# Patient Record
Sex: FEMALE | Race: WHITE | Employment: FULL TIME | URBAN - METROPOLITAN AREA
[De-identification: names, ages, dates, MRNs, and addresses within clinical notes are randomized per-mention and may not be internally consistent; named-entity substitution may affect disease eponyms.]

---

## 2017-03-21 ENCOUNTER — GENERIC CONVERSION - ENCOUNTER (OUTPATIENT)
Dept: OTHER | Facility: OTHER | Age: 47
End: 2017-03-21

## 2017-03-22 ENCOUNTER — GENERIC CONVERSION - ENCOUNTER (OUTPATIENT)
Dept: OTHER | Facility: OTHER | Age: 47
End: 2017-03-22

## 2017-03-23 ENCOUNTER — GENERIC CONVERSION - ENCOUNTER (OUTPATIENT)
Dept: OTHER | Facility: OTHER | Age: 47
End: 2017-03-23

## 2017-03-24 ENCOUNTER — GENERIC CONVERSION - ENCOUNTER (OUTPATIENT)
Dept: OTHER | Facility: OTHER | Age: 47
End: 2017-03-24

## 2017-05-04 ENCOUNTER — ALLSCRIPTS OFFICE VISIT (OUTPATIENT)
Dept: OTHER | Facility: OTHER | Age: 47
End: 2017-05-04

## 2017-05-04 DIAGNOSIS — N94.9 UNSPECIFIED CONDITION ASSOCIATED WITH FEMALE GENITAL ORGANS AND MENSTRUAL CYCLE: ICD-10-CM

## 2017-05-04 DIAGNOSIS — D25.9 LEIOMYOMA OF UTERUS: ICD-10-CM

## 2017-05-04 DIAGNOSIS — Z12.31 ENCOUNTER FOR SCREENING MAMMOGRAM FOR MALIGNANT NEOPLASM OF BREAST: ICD-10-CM

## 2017-05-04 DIAGNOSIS — Z01.419 ENCOUNTER FOR GYNECOLOGICAL EXAMINATION WITHOUT ABNORMAL FINDING: ICD-10-CM

## 2017-05-13 ENCOUNTER — HOSPITAL ENCOUNTER (OUTPATIENT)
Dept: ULTRASOUND IMAGING | Facility: HOSPITAL | Age: 47
Discharge: HOME | End: 2017-05-13
Attending: OBSTETRICS & GYNECOLOGY
Payer: COMMERCIAL

## 2017-05-13 DIAGNOSIS — N94.9 UNSPECIFIED CONDITION ASSOCIATED WITH FEMALE GENITAL ORGANS AND MENSTRUAL CYCLE: ICD-10-CM

## 2017-05-13 PROCEDURE — 76856 US EXAM PELVIC COMPLETE: CPT

## 2017-05-13 PROCEDURE — 76830 TRANSVAGINAL US NON-OB: CPT

## 2017-05-16 ENCOUNTER — GENERIC CONVERSION - ENCOUNTER (OUTPATIENT)
Dept: OTHER | Facility: OTHER | Age: 47
End: 2017-05-16

## 2017-05-22 ENCOUNTER — ALLSCRIPTS OFFICE VISIT (OUTPATIENT)
Dept: OTHER | Facility: OTHER | Age: 47
End: 2017-05-22

## 2017-06-03 ENCOUNTER — HOSPITAL ENCOUNTER (OUTPATIENT)
Dept: MAMMOGRAPHY | Facility: CLINIC | Age: 47
Discharge: HOME | End: 2017-06-03
Payer: COMMERCIAL

## 2017-06-03 DIAGNOSIS — Z12.31 ENCOUNTER FOR SCREENING MAMMOGRAM FOR MALIGNANT NEOPLASM OF BREAST: ICD-10-CM

## 2017-06-03 DIAGNOSIS — Z01.419 ENCOUNTER FOR GYNECOLOGICAL EXAMINATION WITHOUT ABNORMAL FINDING: ICD-10-CM

## 2017-06-03 PROCEDURE — G0202 SCR MAMMO BI INCL CAD: HCPCS

## 2017-10-07 ENCOUNTER — HOSPITAL ENCOUNTER (OUTPATIENT)
Dept: ULTRASOUND IMAGING | Facility: HOSPITAL | Age: 47
Discharge: HOME | End: 2017-10-07
Attending: OBSTETRICS & GYNECOLOGY
Payer: COMMERCIAL

## 2017-10-07 DIAGNOSIS — D25.9 LEIOMYOMA OF UTERUS: ICD-10-CM

## 2017-10-07 PROCEDURE — 76830 TRANSVAGINAL US NON-OB: CPT

## 2017-10-07 PROCEDURE — 76856 US EXAM PELVIC COMPLETE: CPT

## 2017-10-11 ENCOUNTER — GENERIC CONVERSION - ENCOUNTER (OUTPATIENT)
Dept: OTHER | Facility: OTHER | Age: 47
End: 2017-10-11

## 2017-11-11 ENCOUNTER — GENERIC CONVERSION - ENCOUNTER (OUTPATIENT)
Dept: OTHER | Facility: OTHER | Age: 47
End: 2017-11-11

## 2017-11-12 LAB
A/G RATIO (HISTORICAL): 1.6 (ref 1.2–2.2)
ALBUMIN SERPL BCP-MCNC: 4.5 G/DL (ref 3.5–5.5)
ALP SERPL-CCNC: 55 IU/L (ref 39–117)
ALT SERPL W P-5'-P-CCNC: 20 IU/L (ref 0–32)
AST SERPL W P-5'-P-CCNC: 16 IU/L (ref 0–40)
BILIRUB SERPL-MCNC: 0.3 MG/DL (ref 0–1.2)
BUN SERPL-MCNC: 11 MG/DL (ref 6–24)
BUN/CREA RATIO (HISTORICAL): 16 (ref 9–23)
CALCIUM SERPL-MCNC: 9.6 MG/DL (ref 8.7–10.2)
CHLORIDE SERPL-SCNC: 99 MMOL/L (ref 96–106)
CHOLEST SERPL-MCNC: 156 MG/DL (ref 100–199)
CHOLEST/HDLC SERPL: 3.2 RATIO UNITS (ref 0–4.4)
CO2 SERPL-SCNC: 23 MMOL/L (ref 18–29)
CREAT SERPL-MCNC: 0.67 MG/DL (ref 0.57–1)
DEPRECATED RDW RBC AUTO: 12.4 % (ref 12.3–15.4)
EGFR AFRICAN AMERICAN (HISTORICAL): 121 ML/MIN/1.73
EGFR-AMERICAN CALC (HISTORICAL): 105 ML/MIN/1.73
GLUCOSE SERPL-MCNC: 89 MG/DL (ref 65–99)
HCT VFR BLD AUTO: 40.8 % (ref 34–46.6)
HDLC SERPL-MCNC: 49 MG/DL
HGB BLD-MCNC: 14.1 G/DL (ref 11.1–15.9)
LDLC SERPL CALC-MCNC: 69 MG/DL (ref 0–99)
MCH RBC QN AUTO: 29.4 PG (ref 26.6–33)
MCHC RBC AUTO-ENTMCNC: 34.6 G/DL (ref 31.5–35.7)
MCV RBC AUTO: 85 FL (ref 79–97)
PLATELET # BLD AUTO: 403 X10E3/UL (ref 150–379)
POTASSIUM SERPL-SCNC: 4.1 MMOL/L (ref 3.5–5.2)
RBC (HISTORICAL): 4.8 X10E6/UL (ref 3.77–5.28)
SODIUM SERPL-SCNC: 138 MMOL/L (ref 134–144)
T4 FREE SERPL-MCNC: 1.06 NG/DL (ref 0.82–1.77)
TOT. GLOBULIN, SERUM (HISTORICAL): 2.8 G/DL (ref 1.5–4.5)
TOTAL PROTEIN (HISTORICAL): 7.3 G/DL (ref 6–8.5)
TRIGL SERPL-MCNC: 190 MG/DL (ref 0–149)
TSH SERPL DL<=0.05 MIU/L-ACNC: 4.11 UIU/ML (ref 0.45–4.5)
VLDLC SERPL CALC-MCNC: 38 MG/DL (ref 5–40)
WBC # BLD AUTO: 7.7 X10E3/UL (ref 3.4–10.8)

## 2017-11-20 ENCOUNTER — ALLSCRIPTS OFFICE VISIT (OUTPATIENT)
Dept: OTHER | Facility: OTHER | Age: 47
End: 2017-11-20

## 2017-11-21 NOTE — PROGRESS NOTES
Assessment    1  Elevated blood pressure reading in office without diagnosis of hypertension (796 2) (R03 0)   2  Polycystic ovarian syndrome (256 4) (E28 2)   3  Nontoxic single thyroid nodule (241 0) (E04 1)   4  Anxiety (300 00) (F41 9)   5  Obesity (278 00) (E66 9)    Discussion/Summary    BP excellent controlup and down- she will be more consistent with weight loss effortsreviewed in detail and copy given to patientexcellent profile with lower total cholesterolstaying stable, US to be done in 2018fibroid and L ovarian cyst following with Dr Nichole Constantino  The patient was counseled regarding diagnostic results,-- instructions for management,-- risk factor reductions,-- impressions  Possible side effects of new medications were reviewed with the patient/guardian today  The treatment plan was reviewed with the patient/guardian  The patient/guardian understands and agrees with the treatment plan     Self Referrals: No      Chief Complaint  Patient is here today for follow up of chronic conditions described in HPI  History of Present Illness  6 month follow up a recent sinus infection, resolving after 10 days  recent transvaginal US for fibroid and L ovarian cyst, rechecking in several months  Review of Systems   Constitutional: no fever,-- not feeling poorly,-- no recent weight gain,-- not feeling tired-- and-- no recent weight loss  Cardiovascular: no chest pain-- and-- no palpitations  Respiratory: no cough-- and-- no shortness of breath during exertion  Psychiatric: no anxiety-- and-- no depression  Hematologic/Lymphatic: no tendency for easy bruising  Active Problems  1  Adnexal mass (625 8) (N94 9)   2  Anxiety (300 00) (F41 9)   3  Constipation, unspecified constipation type (564 00) (K59 00)   4  Cyst of ovary, unspecified laterality (620 2) (N83 209)   5  Fibroid uterus (218 9) (D25 9)   6  Nontoxic single thyroid nodule (241 0) (E04 1)   7  Obesity (278 00) (E66 9)   8   Denied: History of Patient Education - Self-Examination Of Breasts   9  Polycystic ovarian syndrome (256 4) (E28 2)    Past Medical History  1  History of Anxiety disorder (300 00) (F41 9)   2  History of Back Strain (847 9)   3  History of  0 (V49 89)   4  History of Hirsutism (704 1) (L68 0)   5  History of polycystic ovarian syndrome (V13 29) (Z87 42)   6  Denied: History of substance abuse    The active problems and past medical history were reviewed and updated today  Surgical History  1  History of Denial Of Any Significant Medical History   2  History of Oral Surgery Tooth Extraction   3  Denied: History of Patient Education - Self-Examination Of Breasts    The surgical history was reviewed and updated today  Family History  Mother    1  Family history of Arthritis (V17 7)   2  Family history of depression (V17 0) (Z81 8)   3  Denied: Family history of substance abuse   4  Family history of Hypertension (V17 49)   5  Family history of Thyroid Disorder (V18 19)  Father    6  Family history of Crohn's Disease   7  Family history of Diabetes Mellitus (V18 0)   8  Denied: Family history of substance abuse   9  Family history of Heart Disease (V17 49)   10  Family history of Hypertension (V17 49)  Family History    11  Family history of Cancer    The family history was reviewed and updated today  Social History     · Denied: History of Alcohol Use (History)   · Birth Control Method - Oral Contraceptives   · Caffeine Use   · Denied: History of    · Marital History - Single   · Never A Smoker   · Non-smoker (V49 89) (Z78 9)   · Church Affiliation [employer]   · Uses Safety Equipment - Seatbelts  The social history was reviewed and updated today  The social history was reviewed and is unchanged  Current Meds   1  ALPRAZolam 0 5 MG Oral Tablet; 1/2 - 1 at bedtime; Therapy: 89IAA9458 to (Last WH:02BTI1087)  Requested for: 26CWJ6183 Ordered   2  Calcium + D3 600-200 MG-UNIT Oral Tablet Recorded   3  Multi-Vitamin TABS; Therapy: (Recorded:16Jan2014) to Recorded   4  Tri-Lo-Sprintec 0 18/0 215/0 25 MG-25 MCG Oral Tablet; TAKE 1 TABLET DAILY AS DIRECTED; Therapy: 78QOR6116 to (Last FZ:93MIL0302)  Requested for: 01EDA1404 Ordered   5  Vitamin B50 Complex TBCR; Therapy: (Recorded:16Jan2014) to Recorded    The medication list was reviewed and updated today  Allergies  1  Amoxicillin CAPS   2  Naproxen SUSP   3  Biaxin TABS   4  Influenza (Split PF)    Vitals  Vital Signs    Recorded: 20Nov2017 06:07PM   Temperature 99 F, Tympanic   Heart Rate 84   Systolic 286, Sitting   Diastolic 74, Sitting   BP CUFF SIZE Large   Height 5 ft 5 in   Weight 182 lb 6 4 oz   BMI Calculated 30 35   BSA Calculated 1 9       Physical Exam   Constitutional  General appearance: No acute distress, well appearing and well nourished  Cardiovascular  Auscultation of heart: Normal rate and rhythm, normal S1 and S2, without murmurs  Examination of extremities for edema and/or varicosities: Normal    Carotid pulses: Normal    Lymphatic  Palpation of lymph nodes in neck: No lymphadenopathy  Musculoskeletal  Gait and station: Normal    Psychiatric  Orientation to person, place, and time: Normal    Mood and affect: Normal          Results/Data  PHQ-2 Adult Depression Screening 20Nov2017 06:07PM User, Ahs     Test Name Result Flag Reference   PHQ-2 Adult Depression Score 1       Over the last two weeks, how often have you been bothered by any of the following problems?  Little interest or pleasure in doing things: Not at all - 0 Feeling down, depressed, or hopeless: Several days - 1   PHQ-2 Adult Depression Screening Negative           Signatures   Electronically signed by : Cesar Delacruz MD; Nov 20 2017  6:42PM EST                       (Author)

## 2018-01-09 NOTE — RESULT NOTES
Message   has upcoming f/u     Verified Results  [facility] TISSUE [id number] 09:47AM [place] [facility]     Test Name Result Flag Reference   US HEAD NECK SOFT TISSUE (Report)     THYROID ULTRASOUND     INDICATION: History of left thyroid nodule  Follow-up exam      COMPARISON: 10/26/2015     TECHNIQUE:  Ultrasound of the thyroid was performed with a high frequency linear transducer in transverse and sagittal planes including volumetric imaging sweeps as well as traditional still imaging technique  FINDINGS:   Normal homogeneous smooth echotexture  Right gland: 4 8 x 1 3 x 1 9 cm  No dominant nodules  Left gland: 5 0 x 1 4 x 1 6 cm  Posterior mid lobe hypoechoic ovoid nodule reidentified measuring 1 6 x 0 7 x 0 8 cm (previous measurement 1 5 x 0 7 x 0 8 cm)  Isthmus: 0 2 cm in AP dimension  No dominant nodules  IMPRESSION:      Stable ultrasound exam with a single nodule in the posterior mid lobe on the left as described  Workstation performed: [id number]     Signed by:    Orion Zimmer MD   12/5/16

## 2018-01-10 NOTE — MISCELLANEOUS
Message   Date: 21 Mar 2017 4:26 PM EST, Recorded By: Carla Liz For: Arlen Bruce   Caller: Nathaniel Fraser, Self   Phone: [phone] (Home), [phone] x,,,,, (Work)   Reason: Renew Medication   Patient called for BCP refill  Appointment 5/4/17  Escript sent  Active Problems    1  Acute otitis media (382 9) (H66 90)   2  Acute sinusitis (461 9) (J01 90)   3  Anxiety (300 00) (F41 9)   4  Birth control counseling (V25 09) (Z30 09)   5  Cerumen impaction (380 4) (H61 20)   6  Constipation, unspecified constipation type (564 00) (K59 00)   7  Encounter for screening for malignant neoplasm of cervix (V76 2) (Z12 4)   8  Encounter for screening mammogram for malignant neoplasm of breast (V76 12)   (Z12 31)   9  Eustachian tube dysfunction (381 81) (H69 80)   10  Flu vaccine need (V04 81) (Z23)   11  History of self breast exam   12  Hypertension (401 9) (I10)   13  Irritable Bowel Syndrome With Diarrhea (564 1)   14  Nontoxic single thyroid nodule (241 0) (E04 1)   15  Obesity (278 00) (E66 9)   16  Denied: History of Patient Education - Self-Examination Of Breasts   17  Polycystic ovarian syndrome (256 4) (E28 2)   18  Scalp lump (784 2) (R22 0)   19  Strep pharyngitis (034 0) (J02 0)   20  Tightness of neck (723 9) (R29 898)   21  Upper respiratory infection (465 9) (J06 9)   22  Visit for routine gyn exam (V72 31) (Z01 419)    Current Meds   1  ALPRAZolam 0 5 MG Oral Tablet; 1/2 - 1 at bedtime; Therapy: 75YBW3464 to (Last XJ:27IRY6679)  Requested for: 58ARW7879 Ordered   2  Calcium + D3 600-200 MG-UNIT Oral Tablet Recorded   3  Multi-Vitamin TABS; Therapy: (Recorded:16Jan2014) to Recorded   4  Tri-Lo-Sprintec 0 18/0 215/0 25 MG-25 MCG Oral Tablet; TAKE 1 TABLET DAILY AS   DIRECTED; Therapy: 20FYN6405 to (Evaluate:17Mar2016)  Requested for: 40KNO9782; Last   Rx:18Feb2016 Ordered   5  Vitamin B50 Complex TBCR; Therapy: (Recorded:16Jan2014) to Recorded    Allergies    1  Amoxicillin CAPS   2  Naproxen SUSP   3  Biaxin TABS   4   Influenza (Split PF)    Plan  Polycystic ovarian syndrome    · Tri-Lo-Sprintec 0 18/0 215/0 25 MG-25 MCG Oral Tablet; TAKE 1 TABLET DAILY  AS DIRECTED    Signatures   Electronically signed by : Wolf Gonzalez, ; Mar 21 2017  4:26PM EST                       (Author)

## 2018-01-10 NOTE — MISCELLANEOUS
Message   Recorded as Task   Date: 03/21/2017 05:33 PM, Created By: Kristie Greenwood   Task Name: Follow Up   Assigned To: Maisha Range   Regarding Patient: Lenore Jernigan, Status: Active   Comment:    Kristie Greenwood - 21 Mar 2017 5:33 PM     TASK CREATED  pt has had some elevated BPs, should come in before we renew her ocs  Diana Devlin - 22 Mar 2017 11:34 AM     TASK IN PROGRESS   Diana Devlin - 22 Mar 2017 2:35 PM     TASK EDITED  pt will come in for a BP check before we renew pills   Dinaa Devlin - 22 Mar 2017 2:35 PM     TASK COMPLETED   Diana Devlin - 23 Mar 2017 7:30 AM     TASK REACTIVATED   Diana Devlin - 23 Mar 2017 7:30 AM     TASK REASSIGNED: Previously Assigned To SCOTT NAVARRO KIARRA [facility],Team  pt was at [facility] this Am and her BP was 128/80  Janie Mort can we refill her pills? Kristie Greenwood - 24 Mar 2017 6:38 AM     TASK REPLIED TO: Previously Assigned To Kristie Greenwood  yes        Active Problems    1  Acute otitis media (382 9) (H66 90)   2  Acute sinusitis (461 9) (J01 90)   3  Anxiety (300 00) (F41 9)   4  Birth control counseling (V25 09) (Z30 09)   5  Cerumen impaction (380 4) (H61 20)   6  Constipation, unspecified constipation type (564 00) (K59 00)   7  Encounter for screening for malignant neoplasm of cervix (V76 2) (Z12 4)   8  Encounter for screening mammogram for malignant neoplasm of breast (V76 12)   (Z12 31)   9  Eustachian tube dysfunction (381 81) (H69 80)   10  Flu vaccine need (V04 81) (Z23)   11  History of self breast exam   12  Hypertension (401 9) (I10)   13  Irritable Bowel Syndrome With Diarrhea (564 1)   14  Nontoxic single thyroid nodule (241 0) (E04 1)   15  Obesity (278 00) (E66 9)   16  Denied: History of Patient Education - Self-Examination Of Breasts   17  Polycystic ovarian syndrome (256 4) (E28 2)   18  Scalp lump (784 2) (R22 0)   19  Strep pharyngitis (034 0) (J02 0)   20  Tightness of neck (723 9) (R29 898)   21  Upper respiratory infection (465 9) (J06 9)   22   Visit for routine gyn exam (V72 31) (Z01 419)    Current Meds   1  ALPRAZolam 0 5 MG Oral Tablet; 1/2 - 1 at bedtime; Therapy: 30VSF9815 to (Last XZ:82JPY7730)  Requested for: 35ARZ3207 Ordered   2  Calcium + D3 600-200 MG-UNIT Oral Tablet Recorded   3  Multi-Vitamin TABS; Therapy: (Recorded:16Jan2014) to Recorded   4  Tri-Lo-Sprintec 0 18/0 215/0 25 MG-25 MCG Oral Tablet; TAKE 1 TABLET DAILY; Therapy: 79XLV1290 to (Evaluate:54Cwn5141)  Requested for: 16NIM4649; Last   Rx:24Mar2017 Ordered   5  Vitamin B50 Complex TBCR; Therapy: (Recorded:16Jan2014) to Recorded    Allergies    1  Amoxicillin CAPS   2  Naproxen SUSP   3  Biaxin TABS   4   Influenza (Split PF)    Signatures   Electronically signed by : Jenifer Askew, ; Mar 24 2017  7:32AM EST                       (Author)

## 2018-01-11 DIAGNOSIS — N83.209 CYST OF OVARY: ICD-10-CM

## 2018-01-11 DIAGNOSIS — D25.9 LEIOMYOMA OF UTERUS: ICD-10-CM

## 2018-01-12 VITALS
WEIGHT: 181.13 LBS | HEIGHT: 65 IN | SYSTOLIC BLOOD PRESSURE: 112 MMHG | DIASTOLIC BLOOD PRESSURE: 78 MMHG | BODY MASS INDEX: 30.18 KG/M2

## 2018-01-12 NOTE — MISCELLANEOUS
Message   Recorded as Task   Date: 05/15/2017 04:05 PM, Created By: Latoya Kothari   Task Name: Go to Result   Assigned To: Mumtaz Castillo   Regarding Patient: Molly Riggins, Status: In Progress   Comment:    Latoya Kothari - 15 May 2017 4:05 PM     TASK CREATED  US shows multiple small to medium sized fibroids  normal ovaries, since this is a baseline study, would recommend repeat US in 4 months to be sure they are stable  please send her the fibroid pamphlet  Diana Devlin - 16 May 2017 1:48 PM     TASK IN PROGRESS   Patient informed  Order and pamphlet mailed  Active Problems    1  Acute otitis media (382 9) (H66 90)   2  Acute sinusitis (461 9) (J01 90)   3  Adnexal mass (625 8) (N94 9)   4  Anxiety (300 00) (F41 9)   5  Birth control counseling (V25 09) (Z30 09)   6  Cerumen impaction (380 4) (H61 20)   7  Constipation, unspecified constipation type (564 00) (K59 00)   8  Encounter for screening for malignant neoplasm of cervix (V76 2) (Z12 4)   9  Encounter for screening mammogram for malignant neoplasm of breast (V76 12)   (Z12 31)   10  Eustachian tube dysfunction (381 81) (H69 80)   11  Fibroid uterus (218 9) (D25 9)   12  Flu vaccine need (V04 81) (Z23)   13  History of self breast exam   14  Hypertension (401 9) (I10)   15  Irritable Bowel Syndrome With Diarrhea (564 1)   16  Nontoxic single thyroid nodule (241 0) (E04 1)   17  Obesity (278 00) (E66 9)   18  Denied: History of Patient Education - Self-Examination Of Breasts   19  Polycystic ovarian syndrome (256 4) (E28 2)   20  Scalp lump (784 2) (R22 0)   21  Strep pharyngitis (034 0) (J02 0)   22  Tightness of neck (723 9) (R29 898)   23  Upper respiratory infection (465 9) (J06 9)   24  Visit for routine gyn exam (V72 31) (Z01 419)    Current Meds   1  ALPRAZolam 0 5 MG Oral Tablet; 1/2 - 1 at bedtime; Therapy: 80OJW8496 to (Last OB:97FFA9954)  Requested for: 55IHP4013 Ordered   2   Calcium + D3 600-200 MG-UNIT Oral Tablet Recorded   3  Multi-Vitamin TABS; Therapy: (Recorded:16Jan2014) to Recorded   4  Tri-Lo-Sprintec 0 18/0 215/0 25 MG-25 MCG Oral Tablet; TAKE 1 TABLET DAILY AS   DIRECTED; Therapy: 12JBL5964 to (Last MR:91NAR5904)  Requested for: 52XXR6027 Ordered   5  Vitamin B50 Complex TBCR; Therapy: (Recorded:16Jan2014) to Recorded    Allergies    1  Amoxicillin CAPS   2  Naproxen SUSP   3  Biaxin TABS   4  Influenza (Split PF)    Plan  Fibroid uterus    · US PELVIS COMPLETE NON OB; Status:Hold For - Scheduling,Retrospective  Authorization;  Requested for:92Sxp0755;     Signatures   Electronically signed by : Giancarlo Uribe, ; May 16 2017  4:32PM EST                       (Author)

## 2018-01-13 VITALS — DIASTOLIC BLOOD PRESSURE: 80 MMHG | SYSTOLIC BLOOD PRESSURE: 128 MMHG

## 2018-01-13 NOTE — MISCELLANEOUS
Message   Recorded as Task   Date: 03/21/2017 05:33 PM, Created By: Sonya Cordoba   Task Name: Follow Up   Assigned To: Maral Wilson   Regarding Patient: Lyudmila Brasher, Status: In Progress   Comment:    Sonya Cordoba - 21 Mar 2017 5:33 PM     TASK CREATED  pt has had some elevated BPs, should come in before we renew her ocs  Diana Devlin - 22 Mar 2017 11:34 AM     TASK IN PROGRESS   Diana Devlin - 22 Mar 2017 2:35 PM     TASK EDITED  pt will come in for a BP check before we renew pills        Active Problems    1  Acute otitis media (382 9) (H66 90)   2  Acute sinusitis (461 9) (J01 90)   3  Anxiety (300 00) (F41 9)   4  Birth control counseling (V25 09) (Z30 09)   5  Cerumen impaction (380 4) (H61 20)   6  Constipation, unspecified constipation type (564 00) (K59 00)   7  Encounter for screening for malignant neoplasm of cervix (V76 2) (Z12 4)   8  Encounter for screening mammogram for malignant neoplasm of breast (V76 12)   (Z12 31)   9  Eustachian tube dysfunction (381 81) (H69 80)   10  Flu vaccine need (V04 81) (Z23)   11  History of self breast exam   12  Hypertension (401 9) (I10)   13  Irritable Bowel Syndrome With Diarrhea (564 1)   14  Nontoxic single thyroid nodule (241 0) (E04 1)   15  Obesity (278 00) (E66 9)   16  Denied: History of Patient Education - Self-Examination Of Breasts   17  Polycystic ovarian syndrome (256 4) (E28 2)   18  Scalp lump (784 2) (R22 0)   19  Strep pharyngitis (034 0) (J02 0)   20  Tightness of neck (723 9) (R29 898)   21  Upper respiratory infection (465 9) (J06 9)   22  Visit for routine gyn exam (V72 31) (Z01 419)    Current Meds   1  ALPRAZolam 0 5 MG Oral Tablet; 1/2 - 1 at bedtime; Therapy: 31TVF2749 to (Last XO:67LTB8607)  Requested for: 70RXB0676 Ordered   2  Calcium + D3 600-200 MG-UNIT Oral Tablet Recorded   3  Multi-Vitamin TABS; Therapy: (Recorded:16Jan2014) to Recorded   4   Tri-Lo-Sprintec 0 18/0 215/0 25 MG-25 MCG Oral Tablet; TAKE 1 TABLET DAILY AS   DIRECTED; Therapy: 86BXW8216 to (Evaluate:17Mar2016)  Requested for: 21Mar2017; Last   Rx:18Feb2016; Status: ACTIVE - Renewal Voided Ordered   5  Vitamin B50 Complex TBCR; Therapy: (Recorded:16Jan2014) to Recorded    Allergies    1  Amoxicillin CAPS   2  Naproxen SUSP   3  Biaxin TABS   4   Influenza (Split PF)    Signatures   Electronically signed by : Delvin Camargo, ; Mar 22 2017  2:35PM EST                       (Author)

## 2018-01-14 VITALS
SYSTOLIC BLOOD PRESSURE: 134 MMHG | HEIGHT: 65 IN | TEMPERATURE: 99.3 F | DIASTOLIC BLOOD PRESSURE: 86 MMHG | WEIGHT: 180.4 LBS | HEART RATE: 76 BPM | BODY MASS INDEX: 30.06 KG/M2

## 2018-01-14 VITALS
TEMPERATURE: 99 F | HEART RATE: 84 BPM | HEIGHT: 65 IN | DIASTOLIC BLOOD PRESSURE: 74 MMHG | BODY MASS INDEX: 30.39 KG/M2 | WEIGHT: 182.4 LBS | SYSTOLIC BLOOD PRESSURE: 128 MMHG

## 2018-01-14 NOTE — MISCELLANEOUS
Message   Recorded as Task   Date: 10/10/2017 10:21 PM, Created By: Melisa Sawyer   Task Name: Go to Result   Assigned To: Efren Escoto   Regarding Patient: Evelyn Chavarria, Status: In Progress   Comment:    Melisa Sawyer - 10 Oct 2017 10:21 PM     TASK CREATED  Us shows two stable fibroids and one larger than last US, she also has a left ovarian cyst, repeat US in 3 months   Diana Devlin - 11 Oct 2017 10:08 AM     TASK IN PROGRESS   Diana Devlin - 11 Oct 2017 1:01 PM     TASK EDITED  U/S script mailed to pt           Active Problems    1  Adnexal mass (625 8) (N94 9)   2  Anxiety (300 00) (F41 9)   3  Constipation, unspecified constipation type (564 00) (K59 00)   4  Cyst of ovary, unspecified laterality (620 2) (N83 209)   5  Fibroid uterus (218 9) (D25 9)   6  Hypertension (401 9) (I10)   7  Nontoxic single thyroid nodule (241 0) (E04 1)   8  Obesity (278 00) (E66 9)   9  Denied: History of Patient Education - Self-Examination Of Breasts   10  Polycystic ovarian syndrome (256 4) (E28 2)    Current Meds   1  ALPRAZolam 0 5 MG Oral Tablet; 1/2 - 1 at bedtime; Therapy: 18KTV5200 to (Last RC:50AFO5756)  Requested for: 09BZI0470 Ordered   2  Calcium + D3 600-200 MG-UNIT Oral Tablet Recorded   3  Multi-Vitamin TABS; Therapy: (Recorded:16Jan2014) to Recorded   4  Tri-Lo-Sprintec 0 18/0 215/0 25 MG-25 MCG Oral Tablet; TAKE 1 TABLET DAILY AS   DIRECTED; Therapy: 18MHK4069 to (Last EI:61YGB1852)  Requested for: 49LIE1743 Ordered   5  Vitamin B50 Complex TBCR; Therapy: (Recorded:16Jan2014) to Recorded    Allergies    1  Amoxicillin CAPS   2  Naproxen SUSP   3  Biaxin TABS   4  Influenza (Split PF)    Plan  Cyst of ovary, unspecified laterality, Fibroid uterus    · * US PELVIS COMPLETE (TRANSABDOMINAL AND TRANSVAGINAL); Status:Hold For -  [place];  Requested FUY:76ZBN7174;     Signatures   Electronically signed by : Tracy Granado, ; Oct 11 2017  1:02PM EST (Author)

## 2018-01-17 NOTE — MISCELLANEOUS
Message   Recorded as Task   Date: 02/22/2016 01:41 PM, Created By: [id number] [email or web address]   Task Name: Follow Up   Assigned To: Maisha Reaves   Regarding Patient: Lenore Jernigan, Status: In Progress   Comment:    Diana Devlin - 22 Feb 2016 1:41 PM     TASK CREATED  pt picked up her generic ortho lo and saw on the instructions that you shouldn't take it if you are over 35    Isn't that on all of the pill instructions? Anmolselvinglenn  - 22 Feb 2016 3:53 PM     TASK REPLIED TO: Previously Assigned To Kristie Greenwood  the main concern is if she is a smoker over 28  The risks do go up with age but she is a good candidate because she is otherwise healthy  This goes for any combination pill  Diana Devlin - 22 Feb 2016 3:55 PM     TASK IN PROGRESS   Diana Devlin - 23 Feb 2016 9:33 AM     TASK REASSIGNED: Previously Assigned To Diana Devlin        Active Problems    1  Acute otitis media (382 9) (H66 90)   2  Acute sinusitis (461 9) (J01 90)   3  Anxiety (300 00) (F41 9)   4  Birth control counseling (V25 09) (Z30 9)   5  Cerumen impaction (380 4) (H61 20)   6  Encounter for screening for malignant neoplasm of cervix (V76 2) (Z12 4)   7  Encounter for screening mammogram for malignant neoplasm of breast (V76 12)   (Z12 31)   8  Eustachian tube dysfunction (381 81) (H69 80)   9  Flu vaccine need (V04 81) (Z23)   10  History of self breast exam   11  Hypertension (401 9) (I10)   12  Irritable Bowel Syndrome With Diarrhea (564 1)   13  Nontoxic single thyroid nodule (241 0) (E04 1)   14  Denied: History of Patient Education - Self-Examination Of Breasts   15  Polycystic ovarian syndrome (256 4) (E28 2)   16  Scalp lump (784 2) (R22 0)   17  Strep pharyngitis (034 0) (J02 0)   18  Tightness of neck (723 9) (R29 898)   19  Upper respiratory infection (465 9) (J06 9)   20  Visit for routine gyn exam (V72 31) (Z01 419)    Current Meds   1  ALPRAZolam 0 5 MG Oral Tablet; 1/2 - 1 at bedtime;    Therapy: 37VRM9882 to (Last KV:97RNF1078)  Requested for: 04JOQ2668 Ordered   2  Calcium + D3 600-200 MG-UNIT Oral Tablet Recorded   3  Microgestin FE 1/20 1-20 MG-MCG Oral Tablet; TAKE 1 TABLET DAILY AS DIRECTED; Therapy: 39CVQ9437 to (Evaluate:19Feb2015); Last Rx:33Nsy6407 Ordered   4  Multi-Vitamin TABS; Therapy: (Recorded:16Jan2014) to Recorded   5  Tri-Lo-Sprintec 0 18/0 215/0 25 MG-25 MCG Oral Tablet; TAKE 1 TABLET DAILY AS   DIRECTED; Therapy: 24XEA1222 to (Evaluate:17Mar2016)  Requested for: 35IKA7993; Last   Rx:18Feb2016 Ordered   6  Vitamin B50 Complex TBCR; Therapy: (Recorded:16Jan2014) to Recorded    Allergies    1  Amoxicillin CAPS   2  Naproxen SUSP   3  Biaxin TABS   4   Influenza (Split PF)    Signatures   Electronically signed by : David Vance, ; Feb 24 2016  2:35PM EST                       (Author)

## 2018-01-27 ENCOUNTER — HOSPITAL ENCOUNTER (OUTPATIENT)
Dept: ULTRASOUND IMAGING | Facility: HOSPITAL | Age: 48
Discharge: HOME | End: 2018-01-27
Attending: OBSTETRICS & GYNECOLOGY
Payer: COMMERCIAL

## 2018-01-27 DIAGNOSIS — D25.9 LEIOMYOMA OF UTERUS: ICD-10-CM

## 2018-01-27 DIAGNOSIS — N83.209 CYST OF OVARY: ICD-10-CM

## 2018-01-27 PROCEDURE — 76856 US EXAM PELVIC COMPLETE: CPT

## 2018-01-27 PROCEDURE — 76830 TRANSVAGINAL US NON-OB: CPT

## 2018-05-15 DIAGNOSIS — Z30.41 ENCOUNTER FOR SURVEILLANCE OF CONTRACEPTIVE PILLS: Primary | ICD-10-CM

## 2018-05-15 RX ORDER — NORGESTIMATE AND ETHINYL ESTRADIOL 7DAYSX3 LO
1 KIT ORAL DAILY
Qty: 28 TABLET | Refills: 1 | Status: SHIPPED | OUTPATIENT
Start: 2018-05-15 | End: 2018-06-21 | Stop reason: SDUPTHER

## 2018-05-20 PROBLEM — N94.89 ADNEXAL MASS: Status: ACTIVE | Noted: 2017-05-04

## 2018-05-20 PROBLEM — D25.9 FIBROID UTERUS: Status: ACTIVE | Noted: 2017-05-16

## 2018-05-20 PROBLEM — R03.0 ELEVATED BLOOD PRESSURE READING IN OFFICE WITHOUT DIAGNOSIS OF HYPERTENSION: Status: ACTIVE | Noted: 2017-11-20

## 2018-05-20 PROBLEM — N83.209 CYST OF OVARY: Status: ACTIVE | Noted: 2017-10-11

## 2018-05-20 RX ORDER — ALPRAZOLAM 0.5 MG/1
TABLET ORAL
COMMUNITY
Start: 2011-09-14 | End: 2018-12-17 | Stop reason: ALTCHOICE

## 2018-05-20 RX ORDER — NORGESTIMATE AND ETHINYL ESTRADIOL 7DAYSX3 LO
1 KIT ORAL DAILY
COMMUNITY
Start: 2016-02-18 | End: 2018-05-21

## 2018-05-20 RX ORDER — MULTIVITAMIN
TABLET ORAL
COMMUNITY

## 2018-05-21 ENCOUNTER — OFFICE VISIT (OUTPATIENT)
Dept: FAMILY MEDICINE CLINIC | Facility: HOSPITAL | Age: 48
End: 2018-05-21
Payer: COMMERCIAL

## 2018-05-21 VITALS
TEMPERATURE: 98.2 F | HEART RATE: 85 BPM | HEIGHT: 65 IN | DIASTOLIC BLOOD PRESSURE: 78 MMHG | BODY MASS INDEX: 30.66 KG/M2 | SYSTOLIC BLOOD PRESSURE: 132 MMHG | WEIGHT: 184 LBS

## 2018-05-21 DIAGNOSIS — M67.449 MUCOUS CYST OF FINGER: ICD-10-CM

## 2018-05-21 DIAGNOSIS — E04.1 NONTOXIC SINGLE THYROID NODULE: ICD-10-CM

## 2018-05-21 DIAGNOSIS — E66.09 CLASS 1 OBESITY DUE TO EXCESS CALORIES WITHOUT SERIOUS COMORBIDITY WITH BODY MASS INDEX (BMI) OF 30.0 TO 30.9 IN ADULT: ICD-10-CM

## 2018-05-21 DIAGNOSIS — D25.9 UTERINE LEIOMYOMA, UNSPECIFIED LOCATION: ICD-10-CM

## 2018-05-21 DIAGNOSIS — R03.0 ELEVATED BLOOD PRESSURE READING IN OFFICE WITHOUT DIAGNOSIS OF HYPERTENSION: Primary | ICD-10-CM

## 2018-05-21 PROCEDURE — 3008F BODY MASS INDEX DOCD: CPT | Performed by: FAMILY MEDICINE

## 2018-05-21 PROCEDURE — 99214 OFFICE O/P EST MOD 30 MIN: CPT | Performed by: FAMILY MEDICINE

## 2018-05-21 NOTE — PROGRESS NOTES
Assessment/Plan:         Diagnoses and all orders for this visit:    Elevated blood pressure reading in office without diagnosis of hypertension  Comments:  Very good control without medication  Orders:  -     CBC and differential; Future  -     Comprehensive metabolic panel; Future  -     Lipid Panel with Direct LDL reflex; Future  -     TSH, 3rd generation; Future  -     CBC and differential  -     Comprehensive metabolic panel  -     Lipid Panel with Direct LDL reflex  -     TSH, 3rd generation    Uterine leiomyoma, unspecified location  Comments: Followup with Gyn    Mucous cyst of finger    Class 1 obesity due to excess calories without serious comorbidity with body mass index (BMI) of 30 0 to 30 9 in adult    Nontoxic single thyroid nodule          Subjective:      Patient ID: Barney Enriquez is a 52 y o  female  Feeling well overall  No recent illness or injury  Her walking was interfered with by the weather  Frustrated with her weight gain and increase in hip size  The following portions of the patient's history were reviewed and updated as appropriate: allergies, current medications, past family history, past medical history, past social history, past surgical history and problem list     Review of Systems   Constitutional: Positive for unexpected weight change  HENT: Negative for ear pain  Respiratory: Negative  Cardiovascular: Negative  Gastrointestinal: Negative for constipation  Genitourinary: Positive for pelvic pain and vaginal bleeding  Musculoskeletal: Negative  Allergic/Immunologic: Positive for environmental allergies  Neurological: Negative for speech difficulty and headaches  Hematological: Negative  Psychiatric/Behavioral: Negative for behavioral problems and dysphoric mood           Objective:      /78   Pulse 85   Temp 98 2 °F (36 8 °C)   Ht 5' 5" (1 651 m)   Wt 83 5 kg (184 lb)   BMI 30 62 kg/m²          Physical Exam   Constitutional: She is oriented to person, place, and time  She appears well-developed and well-nourished  Neck: No thyromegaly present  Pulmonary/Chest: Breath sounds normal    Neurological: She is oriented to person, place, and time  Psychiatric: She has a normal mood and affect  Her behavior is normal  Judgment and thought content normal    Nursing note and vitals reviewed

## 2018-06-21 ENCOUNTER — ANNUAL EXAM (OUTPATIENT)
Dept: OBGYN CLINIC | Facility: CLINIC | Age: 48
End: 2018-06-21
Payer: COMMERCIAL

## 2018-06-21 VITALS
SYSTOLIC BLOOD PRESSURE: 120 MMHG | HEIGHT: 64 IN | BODY MASS INDEX: 31.28 KG/M2 | WEIGHT: 183.2 LBS | DIASTOLIC BLOOD PRESSURE: 80 MMHG

## 2018-06-21 DIAGNOSIS — Z12.4 CERVICAL CANCER SCREENING: ICD-10-CM

## 2018-06-21 DIAGNOSIS — Z30.41 ENCOUNTER FOR SURVEILLANCE OF CONTRACEPTIVE PILLS: ICD-10-CM

## 2018-06-21 DIAGNOSIS — Z12.31 ENCOUNTER FOR SCREENING MAMMOGRAM FOR BREAST CANCER: ICD-10-CM

## 2018-06-21 DIAGNOSIS — Z01.419 ENCOUNTER FOR ANNUAL ROUTINE GYNECOLOGICAL EXAMINATION: Primary | ICD-10-CM

## 2018-06-21 DIAGNOSIS — Z11.51 SCREENING FOR HPV (HUMAN PAPILLOMAVIRUS): ICD-10-CM

## 2018-06-21 DIAGNOSIS — D25.2 INTRAMURAL AND SUBSEROUS LEIOMYOMA OF UTERUS: ICD-10-CM

## 2018-06-21 DIAGNOSIS — D25.1 INTRAMURAL AND SUBSEROUS LEIOMYOMA OF UTERUS: ICD-10-CM

## 2018-06-21 PROCEDURE — G0145 SCR C/V CYTO,THINLAYER,RESCR: HCPCS | Performed by: OBSTETRICS & GYNECOLOGY

## 2018-06-21 PROCEDURE — 87624 HPV HI-RISK TYP POOLED RSLT: CPT | Performed by: OBSTETRICS & GYNECOLOGY

## 2018-06-21 PROCEDURE — S0612 ANNUAL GYNECOLOGICAL EXAMINA: HCPCS | Performed by: OBSTETRICS & GYNECOLOGY

## 2018-06-21 RX ORDER — NORGESTIMATE AND ETHINYL ESTRADIOL 7DAYSX3 LO
1 KIT ORAL DAILY
Qty: 28 TABLET | Refills: 13 | Status: SHIPPED | OUTPATIENT
Start: 2018-06-21 | End: 2019-01-10 | Stop reason: ALTCHOICE

## 2018-06-21 NOTE — PROGRESS NOTES
Assessment/Plan:    Fibroids-they seem stable on exam, will recheck in January  I did explain that if they seem to be getting larger, I would recommend stopping the OCs  Pap and HPV done    Polycystic ovarian syndrome-Discussed ocs, right now she is a good candidate but she is aware that if her blood pressure goes up or she is having an issue with her fibroids we will need to stop this  She is seeing her family doctor soon as he has been following her for general preventive healthcare  Mammogram ordered and she will schedule this    No problem-specific Assessment & Plan notes found for this encounter  Diagnoses and all orders for this visit:    Encounter for annual routine gynecological examination    Encounter for screening mammogram for breast cancer  -     Mammo screening bilateral w cad; Future    Cervical cancer screening  -     Liquid-based pap, screening    Screening for HPV (human papillomavirus)  -     Liquid-based pap, screening    Intramural and subserous leiomyoma of uterus  -     US pelvis complete w transvaginal; Future    Encounter for surveillance of contraceptive pills  -     norgestimate-ethinyl estradiol (ORTHO TRI-CYCLEN LO) 0 18/0 215/0 25 MG-25 MCG per tablet; Take 1 tablet by mouth daily for 56 days          Subjective:      Patient ID: Danelle Ace is a 52 y o  female  Pt here for yearly, due for pap  Last year, I felt a fullness in the left adnexa and ultrasound was done  She had multiple fibroids  She has had 2 follow-up ultrasounds and they seem to be stable although there difficult to measure due to the amount of them  The most recent ultrasound was in January  She is asymptomatic  She continues on generic Ortho Tri-Cyclen Lo for history of irregular cycles due to polycystic ovarian syndrome  She has been doing well with this and her blood pressure seems to be normal   Over a year ago, she had 2 elevated blood pressures but this has not recurred    She prefers to stay on the pills if possible  She is a nonsmoker and has been trying to focus on diet and exercise  She is due for a Pap and a mammogram         The following portions of the patient's history were reviewed and updated as appropriate: allergies, current medications, past family history, past medical history, past social history, past surgical history and problem list     Review of Systems   Constitutional: Negative  HENT: Negative  Eyes: Negative  Respiratory: Negative  Cardiovascular: Negative  Gastrointestinal: Negative  Endocrine: Negative  Genitourinary: Negative  Musculoskeletal: Negative  Skin: Negative  Allergic/Immunologic: Negative  Neurological: Negative  Hematological: Negative  Psychiatric/Behavioral: Negative            Objective:      /80 (BP Location: Left arm, Patient Position: Sitting, Cuff Size: Large)   Ht 5' 4" (1 626 m)   Wt 83 1 kg (183 lb 3 2 oz)   LMP 06/14/2018 (Exact Date)   BMI 31 45 kg/m²          Physical Exam

## 2018-06-26 ENCOUNTER — TELEPHONE (OUTPATIENT)
Dept: OBGYN CLINIC | Facility: CLINIC | Age: 48
End: 2018-06-26

## 2018-06-26 LAB — HPV RRNA GENITAL QL NAA+PROBE: NORMAL

## 2018-06-26 NOTE — TELEPHONE ENCOUNTER
LM for patient reminding her it is time to schedule her mammogram with central scheduling phone number

## 2018-06-27 DIAGNOSIS — Z30.41 ENCOUNTER FOR SURVEILLANCE OF CONTRACEPTIVE PILLS: ICD-10-CM

## 2018-06-28 LAB
LAB AP GYN PRIMARY INTERPRETATION: NORMAL
Lab: NORMAL

## 2018-07-05 ENCOUNTER — TELEPHONE (OUTPATIENT)
Dept: OBGYN CLINIC | Facility: CLINIC | Age: 48
End: 2018-07-05

## 2018-08-25 ENCOUNTER — HOSPITAL ENCOUNTER (OUTPATIENT)
Dept: MAMMOGRAPHY | Facility: CLINIC | Age: 48
Discharge: HOME | End: 2018-08-25
Payer: COMMERCIAL

## 2018-08-25 DIAGNOSIS — Z12.31 ENCOUNTER FOR SCREENING MAMMOGRAM FOR BREAST CANCER: ICD-10-CM

## 2018-08-25 PROCEDURE — 77067 SCR MAMMO BI INCL CAD: CPT

## 2018-10-15 RX ORDER — NORGESTIMATE AND ETHINYL ESTRADIOL
KIT
Qty: 28 TABLET | Refills: 1 | OUTPATIENT
Start: 2018-10-15

## 2018-11-11 LAB
ALBUMIN SERPL-MCNC: 4.5 G/DL (ref 3.5–5.5)
ALBUMIN/GLOB SERPL: 1.8 {RATIO} (ref 1.2–2.2)
ALP SERPL-CCNC: 52 IU/L (ref 39–117)
ALT SERPL-CCNC: 17 IU/L (ref 0–32)
AST SERPL-CCNC: 14 IU/L (ref 0–40)
BASOPHILS # BLD AUTO: 0 X10E3/UL (ref 0–0.2)
BASOPHILS NFR BLD AUTO: 0 %
BILIRUB SERPL-MCNC: 0.3 MG/DL (ref 0–1.2)
BUN SERPL-MCNC: 10 MG/DL (ref 6–24)
BUN/CREAT SERPL: 14 (ref 9–23)
CALCIUM SERPL-MCNC: 9.3 MG/DL (ref 8.7–10.2)
CHLORIDE SERPL-SCNC: 100 MMOL/L (ref 96–106)
CHOLEST SERPL-MCNC: 147 MG/DL (ref 100–199)
CO2 SERPL-SCNC: 24 MMOL/L (ref 20–29)
CREAT SERPL-MCNC: 0.7 MG/DL (ref 0.57–1)
EOSINOPHIL # BLD AUTO: 0 X10E3/UL (ref 0–0.4)
EOSINOPHIL NFR BLD AUTO: 1 %
ERYTHROCYTE [DISTWIDTH] IN BLOOD BY AUTOMATED COUNT: 12.5 % (ref 12.3–15.4)
GLOBULIN SER-MCNC: 2.5 G/DL (ref 1.5–4.5)
GLUCOSE SERPL-MCNC: 93 MG/DL (ref 65–99)
HCT VFR BLD AUTO: 40.6 % (ref 34–46.6)
HDLC SERPL-MCNC: 47 MG/DL
HGB BLD-MCNC: 13.3 G/DL (ref 11.1–15.9)
IMM GRANULOCYTES # BLD: 0 X10E3/UL (ref 0–0.1)
IMM GRANULOCYTES NFR BLD: 0 %
LDLC SERPL CALC-MCNC: 68 MG/DL (ref 0–99)
LDLC/HDLC SERPL: 1.4 RATIO (ref 0–3.2)
LYMPHOCYTES # BLD AUTO: 2.4 X10E3/UL (ref 0.7–3.1)
LYMPHOCYTES NFR BLD AUTO: 41 %
MCH RBC QN AUTO: 29.2 PG (ref 26.6–33)
MCHC RBC AUTO-ENTMCNC: 32.8 G/DL (ref 31.5–35.7)
MCV RBC AUTO: 89 FL (ref 79–97)
MONOCYTES # BLD AUTO: 0.3 X10E3/UL (ref 0.1–0.9)
MONOCYTES NFR BLD AUTO: 6 %
NEUTROPHILS # BLD AUTO: 3.1 X10E3/UL (ref 1.4–7)
NEUTROPHILS NFR BLD AUTO: 52 %
PLATELET # BLD AUTO: 399 X10E3/UL (ref 150–379)
POTASSIUM SERPL-SCNC: 4.1 MMOL/L (ref 3.5–5.2)
PROT SERPL-MCNC: 7 G/DL (ref 6–8.5)
RBC # BLD AUTO: 4.55 X10E6/UL (ref 3.77–5.28)
SL AMB EGFR AFRICAN AMERICAN: 118 ML/MIN/1.73
SL AMB EGFR NON AFRICAN AMERICAN: 103 ML/MIN/1.73
SL AMB VLDL CHOLESTEROL CALC: 32 MG/DL (ref 5–40)
SODIUM SERPL-SCNC: 138 MMOL/L (ref 134–144)
TRIGL SERPL-MCNC: 158 MG/DL (ref 0–149)
TSH SERPL DL<=0.005 MIU/L-ACNC: 3.42 UIU/ML (ref 0.45–4.5)
WBC # BLD AUTO: 5.9 X10E3/UL (ref 3.4–10.8)

## 2018-11-13 ENCOUNTER — TRANSCRIBE ORDERS (OUTPATIENT)
Dept: ADMINISTRATIVE | Facility: HOSPITAL | Age: 48
End: 2018-11-13

## 2018-11-13 DIAGNOSIS — E04.1 THYROID NODULE: Primary | ICD-10-CM

## 2018-11-20 DIAGNOSIS — N80.0 ADENOMYOSIS: Primary | ICD-10-CM

## 2018-11-21 ENCOUNTER — OFFICE VISIT (OUTPATIENT)
Dept: FAMILY MEDICINE CLINIC | Facility: HOSPITAL | Age: 48
End: 2018-11-21
Payer: COMMERCIAL

## 2018-11-21 VITALS
DIASTOLIC BLOOD PRESSURE: 94 MMHG | SYSTOLIC BLOOD PRESSURE: 146 MMHG | BODY MASS INDEX: 30.09 KG/M2 | HEART RATE: 87 BPM | TEMPERATURE: 98.3 F | WEIGHT: 180.6 LBS | OXYGEN SATURATION: 98 % | HEIGHT: 65 IN

## 2018-11-21 DIAGNOSIS — E04.1 NONTOXIC SINGLE THYROID NODULE: ICD-10-CM

## 2018-11-21 DIAGNOSIS — R03.0 ELEVATED BLOOD PRESSURE READING IN OFFICE WITHOUT DIAGNOSIS OF HYPERTENSION: Primary | ICD-10-CM

## 2018-11-21 DIAGNOSIS — E28.2 POLYCYSTIC OVARIAN SYNDROME: ICD-10-CM

## 2018-11-21 DIAGNOSIS — E66.09 CLASS 1 OBESITY DUE TO EXCESS CALORIES WITH SERIOUS COMORBIDITY AND BODY MASS INDEX (BMI) OF 30.0 TO 30.9 IN ADULT: ICD-10-CM

## 2018-11-21 DIAGNOSIS — L30.8 OTHER ECZEMA: ICD-10-CM

## 2018-11-21 PROBLEM — L30.9 ECZEMA: Status: ACTIVE | Noted: 2018-11-21

## 2018-11-21 PROCEDURE — 3008F BODY MASS INDEX DOCD: CPT | Performed by: FAMILY MEDICINE

## 2018-11-21 PROCEDURE — 99214 OFFICE O/P EST MOD 30 MIN: CPT | Performed by: FAMILY MEDICINE

## 2018-11-21 PROCEDURE — 1036F TOBACCO NON-USER: CPT | Performed by: FAMILY MEDICINE

## 2018-11-21 RX ORDER — BETAMETHASONE DIPROPIONATE 0.5 MG/G
LOTION TOPICAL 2 TIMES DAILY
Qty: 60 ML | Refills: 1 | Status: SHIPPED | OUTPATIENT
Start: 2018-11-21 | End: 2019-05-22

## 2018-11-21 NOTE — PROGRESS NOTES
Assessment/Plan:         Diagnoses and all orders for this visit:    Elevated blood pressure reading in office without diagnosis of hypertension  Comments:  Urged to get own BP cuff even as a wrist monitor to check readings at various times of day in and out of work  Report any persistent numbers in 150 or 90 range    Class 1 obesity due to excess calories with serious comorbidity and body mass index (BMI) of 30 0 to 30 9 in adult  Comments:  Continue weight control efforts    Other eczema  -     betamethasone dipropionate 0 05 % lotion; Apply topically 2 (two) times a day    Nontoxic single thyroid nodule  Comments: Follow with Endocrinology    Polycystic ovarian syndrome  Comments:  OK to continue OC with close eye on BP's  Subjective:      Patient ID: Angelia Nichols is a 50 y o  female  6 month follow up  No recent illness or injury  Stresses over her mother who recently had a stroke but is doing quite well  Work can be stressful    Following up with gyn visits, had normal PAP and mammogram this year  Working with OC's for PCOS and might be causing BP elevations    Has dry skin patches on buttocks and hands, using Curel but is itchy  The following portions of the patient's history were reviewed and updated as appropriate: allergies, current medications, past family history, past medical history, past social history, past surgical history and problem list     Review of Systems   Constitutional: Negative for activity change, appetite change, diaphoresis, fatigue and unexpected weight change  HENT: Negative for congestion and sore throat  Eyes: Negative for visual disturbance  Respiratory: Positive for chest tightness  Negative for cough and wheezing  Cardiovascular: Negative  Gastrointestinal: Negative  Genitourinary: Negative for dysuria  Musculoskeletal: Negative for arthralgias  Skin: Positive for rash  Neurological: Negative  Hematological: Negative  Psychiatric/Behavioral: Negative  All other systems reviewed and are negative  Objective:      /94   Pulse 87   Temp 98 3 °F (36 8 °C) (Tympanic)   Ht 5' 5" (1 651 m)   Wt 81 9 kg (180 lb 9 6 oz)   SpO2 98%   BMI 30 05 kg/m²          Physical Exam   Constitutional: She is oriented to person, place, and time  She appears well-developed and well-nourished  Neck: Thyromegaly present  Cardiovascular: Normal rate, regular rhythm, normal heart sounds and intact distal pulses  No murmur heard  Pulmonary/Chest: Effort normal and breath sounds normal    Musculoskeletal: She exhibits no edema  Neurological: She is oriented to person, place, and time  Skin:        Dry plaques   Psychiatric: She has a normal mood and affect  Her behavior is normal  Judgment and thought content normal    Nursing note and vitals reviewed  BMI Counseling: Body mass index is 30 05 kg/m²  Discussed with patient's BMI with her  The BMI is above average  BMI counseling and education was provided to the patient  Nutrition recommendations include reducing portion sizes and moderation in carbohydrate intake  Exercise recommendations include exercising 3-5 times per week

## 2018-12-01 ENCOUNTER — HOSPITAL ENCOUNTER (OUTPATIENT)
Dept: ULTRASOUND IMAGING | Facility: HOSPITAL | Age: 48
Discharge: HOME | End: 2018-12-01
Payer: COMMERCIAL

## 2018-12-01 DIAGNOSIS — E04.1 THYROID NODULE: ICD-10-CM

## 2018-12-01 PROCEDURE — 76536 US EXAM OF HEAD AND NECK: CPT

## 2018-12-17 ENCOUNTER — OFFICE VISIT (OUTPATIENT)
Dept: ENDOCRINOLOGY | Facility: CLINIC | Age: 48
End: 2018-12-17
Payer: COMMERCIAL

## 2018-12-17 VITALS
BODY MASS INDEX: 30.66 KG/M2 | HEIGHT: 65 IN | HEART RATE: 86 BPM | DIASTOLIC BLOOD PRESSURE: 100 MMHG | WEIGHT: 184 LBS | SYSTOLIC BLOOD PRESSURE: 152 MMHG

## 2018-12-17 DIAGNOSIS — R03.0 ELEVATED BLOOD PRESSURE READING IN OFFICE WITHOUT DIAGNOSIS OF HYPERTENSION: ICD-10-CM

## 2018-12-17 DIAGNOSIS — E04.1 NONTOXIC SINGLE THYROID NODULE: Primary | ICD-10-CM

## 2018-12-17 PROCEDURE — 99213 OFFICE O/P EST LOW 20 MIN: CPT | Performed by: PHYSICIAN ASSISTANT

## 2018-12-17 NOTE — LETTER
December 18, 2018     MD Que Dunn  [address] [facility]  [address] [address]    Patient: Angelia Nichols   YOB: 1970   Date of Visit: 12/17/2018       Dear Dr Pranay Beckham: Thank you for referring Hitesh Bell to me for evaluation  Below are my notes for this consultation  We are not planning any additional thyroid ultrasounds or endocrine follow up  If you have questions, please do not hesitate to call me  Sincerely,        Bryant Li PA-C        CC: No Recipients  Bryant Li PA-C  12/18/2018  8:14 AM  Sign at close encounter    Established Patient Progress Note       Chief Complaint   Patient presents with    Thyroid Problem        History of Present Illness:     Angelia Nichols is a 50 y o  female with a history of a thyroid nodule  This has been present over 6 years  She denies dysphagia  Denies FH of thyroid CA or personal hx of radiation therapy  Thyroid function has been normal   She does follow with her family physician every 6 months and has been monitoring her Blood Pressure since it has been running high lately         Patient Active Problem List   Diagnosis    Adnexal mass    Anxiety    Constipation    Cyst of ovary    Elevated blood pressure reading in office without diagnosis of hypertension    Fibroid uterus    Nontoxic single thyroid nodule    Obesity    Polycystic ovarian syndrome    Mucous cyst of finger    Eczema      Past Medical History:   Diagnosis Date    Anxiety disorder     Fibroid     Hirsutism     Polycystic ovarian syndrome       Past Surgical History:   Procedure Laterality Date    MOUTH SURGERY        Family History   Problem Relation Age of Onset    Arthritis Mother     Depression Mother     Hypertension Mother     Thyroid disease Mother     Crohn's disease Father     Diabetes Father     Heart disease Father     Hypertension Father     Cancer Family      Social History   Substance Use Topics    Smoking status: Never Smoker    Smokeless tobacco: Never Used    Alcohol use No     Allergies   Allergen Reactions    Amoxicillin      Other reaction(s): GI Upset  Reaction Date: 12Sep2011;     Clarithromycin      Reaction Date: 12Sep2011;     Influenza Vaccines     Naproxen      Other reaction(s): GI Upset       Current Outpatient Prescriptions:     B Complex-Biotin-FA (VITAMIN B50 COMPLEX) TBCR, Take by mouth, Disp: , Rfl:     betamethasone dipropionate 0 05 % lotion, Apply topically 2 (two) times a day, Disp: 60 mL, Rfl: 1    Calcium Carb-Cholecalciferol (CALCIUM + D3) 600-200 MG-UNIT TABS, Take by mouth, Disp: , Rfl:     Multiple Vitamin (MULTI-VITAMIN DAILY) TABS, Take by mouth, Disp: , Rfl:     norgestimate-ethinyl estradiol (ORTHO TRI-CYCLEN LO) 0 18/0 215/0 25 MG-25 MCG per tablet, Take 1 tablet by mouth daily for 56 days, Disp: 28 tablet, Rfl: 13    Review of Systems   Constitutional: Negative for activity change, appetite change, chills, diaphoresis, fatigue, fever and unexpected weight change  HENT: Negative for trouble swallowing and voice change  Eyes: Negative for visual disturbance  Respiratory: Negative for shortness of breath  Cardiovascular: Negative for chest pain and palpitations  Gastrointestinal: Negative for abdominal pain, constipation and diarrhea  Endocrine: Negative for cold intolerance, heat intolerance, polydipsia, polyphagia and polyuria  Genitourinary: Negative for frequency and menstrual problem  Musculoskeletal: Negative for arthralgias and myalgias  Skin: Negative for rash  Allergic/Immunologic: Negative for food allergies  Neurological: Negative for dizziness and tremors  Hematological: Negative for adenopathy  Psychiatric/Behavioral: Negative for sleep disturbance  All other systems reviewed and are negative  Physical Exam:  Body mass index is 30 62 kg/m²    /100   Pulse 86   Ht 5' 5" (1 651 m)   Wt 83 5 kg (184 lb)   BMI 30 62 kg/m² Wt Readings from Last 3 Encounters:   12/17/18 83 5 kg (184 lb)   11/21/18 81 9 kg (180 lb 9 6 oz)   06/21/18 83 1 kg (183 lb 3 2 oz)       Physical Exam   Constitutional: She is oriented to person, place, and time  She appears well-developed and well-nourished  No distress  HENT:   Head: Normocephalic and atraumatic  Eyes: Pupils are equal, round, and reactive to light  Conjunctivae are normal    Neck: Normal range of motion  Neck supple  No thyromegaly present  Cardiovascular: Normal rate, regular rhythm and normal heart sounds  Pulmonary/Chest: Effort normal and breath sounds normal  No respiratory distress  She has no wheezes  She has no rales  Abdominal: Soft  Bowel sounds are normal  She exhibits no distension  There is no tenderness  Musculoskeletal: Normal range of motion  She exhibits no edema  Neurological: She is alert and oriented to person, place, and time  Skin: Skin is warm and dry  Psychiatric: She has a normal mood and affect  Vitals reviewed  Labs:   11/10/2018 TSH 3 420    Ultrasound 12/1/2018  COMPARISON:  12/3/2016     TECHNIQUE:   Ultrasound of the thyroid was performed with a high frequency linear transducer in transverse and sagittal planes including volumetric imaging sweeps as well as traditional still imaging technique      FINDINGS:  Thyroid parenchyma is diffusely heterogeneous in echotexture      Right lobe:  1 1 x 1 5 x 5 2 cm  Left lobe:  1 2 x 1 3 x 4 9 cm  Isthmus:  0 1 cm      There is a small left lower pole cyst   Surrounding the cyst is an ill-defined area of hypoechogenicity which corresponds to the abnormality seen on multiple prior ultrasounds  This has not significantly changed in appearance since at least 2012      IMPRESSION:     Ill-defined hypoechogenicity surrounding a small colloid cyst in the left lower pole corresponding to the previously described nodular area    Given lack of discrete borders, this is likely an area of heterogeneity, less conspicuous than on prior studies   due to increasing heterogeneity of the surrounding thyroid parenchyma  As this has not significantly changed for greater than 5 years, continued ultrasound follow-up is not required unless otherwise clinically indicated  Impression & Plan:    Problem List Items Addressed This Visit     Elevated blood pressure reading in office without diagnosis of hypertension    Nontoxic single thyroid nodule - Primary     Small and stable for over 5 years  Will not order any additional follow up testing at this time unless there is a change in clinical exam    Thyroid function remains normal   She will follow up with her family physician for continued care  No orders of the defined types were placed in this encounter  There are no Patient Instructions on file for this visit  Discussed with the patient and all questioned fully answered  She will call me if any problems arise        Follow-up appointment if needed    Counseled patient on diagnostic results, prognosis, risk and benefit of treatment options, instruction for management, importance of treatment compliance, Risk  factor reduction and impressions      Anna Guevara PA-C

## 2018-12-17 NOTE — PROGRESS NOTES
Established Patient Progress Note       Chief Complaint   Patient presents with    Thyroid Problem        History of Present Illness:     Enriqueta Dinh is a 50 y o  female with a history of a thyroid nodule  This has been present over 6 years  She denies dysphagia  Denies FH of thyroid CA or personal hx of radiation therapy  Thyroid function has been normal   She does follow with her family physician every 6 months and has been monitoring her Blood Pressure since it has been running high lately         Patient Active Problem List   Diagnosis    Adnexal mass    Anxiety    Constipation    Cyst of ovary    Elevated blood pressure reading in office without diagnosis of hypertension    Fibroid uterus    Nontoxic single thyroid nodule    Obesity    Polycystic ovarian syndrome    Mucous cyst of finger    Eczema      Past Medical History:   Diagnosis Date    Anxiety disorder     Fibroid     Hirsutism     Polycystic ovarian syndrome       Past Surgical History:   Procedure Laterality Date    MOUTH SURGERY        Family History   Problem Relation Age of Onset    Arthritis Mother     Depression Mother     Hypertension Mother     Thyroid disease Mother     Crohn's disease Father     Diabetes Father     Heart disease Father     Hypertension Father     Cancer Family      Social History   Substance Use Topics    Smoking status: Never Smoker    Smokeless tobacco: Never Used    Alcohol use No     Allergies   Allergen Reactions    Amoxicillin      Other reaction(s): GI Upset  Reaction Date: 12Sep2011;     Clarithromycin      Reaction Date: 12Sep2011;     Influenza Vaccines     Naproxen      Other reaction(s): GI Upset       Current Outpatient Prescriptions:     B Complex-Biotin-FA (VITAMIN B50 COMPLEX) TBCR, Take by mouth, Disp: , Rfl:     betamethasone dipropionate 0 05 % lotion, Apply topically 2 (two) times a day, Disp: 60 mL, Rfl: 1    Calcium Carb-Cholecalciferol (CALCIUM + D3) 600-200 MG-UNIT TABS, Take by mouth, Disp: , Rfl:     Multiple Vitamin (MULTI-VITAMIN DAILY) TABS, Take by mouth, Disp: , Rfl:     norgestimate-ethinyl estradiol (ORTHO TRI-CYCLEN LO) 0 18/0 215/0 25 MG-25 MCG per tablet, Take 1 tablet by mouth daily for 56 days, Disp: 28 tablet, Rfl: 13    Review of Systems   Constitutional: Negative for activity change, appetite change, chills, diaphoresis, fatigue, fever and unexpected weight change  HENT: Negative for trouble swallowing and voice change  Eyes: Negative for visual disturbance  Respiratory: Negative for shortness of breath  Cardiovascular: Negative for chest pain and palpitations  Gastrointestinal: Negative for abdominal pain, constipation and diarrhea  Endocrine: Negative for cold intolerance, heat intolerance, polydipsia, polyphagia and polyuria  Genitourinary: Negative for frequency and menstrual problem  Musculoskeletal: Negative for arthralgias and myalgias  Skin: Negative for rash  Allergic/Immunologic: Negative for food allergies  Neurological: Negative for dizziness and tremors  Hematological: Negative for adenopathy  Psychiatric/Behavioral: Negative for sleep disturbance  All other systems reviewed and are negative  Physical Exam:  Body mass index is 30 62 kg/m²  /100   Pulse 86   Ht 5' 5" (1 651 m)   Wt 83 5 kg (184 lb)   BMI 30 62 kg/m²    Wt Readings from Last 3 Encounters:   12/17/18 83 5 kg (184 lb)   11/21/18 81 9 kg (180 lb 9 6 oz)   06/21/18 83 1 kg (183 lb 3 2 oz)       Physical Exam   Constitutional: She is oriented to person, place, and time  She appears well-developed and well-nourished  No distress  HENT:   Head: Normocephalic and atraumatic  Eyes: Pupils are equal, round, and reactive to light  Conjunctivae are normal    Neck: Normal range of motion  Neck supple  No thyromegaly present  Cardiovascular: Normal rate, regular rhythm and normal heart sounds      Pulmonary/Chest: Effort normal and breath sounds normal  No respiratory distress  She has no wheezes  She has no rales  Abdominal: Soft  Bowel sounds are normal  She exhibits no distension  There is no tenderness  Musculoskeletal: Normal range of motion  She exhibits no edema  Neurological: She is alert and oriented to person, place, and time  Skin: Skin is warm and dry  Psychiatric: She has a normal mood and affect  Vitals reviewed  Labs:   11/10/2018 TSH 3 420    Ultrasound 12/1/2018  COMPARISON:  12/3/2016     TECHNIQUE:   Ultrasound of the thyroid was performed with a high frequency linear transducer in transverse and sagittal planes including volumetric imaging sweeps as well as traditional still imaging technique      FINDINGS:  Thyroid parenchyma is diffusely heterogeneous in echotexture      Right lobe:  1 1 x 1 5 x 5 2 cm  Left lobe:  1 2 x 1 3 x 4 9 cm  Isthmus:  0 1 cm      There is a small left lower pole cyst   Surrounding the cyst is an ill-defined area of hypoechogenicity which corresponds to the abnormality seen on multiple prior ultrasounds  This has not significantly changed in appearance since at least 2012      IMPRESSION:     Ill-defined hypoechogenicity surrounding a small colloid cyst in the left lower pole corresponding to the previously described nodular area  Given lack of discrete borders, this is likely an area of heterogeneity, less conspicuous than on prior studies   due to increasing heterogeneity of the surrounding thyroid parenchyma  As this has not significantly changed for greater than 5 years, continued ultrasound follow-up is not required unless otherwise clinically indicated  Impression & Plan:    Problem List Items Addressed This Visit     Elevated blood pressure reading in office without diagnosis of hypertension    Nontoxic single thyroid nodule - Primary     Small and stable for over 5 years    Will not order any additional follow up testing at this time unless there is a change in clinical exam    Thyroid function remains normal   She will follow up with her family physician for continued care  No orders of the defined types were placed in this encounter  There are no Patient Instructions on file for this visit  Discussed with the patient and all questioned fully answered  She will call me if any problems arise        Follow-up appointment if needed    Counseled patient on diagnostic results, prognosis, risk and benefit of treatment options, instruction for management, importance of treatment compliance, Risk  factor reduction and impressions      Naomy Bardales PA-C

## 2018-12-18 NOTE — ASSESSMENT & PLAN NOTE
Small and stable for over 5 years  Will not order any additional follow up testing at this time unless there is a change in clinical exam    Thyroid function remains normal   She will follow up with her family physician for continued care

## 2018-12-22 ENCOUNTER — HOSPITAL ENCOUNTER (OUTPATIENT)
Dept: ULTRASOUND IMAGING | Facility: HOSPITAL | Age: 48
Discharge: HOME | End: 2018-12-22
Attending: OBSTETRICS & GYNECOLOGY
Payer: COMMERCIAL

## 2018-12-22 DIAGNOSIS — N80.0 ADENOMYOSIS: ICD-10-CM

## 2018-12-22 PROCEDURE — 76830 TRANSVAGINAL US NON-OB: CPT

## 2018-12-22 PROCEDURE — 76856 US EXAM PELVIC COMPLETE: CPT

## 2018-12-31 DIAGNOSIS — D21.9 FIBROIDS: ICD-10-CM

## 2018-12-31 DIAGNOSIS — N83.202 CYST OF LEFT OVARY: Primary | ICD-10-CM

## 2019-01-10 ENCOUNTER — OFFICE VISIT (OUTPATIENT)
Dept: FAMILY MEDICINE CLINIC | Facility: HOSPITAL | Age: 49
End: 2019-01-10
Payer: COMMERCIAL

## 2019-01-10 VITALS
HEIGHT: 65 IN | TEMPERATURE: 99.2 F | WEIGHT: 182 LBS | HEART RATE: 84 BPM | BODY MASS INDEX: 30.32 KG/M2 | SYSTOLIC BLOOD PRESSURE: 140 MMHG | DIASTOLIC BLOOD PRESSURE: 88 MMHG

## 2019-01-10 DIAGNOSIS — K29.00 OTHER ACUTE GASTRITIS WITHOUT HEMORRHAGE: Primary | ICD-10-CM

## 2019-01-10 PROCEDURE — 99213 OFFICE O/P EST LOW 20 MIN: CPT | Performed by: NURSE PRACTITIONER

## 2019-01-10 NOTE — PROGRESS NOTES
Assessment/Plan:     Likely gastritis  Possible viral etiology  Cannot exclude gallbladder etiology  Can trial OTC Prilosec or Zantac over the next few days  [place] diet  Eat small, frequent meals  At this point symptoms seem to be improving so will watch for now  Instructed to go to ER if abdominal pain intensifies or fever > 101 or any blood in stool  Call if no improvement in 3-5 days  Diagnoses and all orders for this visit:    Other acute gastritis without hemorrhage          Subjective:     Patient ID: Pavan Cardenas is a 50 y o  female  5 days ago started with abdominal bloating and discomfort  2 days later started mid abdominal pain  Would come and go  Felt like she had to have a BM and took fiber and laxative  Had normal BM today  Gets nauseated with pain  Eating can make pain worse  Did have some plain pasta and waffle  Pain has not been too bad the last few hours  Pressing on abdomen makes worse  Feels like a knot  Bloating and gas has resolved  Denies sick contacts  Had GERD but has resolved  Occasional constipation issues  No blood in stool  Low grade fever last night  Review of Systems   Constitutional: Positive for appetite change and fever  Negative for unexpected weight change  Gastrointestinal: Positive for abdominal distention, abdominal pain, constipation and nausea  Negative for blood in stool, diarrhea and vomiting  The following portions of the patient's history were reviewed and updated as appropriate: allergies, current medications, past family history, past medical history, past social history, past surgical history and problem list     Objective:  Vitals:    01/10/19 1328   BP: 140/88   Pulse: 84   Temp: 99 2 °F (37 3 °C)      Physical Exam   Constitutional: She is oriented to person, place, and time  She appears well-developed and well-nourished  Cardiovascular: Normal rate, regular rhythm and normal heart sounds  No murmur heard    Pulmonary/Chest: Effort normal and breath sounds normal    Abdominal: Soft  Normal appearance  Bowel sounds are decreased  There is no hepatosplenomegaly  There is tenderness in the epigastric area  There is negative Ridley's sign  Neurological: She is alert and oriented to person, place, and time  Skin: Skin is warm and dry  Psychiatric: She has a normal mood and affect

## 2019-01-18 ENCOUNTER — OFFICE VISIT (OUTPATIENT)
Dept: FAMILY MEDICINE CLINIC | Facility: HOSPITAL | Age: 49
End: 2019-01-18
Payer: COMMERCIAL

## 2019-01-18 VITALS
HEART RATE: 85 BPM | TEMPERATURE: 98.3 F | SYSTOLIC BLOOD PRESSURE: 148 MMHG | WEIGHT: 180 LBS | BODY MASS INDEX: 29.99 KG/M2 | DIASTOLIC BLOOD PRESSURE: 82 MMHG | HEIGHT: 65 IN

## 2019-01-18 DIAGNOSIS — H00.011 HORDEOLUM EXTERNUM OF RIGHT UPPER EYELID: Primary | ICD-10-CM

## 2019-01-18 PROCEDURE — 3008F BODY MASS INDEX DOCD: CPT | Performed by: FAMILY MEDICINE

## 2019-01-18 PROCEDURE — 99213 OFFICE O/P EST LOW 20 MIN: CPT | Performed by: FAMILY MEDICINE

## 2019-01-18 PROCEDURE — 1036F TOBACCO NON-USER: CPT | Performed by: FAMILY MEDICINE

## 2019-01-18 RX ORDER — POLYMYXIN B SULFATE AND TRIMETHOPRIM 1; 10000 MG/ML; [USP'U]/ML
1 SOLUTION OPHTHALMIC EVERY 6 HOURS
Qty: 10 ML | Refills: 0 | Status: SHIPPED | OUTPATIENT
Start: 2019-01-18 | End: 2019-03-19

## 2019-01-18 NOTE — PROGRESS NOTES
Assessment/Plan:         Diagnoses and all orders for this visit:    Hordeolum externum of right upper eyelid  Comments:  Discussed lid hygiene, to use No Tears baby shampoo to cleanse lids  Orders:  -     polymyxin b-trimethoprim (POLYTRIM) ophthalmic solution; Administer 1 drop to the right eye every 6 (six) hours          Subjective:      Patient ID: Latasha Briggs is a 50 y o  female  Gi ISSUES FROM LAST WEEK ARE BETTER  Having swelling and some pain in the R eye, with history of dry eyes  Using OTC moisturizer and compresses  Has history of lower eyelid styes, never upper lid          The following portions of the patient's history were reviewed and updated as appropriate: allergies, current medications, past family history, past medical history, past social history, past surgical history and problem list     Review of Systems   Constitutional: Negative for fever  HENT: Negative for congestion  Eyes: Positive for pain, discharge and redness  Negative for visual disturbance  Respiratory: Negative  Cardiovascular: Negative  Objective:      /82   Pulse 85   Temp 98 3 °F (36 8 °C)   Ht 5' 5" (1 651 m)   Wt 81 6 kg (180 lb)   BMI 29 95 kg/m²          Physical Exam   Eyes:       Swelling and stye middle of R upper eyelid    No drainage

## 2019-03-19 ENCOUNTER — OFFICE VISIT (OUTPATIENT)
Dept: FAMILY MEDICINE CLINIC | Facility: HOSPITAL | Age: 49
End: 2019-03-19
Payer: COMMERCIAL

## 2019-03-19 VITALS
HEART RATE: 90 BPM | SYSTOLIC BLOOD PRESSURE: 120 MMHG | WEIGHT: 177.2 LBS | BODY MASS INDEX: 29.52 KG/M2 | DIASTOLIC BLOOD PRESSURE: 90 MMHG | OXYGEN SATURATION: 98 % | TEMPERATURE: 99.6 F | HEIGHT: 65 IN

## 2019-03-19 DIAGNOSIS — J06.9 UPPER RESPIRATORY TRACT INFECTION, UNSPECIFIED TYPE: Primary | ICD-10-CM

## 2019-03-19 LAB — S PYO AG THROAT QL: NEGATIVE

## 2019-03-19 PROCEDURE — 1036F TOBACCO NON-USER: CPT | Performed by: NURSE PRACTITIONER

## 2019-03-19 PROCEDURE — 3008F BODY MASS INDEX DOCD: CPT | Performed by: NURSE PRACTITIONER

## 2019-03-19 PROCEDURE — 99213 OFFICE O/P EST LOW 20 MIN: CPT | Performed by: NURSE PRACTITIONER

## 2019-03-19 PROCEDURE — 87880 STREP A ASSAY W/OPTIC: CPT | Performed by: NURSE PRACTITIONER

## 2019-03-19 RX ORDER — NORGESTIMATE AND ETHINYL ESTRADIOL 7DAYSX3 LO
KIT ORAL
COMMUNITY
Start: 2019-02-20 | End: 2019-07-02 | Stop reason: SDUPTHER

## 2019-03-19 NOTE — PATIENT INSTRUCTIONS
Pharyngitis   AMBULATORY CARE:   Pharyngitis , or sore throat, is inflammation of the tissues and structures in your pharynx (throat)  Pharyngitis is most often caused by bacteria  It may also be caused by a cold or flu virus  Other causes include smoking, allergies, or acid reflux  Signs and symptoms that may occur with pharyngitis:   · Sore throat or pain when you swallow    · Fever, chills, and body aches    · Hoarse or raspy voice    · Cough, runny or stuffy nose, itchy or watery eyes    · Headache    · Upset stomach and loss of appetite    · Mild neck stiffness    · Swollen glands that feel like hard lumps when you touch your neck    · White and yellow pus-filled blisters in the back of your throat  Call 911 for any of the following:   · You have trouble breathing or swallowing because your throat is swollen or sore  Seek care immediately if:   · You are drooling because it hurts too much to swallow  · Your fever is higher than 102? F (39?C) or lasts longer than 3 days  · You are confused  · You taste blood in your throat  Contact your healthcare provider if:   · Your throat pain gets worse  · You have a painful lump in your throat that does not go away after 5 days  · Your symptoms do not improve after 5 days  · You have questions or concerns about your condition or care  Treatment for pharyngitis:  Viral pharyngitis will go away on its own without treatment  Your sore throat should start to feel better in 3 to 5 days for both viral and bacterial infections  You may need any of the following:  · Antibiotics  treat a bacterial infection  · NSAIDs , such as ibuprofen, help decrease swelling, pain, and fever  NSAIDs can cause stomach bleeding or kidney problems in certain people  If you take blood thinner medicine, always ask your healthcare provider if NSAIDs are safe for you  Always read the medicine label and follow directions  · Acetaminophen  decreases pain and fever   It is available without a doctor's order  Ask how much to take and how often to take it  Follow directions  Acetaminophen can cause liver damage if not taken correctly  Manage your symptoms:   · Gargle salt water  Mix ¼ teaspoon salt in an 8 ounce glass of warm water and gargle  This may help decrease swelling in your throat  · Drink liquids as directed  You may need to drink more liquids than usual  Liquids may help soothe your throat and prevent dehydration  Ask how much liquid to drink each day and which liquids are best for you  · Use a cool-steam humidifier  to help moisten the air in your room and calm your cough  · Soothe your throat  with cough drops, ice, soft foods, or popsicles  Prevent the spread of pharyngitis:  Cover your mouth and nose when you cough or sneeze  Do not share food or drinks  Wash your hands often  Use soap and water  If soap and water are unavailable, use an alcohol based hand   Follow up with your healthcare provider as directed:  Write down your questions so you remember to ask them during your visits  © 2017 [address] [facility] Information is for End User's use only and may not be sold, redistributed or otherwise used for commercial purposes  All illustrations and images included in CareNotes® are the copyrighted property of [employer]  or [employer]ainer [employer]  The above information is an  only  It is not intended as medical advice for individual conditions or treatments  Talk to your doctor, nurse or pharmacist before following any medical regimen to see if it is safe and effective for you

## 2019-03-19 NOTE — PROGRESS NOTES
Assessment/Plan:    No problem-specific Assessment & Plan notes found for this encounter  Diagnoses and all orders for this visit:    Upper respiratory tract infection, unspecified type  Comments:  rapid strep neg, will send culture to confirm; treat as allergic vs viral w/advil sinus prn and start daily antihistamine, she declined rx for nasal spray   Orders:  -     POCT rapid strepA  -     Throat culture    Other orders  -     TRI-LO-ESTARYLLA 0 18/0 215/0 25 MG-25 MCG per tablet          Subjective:      Patient ID: Urmila Easley is a 50 y o  female here for an acute visit  Has had a sore throat for past 2 days  Taking advil cold/sinus  Feels weird into right ear, "clogged", notes more when sitting  No ill contacts  May have allergies, but doesn't usually get sore throat with  The following portions of the patient's history were reviewed and updated as appropriate: allergies, current medications, past family history, past medical history, past social history, past surgical history and problem list     Review of Systems   Constitutional: Negative for fever  HENT: Positive for congestion, ear pain (R>L), postnasal drip, sore throat and trouble swallowing  Respiratory: Positive for cough (started today)  Negative for shortness of breath  Objective:      /90 (Patient Position: Sitting, Cuff Size: Standard)   Pulse 90   Temp 99 6 °F (37 6 °C) (Tympanic)   Ht 5' 5" (1 651 m)   Wt 80 4 kg (177 lb 3 2 oz)   SpO2 98%   BMI 29 49 kg/m²          Physical Exam   Constitutional: She is oriented to person, place, and time  She appears well-developed and well-nourished  She does not appear ill  No distress  HENT:   Head: Normocephalic and atraumatic  Right Ear: A middle ear effusion is present  Left Ear: A middle ear effusion is present  Nose: Right sinus exhibits no maxillary sinus tenderness and no frontal sinus tenderness   Left sinus exhibits no maxillary sinus tenderness and no frontal sinus tenderness  Mouth/Throat: Posterior oropharyngeal erythema present  No oropharyngeal exudate  Tonsils are 1+ on the right  Tonsils are 1+ on the left  No tonsillar exudate  Cardiovascular: Normal rate and regular rhythm  Pulmonary/Chest: Effort normal and breath sounds normal    No cough   Lymphadenopathy:     She has no cervical adenopathy  Neurological: She is alert and oriented to person, place, and time  Skin: Skin is warm and dry  Psychiatric: She has a normal mood and affect  Her behavior is normal    Vitals reviewed

## 2019-03-21 ENCOUNTER — TELEPHONE (OUTPATIENT)
Dept: FAMILY MEDICINE CLINIC | Facility: HOSPITAL | Age: 49
End: 2019-03-21

## 2019-03-21 LAB — B-HEM STREP SPEC QL CULT: NEGATIVE

## 2019-04-06 ENCOUNTER — HOSPITAL ENCOUNTER (OUTPATIENT)
Dept: ULTRASOUND IMAGING | Facility: HOSPITAL | Age: 49
Discharge: HOME | End: 2019-04-06
Attending: OBSTETRICS & GYNECOLOGY
Payer: COMMERCIAL

## 2019-04-06 DIAGNOSIS — D21.9 FIBROIDS: ICD-10-CM

## 2019-04-06 DIAGNOSIS — N83.202 CYST OF LEFT OVARY: ICD-10-CM

## 2019-04-06 PROCEDURE — 76856 US EXAM PELVIC COMPLETE: CPT

## 2019-04-06 PROCEDURE — 76830 TRANSVAGINAL US NON-OB: CPT

## 2019-04-11 ENCOUNTER — TELEPHONE (OUTPATIENT)
Dept: OBGYN CLINIC | Facility: CLINIC | Age: 49
End: 2019-04-11

## 2019-05-22 ENCOUNTER — OFFICE VISIT (OUTPATIENT)
Dept: FAMILY MEDICINE CLINIC | Facility: HOSPITAL | Age: 49
End: 2019-05-22
Payer: COMMERCIAL

## 2019-05-22 VITALS
HEART RATE: 79 BPM | WEIGHT: 176.8 LBS | DIASTOLIC BLOOD PRESSURE: 70 MMHG | BODY MASS INDEX: 29.46 KG/M2 | TEMPERATURE: 99.2 F | HEIGHT: 65 IN | SYSTOLIC BLOOD PRESSURE: 124 MMHG

## 2019-05-22 DIAGNOSIS — R03.0 ELEVATED BLOOD PRESSURE READING IN OFFICE WITHOUT DIAGNOSIS OF HYPERTENSION: Primary | ICD-10-CM

## 2019-05-22 DIAGNOSIS — E04.1 NONTOXIC SINGLE THYROID NODULE: ICD-10-CM

## 2019-05-22 DIAGNOSIS — E28.2 POLYCYSTIC OVARIAN SYNDROME: ICD-10-CM

## 2019-05-22 DIAGNOSIS — Z13.1 SCREENING FOR DIABETES MELLITUS: ICD-10-CM

## 2019-05-22 PROCEDURE — 99213 OFFICE O/P EST LOW 20 MIN: CPT | Performed by: FAMILY MEDICINE

## 2019-05-22 PROCEDURE — 1036F TOBACCO NON-USER: CPT | Performed by: FAMILY MEDICINE

## 2019-05-22 PROCEDURE — 3008F BODY MASS INDEX DOCD: CPT | Performed by: FAMILY MEDICINE

## 2019-07-02 DIAGNOSIS — IMO0001 CONTRACEPTION: Primary | ICD-10-CM

## 2019-07-02 RX ORDER — NORGESTIMATE AND ETHINYL ESTRADIOL 7DAYSX3 LO
1 KIT ORAL DAILY
Qty: 28 TABLET | Refills: 0 | Status: SHIPPED | OUTPATIENT
Start: 2019-07-02 | End: 2019-07-18 | Stop reason: ALTCHOICE

## 2019-07-02 NOTE — TELEPHONE ENCOUNTER
Patient called for BCP refill  Wants it filled on 7/11/19  Patient has appointment on 7/18/19  Escript sent  Note included for Pharmacist to fill on 7/11/19

## 2019-07-10 DIAGNOSIS — Z30.41 ENCOUNTER FOR SURVEILLANCE OF CONTRACEPTIVE PILLS: ICD-10-CM

## 2019-07-18 ENCOUNTER — ANNUAL EXAM (OUTPATIENT)
Dept: OBGYN CLINIC | Facility: CLINIC | Age: 49
End: 2019-07-18
Payer: COMMERCIAL

## 2019-07-18 VITALS
BODY MASS INDEX: 28.28 KG/M2 | DIASTOLIC BLOOD PRESSURE: 90 MMHG | HEIGHT: 66 IN | SYSTOLIC BLOOD PRESSURE: 146 MMHG | WEIGHT: 176 LBS

## 2019-07-18 DIAGNOSIS — Z12.31 ENCOUNTER FOR SCREENING MAMMOGRAM FOR BREAST CANCER: ICD-10-CM

## 2019-07-18 DIAGNOSIS — Z01.419 ENCOUNTER FOR ANNUAL ROUTINE GYNECOLOGICAL EXAMINATION: ICD-10-CM

## 2019-07-18 DIAGNOSIS — E28.2 PCO (POLYCYSTIC OVARIES): ICD-10-CM

## 2019-07-18 DIAGNOSIS — Z12.31 ENCOUNTER FOR SCREENING MAMMOGRAM FOR MALIGNANT NEOPLASM OF BREAST: Primary | ICD-10-CM

## 2019-07-18 DIAGNOSIS — Z30.41 ENCOUNTER FOR SURVEILLANCE OF CONTRACEPTIVE PILLS: ICD-10-CM

## 2019-07-18 DIAGNOSIS — D21.9 FIBROIDS: ICD-10-CM

## 2019-07-18 PROCEDURE — S0612 ANNUAL GYNECOLOGICAL EXAMINA: HCPCS | Performed by: OBSTETRICS & GYNECOLOGY

## 2019-07-18 RX ORDER — ACETAMINOPHEN AND CODEINE PHOSPHATE 120; 12 MG/5ML; MG/5ML
1 SOLUTION ORAL DAILY
Qty: 28 TABLET | Refills: 3 | Status: SHIPPED | OUTPATIENT
Start: 2019-07-18 | End: 2019-11-05 | Stop reason: SDUPTHER

## 2019-09-28 ENCOUNTER — HOSPITAL ENCOUNTER (OUTPATIENT)
Dept: MAMMOGRAPHY | Facility: CLINIC | Age: 49
Discharge: HOME | End: 2019-09-28
Payer: COMMERCIAL

## 2019-09-28 VITALS — BODY MASS INDEX: 29.32 KG/M2 | WEIGHT: 176 LBS | HEIGHT: 65 IN

## 2019-09-28 DIAGNOSIS — Z12.31 ENCOUNTER FOR SCREENING MAMMOGRAM FOR MALIGNANT NEOPLASM OF BREAST: ICD-10-CM

## 2019-09-28 PROCEDURE — 77063 BREAST TOMOSYNTHESIS BI: CPT

## 2019-09-28 PROCEDURE — 77067 SCR MAMMO BI INCL CAD: CPT

## 2019-10-03 ENCOUNTER — OFFICE VISIT (OUTPATIENT)
Dept: OBGYN CLINIC | Facility: CLINIC | Age: 49
End: 2019-10-03
Payer: COMMERCIAL

## 2019-10-03 DIAGNOSIS — E28.2 PCO (POLYCYSTIC OVARIES): Primary | ICD-10-CM

## 2019-10-03 PROCEDURE — 99213 OFFICE O/P EST LOW 20 MIN: CPT | Performed by: OBSTETRICS & GYNECOLOGY

## 2019-10-03 NOTE — PROGRESS NOTES
Assessment/Plan:     PCO - she will continue the progesterone only pill now  I encourage her to consider stopping the pills to see what her cycles will do as they may start spacing out  This would be okay as long as she keeps track of them     She will consider this  We briefly discussed perimenopause  I did not renew her progesterone only pill  She has 1 pack left  She will discuss with the family doctor  I do not believe this pill is contributing to her blood pressure however I am not sure she still needs it for polycystic ovarian syndrome  She does not need contraception  Elevated BP - she has a visit coming up with Dr Ana Reno, she will discuss this with him  There are no diagnoses linked to this encounter  Subjective:     Patient ID: Kendell Gonzalez is a 52 y o  female  Patient here for a pill check  She was started on progesterone only pills because her blood pressure was elevated  Prior to this she was on Ortho Tri-Cyclen Lo for many years due to polycystic ovarian syndrome  She would often skip menstrual cycles  She has been on the progesterone only pill and doing well  She has fairly good cycle control  Her fibroids and simple ovarian cyst are stable on ultrasound  Her blood pressure is still mildly elevated  She is asymptomatic  She has an appointment soon for a physical with her family doctor  Review of Systems   Constitutional: Negative  Gastrointestinal: Negative  Genitourinary: Negative            Objective:     Physical Exam

## 2019-10-05 VITALS
SYSTOLIC BLOOD PRESSURE: 146 MMHG | BODY MASS INDEX: 29.99 KG/M2 | HEIGHT: 65 IN | DIASTOLIC BLOOD PRESSURE: 90 MMHG | WEIGHT: 180 LBS

## 2019-11-05 ENCOUNTER — TELEPHONE (OUTPATIENT)
Dept: OBGYN CLINIC | Facility: CLINIC | Age: 49
End: 2019-11-05

## 2019-11-05 DIAGNOSIS — E28.2 PCO (POLYCYSTIC OVARIES): ICD-10-CM

## 2019-11-05 RX ORDER — ACETAMINOPHEN AND CODEINE PHOSPHATE 120; 12 MG/5ML; MG/5ML
1 SOLUTION ORAL DAILY
Qty: 28 TABLET | Refills: 3 | Status: SHIPPED | OUTPATIENT
Start: 2019-11-05 | End: 2020-02-24 | Stop reason: SDUPTHER

## 2019-11-05 NOTE — TELEPHONE ENCOUNTER
----- Message from Anel King DO sent at 11/5/2019  4:28 PM EST -----  Please let Bulmaro Quinn know I renewed her progesterone only pill but she can't go back on her combination pills because of her blood pressure

## 2019-11-09 LAB — HBA1C MFR BLD HPLC: 5.3 %

## 2019-11-10 LAB
ALBUMIN SERPL-MCNC: 4.5 G/DL (ref 3.5–5.5)
ALBUMIN/GLOB SERPL: 1.8 {RATIO} (ref 1.2–2.2)
ALP SERPL-CCNC: 62 IU/L (ref 39–117)
ALT SERPL-CCNC: 19 IU/L (ref 0–32)
AST SERPL-CCNC: 15 IU/L (ref 0–40)
BASOPHILS # BLD AUTO: 0 X10E3/UL (ref 0–0.2)
BASOPHILS NFR BLD AUTO: 1 %
BILIRUB SERPL-MCNC: 0.4 MG/DL (ref 0–1.2)
BUN SERPL-MCNC: 12 MG/DL (ref 6–24)
BUN/CREAT SERPL: 17 (ref 9–23)
CALCIUM SERPL-MCNC: 9.3 MG/DL (ref 8.7–10.2)
CHLORIDE SERPL-SCNC: 101 MMOL/L (ref 96–106)
CHOLEST SERPL-MCNC: 166 MG/DL (ref 100–199)
CO2 SERPL-SCNC: 23 MMOL/L (ref 20–29)
CREAT SERPL-MCNC: 0.72 MG/DL (ref 0.57–1)
EOSINOPHIL # BLD AUTO: 0.1 X10E3/UL (ref 0–0.4)
EOSINOPHIL NFR BLD AUTO: 1 %
ERYTHROCYTE [DISTWIDTH] IN BLOOD BY AUTOMATED COUNT: 12.6 % (ref 12.3–15.4)
EST. AVERAGE GLUCOSE BLD GHB EST-MCNC: 105 MG/DL
GLOBULIN SER-MCNC: 2.5 G/DL (ref 1.5–4.5)
GLUCOSE SERPL-MCNC: 85 MG/DL (ref 65–99)
HBA1C MFR BLD: 5.3 % (ref 4.8–5.6)
HCT VFR BLD AUTO: 41 % (ref 34–46.6)
HDLC SERPL-MCNC: 40 MG/DL
HGB BLD-MCNC: 14 G/DL (ref 11.1–15.9)
IMM GRANULOCYTES # BLD: 0 X10E3/UL (ref 0–0.1)
IMM GRANULOCYTES NFR BLD: 0 %
LDLC SERPL CALC-MCNC: 98 MG/DL (ref 0–99)
LDLC/HDLC SERPL: 2.5 RATIO (ref 0–3.2)
LYMPHOCYTES # BLD AUTO: 2.2 X10E3/UL (ref 0.7–3.1)
LYMPHOCYTES NFR BLD AUTO: 39 %
MCH RBC QN AUTO: 29.9 PG (ref 26.6–33)
MCHC RBC AUTO-ENTMCNC: 34.1 G/DL (ref 31.5–35.7)
MCV RBC AUTO: 88 FL (ref 79–97)
MONOCYTES # BLD AUTO: 0.4 X10E3/UL (ref 0.1–0.9)
MONOCYTES NFR BLD AUTO: 7 %
NEUTROPHILS # BLD AUTO: 2.9 X10E3/UL (ref 1.4–7)
NEUTROPHILS NFR BLD AUTO: 52 %
PLATELET # BLD AUTO: 400 X10E3/UL (ref 150–450)
POTASSIUM SERPL-SCNC: 4.3 MMOL/L (ref 3.5–5.2)
PROT SERPL-MCNC: 7 G/DL (ref 6–8.5)
RBC # BLD AUTO: 4.68 X10E6/UL (ref 3.77–5.28)
SL AMB EGFR AFRICAN AMERICAN: 114 ML/MIN/1.73
SL AMB EGFR NON AFRICAN AMERICAN: 99 ML/MIN/1.73
SL AMB VLDL CHOLESTEROL CALC: 28 MG/DL (ref 5–40)
SODIUM SERPL-SCNC: 138 MMOL/L (ref 134–144)
TRIGL SERPL-MCNC: 142 MG/DL (ref 0–149)
TSH SERPL DL<=0.005 MIU/L-ACNC: 3.3 UIU/ML (ref 0.45–4.5)
WBC # BLD AUTO: 5.7 X10E3/UL (ref 3.4–10.8)

## 2019-11-10 PROCEDURE — 3044F HG A1C LEVEL LT 7.0%: CPT | Performed by: FAMILY MEDICINE

## 2019-11-11 ENCOUNTER — DOCUMENTATION (OUTPATIENT)
Dept: OBGYN CLINIC | Facility: CLINIC | Age: 49
End: 2019-11-11

## 2019-11-11 NOTE — PROGRESS NOTES
Pt needs a refill on her ocs  I sent refill for progesterone only pills  As per our last visit, her BP is elevated and she cannot continue her ortho tri cyclen lo  She was taking this for PCO  I suggested stopping all pills and monitoring her cycle  She preferred to stay on progesterone only pills    She called asking for ortho tri cyclen lo but this is not recommended with her elevated BP

## 2019-11-20 ENCOUNTER — OFFICE VISIT (OUTPATIENT)
Dept: FAMILY MEDICINE CLINIC | Facility: HOSPITAL | Age: 49
End: 2019-11-20
Payer: COMMERCIAL

## 2019-11-20 VITALS
SYSTOLIC BLOOD PRESSURE: 124 MMHG | TEMPERATURE: 98.8 F | HEIGHT: 65 IN | HEART RATE: 72 BPM | DIASTOLIC BLOOD PRESSURE: 86 MMHG | WEIGHT: 181.6 LBS | BODY MASS INDEX: 30.26 KG/M2

## 2019-11-20 DIAGNOSIS — F41.9 ANXIETY: ICD-10-CM

## 2019-11-20 DIAGNOSIS — E28.2 POLYCYSTIC OVARIAN SYNDROME: ICD-10-CM

## 2019-11-20 DIAGNOSIS — K59.01 SLOW TRANSIT CONSTIPATION: ICD-10-CM

## 2019-11-20 DIAGNOSIS — E04.1 NONTOXIC SINGLE THYROID NODULE: ICD-10-CM

## 2019-11-20 DIAGNOSIS — R03.0 ELEVATED BLOOD PRESSURE READING IN OFFICE WITHOUT DIAGNOSIS OF HYPERTENSION: Primary | ICD-10-CM

## 2019-11-20 DIAGNOSIS — E66.09 CLASS 1 OBESITY DUE TO EXCESS CALORIES WITHOUT SERIOUS COMORBIDITY WITH BODY MASS INDEX (BMI) OF 30.0 TO 30.9 IN ADULT: ICD-10-CM

## 2019-11-20 PROCEDURE — 99214 OFFICE O/P EST MOD 30 MIN: CPT | Performed by: FAMILY MEDICINE

## 2019-11-27 ENCOUNTER — OFFICE VISIT (OUTPATIENT)
Dept: FAMILY MEDICINE CLINIC | Facility: HOSPITAL | Age: 49
End: 2019-11-27
Payer: COMMERCIAL

## 2019-11-27 VITALS
HEART RATE: 89 BPM | SYSTOLIC BLOOD PRESSURE: 170 MMHG | WEIGHT: 179 LBS | BODY MASS INDEX: 29.82 KG/M2 | TEMPERATURE: 98.8 F | HEIGHT: 65 IN | DIASTOLIC BLOOD PRESSURE: 90 MMHG

## 2019-11-27 DIAGNOSIS — K59.00 OBSTIPATION: Primary | ICD-10-CM

## 2019-11-27 DIAGNOSIS — D25.1 INTRAMURAL LEIOMYOMA OF UTERUS: ICD-10-CM

## 2019-11-27 PROCEDURE — 99213 OFFICE O/P EST LOW 20 MIN: CPT | Performed by: FAMILY MEDICINE

## 2019-11-27 NOTE — PROGRESS NOTES
Assessment/Plan:         Diagnoses and all orders for this visit:    Obstipation  Comments:  Advised continue all other bowel therapeutics thus far and add Magnesium citrate 1 bottle today  If no improvement, get KUB done  Orders:  -     XR abdomen 1 view kub; Future    Intramural leiomyoma of uterus          Subjective:      Patient ID: Pito Pena is a 52 y o  female  Acute visit for constipation  Three days of constipation, has been using Fibercon, Dulcolax and stool softener with small amount of stool  Stool is not hard  Woke with L sided abdominal pain  The following portions of the patient's history were reviewed and updated as appropriate: allergies, current medications, past family history, past medical history, past social history, past surgical history and problem list     Review of Systems   Constitutional: Negative for unexpected weight change  HENT: Negative  Respiratory: Negative  Cardiovascular: Negative  Gastrointestinal: Positive for abdominal distention, abdominal pain and constipation  Negative for blood in stool  Genitourinary: Negative  Musculoskeletal: Negative  All other systems reviewed and are negative  Objective:      /90   Pulse 89   Temp 98 8 °F (37 1 °C)   Ht 5' 5" (1 651 m)   Wt 81 2 kg (179 lb)   BMI 29 79 kg/m²          Physical Exam   Constitutional: She appears well-developed and well-nourished  Cardiovascular: Normal rate and regular rhythm  Pulmonary/Chest: Effort normal and breath sounds normal    Abdominal: Normal appearance and normal aorta  Bowel sounds are decreased  There is no hepatosplenomegaly  There is no tenderness  Palpable suprapubic mass consistent with fibroid uterus   Nursing note and vitals reviewed

## 2019-11-29 ENCOUNTER — HOSPITAL ENCOUNTER (OUTPATIENT)
Dept: RADIOLOGY | Facility: HOSPITAL | Age: 49
Discharge: HOME | End: 2019-11-29
Payer: COMMERCIAL

## 2019-11-29 DIAGNOSIS — K59.00 OBSTIPATION: ICD-10-CM

## 2019-11-29 DIAGNOSIS — K59.00 OBSTIPATION: Primary | ICD-10-CM

## 2019-11-29 PROCEDURE — 74018 RADEX ABDOMEN 1 VIEW: CPT

## 2019-12-04 ENCOUNTER — TELEPHONE (OUTPATIENT)
Dept: FAMILY MEDICINE CLINIC | Facility: HOSPITAL | Age: 49
End: 2019-12-04

## 2019-12-04 NOTE — TELEPHONE ENCOUNTER
Patient took the linzess for 5 days and feels that it is working for her  She is back to work and her normal routine and doing better gi-wise  She likes the linzess and how it works, but the expense would likely keep her from using it longer term  She would like to take the linzess for an additional 5 days to make sure she stays back on track  Then she would like to try to back to her OTC meds she was taking previously      She wants to know if you are ok with this plan or is you have something else you would like her to do     pcb - ok to leave message at either number

## 2019-12-04 NOTE — TELEPHONE ENCOUNTER
Yes this sounds like a good plan    She should be able to keep ahead of constipation once all "cleaned out"

## 2019-12-04 NOTE — TELEPHONE ENCOUNTER
Pt saw Dr Leon Grimm last week  He started her on Linzess  She started and on the 5th day it is working well  She does have a 30 day supply  She will go back to her OTC meds, is this OK? Her daily routine  PCB: [phone] cell  [phone] work

## 2019-12-09 NOTE — TELEPHONE ENCOUNTER
Pt stopped taking the Linzess on Friday  Since has been having more regular bowel movements, but is still only having 1/day  Feels the urge to go later in the day, but cannot go and is uncomfortable  Takes Fibercon and stool softener @ night - should she be taking something during the day, or should she continue the [address] [place] [address] for a bit longer?

## 2019-12-09 NOTE — TELEPHONE ENCOUNTER
I would encourage continuing Linzess maybe M-W-F of this week to complete the clean out process  Then could go PRN

## 2019-12-21 ENCOUNTER — HOSPITAL ENCOUNTER (OUTPATIENT)
Dept: ULTRASOUND IMAGING | Facility: HOSPITAL | Age: 49
Discharge: HOME | End: 2019-12-21
Attending: OBSTETRICS & GYNECOLOGY
Payer: COMMERCIAL

## 2019-12-21 DIAGNOSIS — D21.9 FIBROIDS: ICD-10-CM

## 2019-12-21 PROCEDURE — 76830 TRANSVAGINAL US NON-OB: CPT

## 2019-12-21 PROCEDURE — 76856 US EXAM PELVIC COMPLETE: CPT

## 2019-12-23 ENCOUNTER — OFFICE VISIT (OUTPATIENT)
Dept: URGENT CARE | Facility: CLINIC | Age: 49
End: 2019-12-23
Payer: COMMERCIAL

## 2019-12-23 VITALS
DIASTOLIC BLOOD PRESSURE: 81 MMHG | SYSTOLIC BLOOD PRESSURE: 167 MMHG | BODY MASS INDEX: 31.24 KG/M2 | WEIGHT: 183 LBS | RESPIRATION RATE: 16 BRPM | TEMPERATURE: 99.3 F | HEART RATE: 93 BPM | HEIGHT: 64 IN

## 2019-12-23 DIAGNOSIS — K59.00 CONSTIPATION, UNSPECIFIED CONSTIPATION TYPE: Primary | ICD-10-CM

## 2019-12-23 PROCEDURE — 99213 OFFICE O/P EST LOW 20 MIN: CPT | Performed by: PHYSICIAN ASSISTANT

## 2019-12-24 NOTE — PATIENT INSTRUCTIONS
Begin taking Mirlax to help to soft stool   Continue with plenty of water   Follow up with PCP in 3-5 days symptoms do not improve  Proceed to the ER with fever, chills, severe abdominal pain     Constipation   WHAT YOU NEED TO KNOW:   Constipation is when you have hard, dry bowel movements, or you go longer than usual between bowel movements  DISCHARGE INSTRUCTIONS:   Return to the emergency department if:   · You have blood in your bowel movements  · You have a fever and abdominal pain with the constipation  Contact your healthcare provider if:   · Your constipation gets worse  · You start to vomit  · You have questions or concerns about your condition or care  Medicines:   · Medicine or a fiber supplement  may help make your bowel movement softer  A laxative may help relax and loosen your intestines to help you have a bowel movement  You may also be given medicine to increase fluid in your intestines  The fluid may help move bowel movements through your intestines  · Take your medicine as directed  Contact your healthcare provider if you think your medicine is not helping or if you have side effects  Tell him of her if you are allergic to any medicine  Keep a list of the medicines, vitamins, and herbs you take  Include the amounts, and when and why you take them  Bring the list or the pill bottles to follow-up visits  Carry your medicine list with you in case of an emergency  Manage your constipation:   · Drink liquids as directed  You may need to drink extra liquids to help soften and move your bowels  Ask how much liquid to drink each day and which liquids are best for you  · Eat high-fiber foods  This may help decrease constipation by adding bulk to your bowel movements  High-fiber foods include fruit, vegetables, whole-grain breads and cereals, and beans  Your healthcare provider or dietitian can help you create a high-fiber meal plan  · Exercise regularly    Regular physical activity can help stimulate your intestines  Ask which exercises are best for you  · Schedule a time each day to have a bowel movement  This may help train your body to have regular bowel movements  Bend forward while you are on the toilet to help move the bowel movement out  Sit on the toilet for at least 10 minutes, even if you do not have a bowel movement  Follow up with your healthcare provider as directed:  Write down your questions so you remember to ask them during your visits  © 2017 [facility] Information is for End User's use only and may not be sold, redistributed or otherwise used for commercial purposes  All illustrations and images included in CareNotes® are the copyrighted property of [employer] Inc  or [employer]ainer [employer]  The above information is an  only  It is not intended as medical advice for individual conditions or treatments  Talk to your doctor, nurse or pharmacist before following any medical regimen to see if it is safe and effective for you

## 2019-12-24 NOTE — PROGRESS NOTES
[facility]        NAME: Castro Strange is a 52 y o  female  : 1970    MRN: [id number]  DATE: 2019  TIME: 8:19 PM    Assessment and Plan   Constipation, unspecified constipation type [K59 00]  1  Constipation, unspecified constipation type           Patient Instructions     Begin taking Mirlax to help to soft stool   Continue with plenty of water   Follow up with PCP in 3-5 days symptoms do not improve  Proceed to the ER with fever, chills, severe abdominal pain       Chief Complaint     Chief Complaint   Patient presents with    Fever     Fever with chills and abdominal pain with gas  Pt has hx of IBS but states she is constipated  O2 is 98% RA         History of Present Illness       44-year-old female with past medical:  History of chronic constipation an IBS, prevent for lower left abdominal pain and eat decreased stool bulk x2 days  The patient states that she is eating and drinking normally  She denies seeing any blood in either the urine or the stool  She denies nausea, vomiting  She states that her "fevers" are most likely due to stress as her stress has been increased around the holidays  Patient admits not drinking as much water recently  Abdominal Pain   This is a chronic (more than 10 years ) problem  The current episode started yesterday  The onset quality is gradual  The problem occurs intermittently  The problem has been unchanged  The pain is located in the LLQ  The pain is at a severity of 0/10 (Patient denies pain saying that it is more of a "uncomfortable feeling")  Associated symptoms include constipation (  Chronic, patient states that she is still going but that her bowel movements have been smaller than normal), a fever (Subjective fever was noted  T-max 99 0°) and flatus  Pertinent negatives include no anorexia, belching, diarrhea, dysuria, headaches, hematochezia, hematuria, melena, myalgias, nausea or vomiting  Nothing aggravates the pain   The pain is relieved by bowel movements  Treatments tried: Linzess x 1 day  The treatment provided mild relief  Her past medical history is significant for irritable bowel syndrome (IBS-C)  Review of Systems   Review of Systems   Constitutional: Positive for fever (Subjective fever was noted  T-max 99 0°)  Negative for appetite change, chills and fatigue  HENT: Negative  Eyes: Negative  Respiratory: Negative for chest tightness and shortness of breath  Cardiovascular: Negative for chest pain and palpitations  Gastrointestinal: Positive for abdominal distention (slightly), abdominal pain, constipation (  Chronic, patient states that she is still going but that her bowel movements have been smaller than normal) and flatus  Negative for anorexia, blood in stool, diarrhea, hematochezia, melena, nausea and vomiting  Genitourinary: Negative for difficulty urinating, dysuria, flank pain and hematuria  Musculoskeletal: Negative for myalgias  Skin: Negative for rash  Neurological: Negative for light-headedness and headaches           Current Medications       Current Outpatient Medications:     B Complex-Biotin-FA (VITAMIN B50 COMPLEX) TBCR, Take by mouth, Disp: , Rfl:     Calcium Carb-Cholecalciferol (CALCIUM + D3) 600-200 MG-UNIT TABS, Take by mouth, Disp: , Rfl:     linaCLOtide 145 MCG CAPS, Take 1 capsule (145 mcg total) by mouth daily, Disp: 30 capsule, Rfl: 0    Multiple Vitamin (MULTI-VITAMIN DAILY) TABS, Take by mouth, Disp: , Rfl:     norethindrone (MICRONOR) 0 35 MG tablet, Take 1 tablet (0 35 mg total) by mouth daily, Disp: 28 tablet, Rfl: 3    Current Allergies     Allergies as of 12/23/2019 - Reviewed 12/23/2019   Allergen Reaction Noted    Amoxicillin  04/21/2012    Clarithromycin  04/21/2012    Influenza vaccines  09/25/2013    Naproxen  03/26/2014            The following portions of the patient's history were reviewed and updated as appropriate: allergies, current medications, past family history, past medical history, past social history, past surgical history and problem list      Past Medical History:   Diagnosis Date    Anxiety disorder     Fibroid     Hirsutism     History of IBS     Polycystic ovarian syndrome        Past Surgical History:   Procedure Laterality Date    MOUTH SURGERY         Family History   Problem Relation Age of Onset    Arthritis Mother     Depression Mother     Hypertension Mother     Thyroid disease Mother     Crohn's disease Father     Diabetes Father     Heart disease Father     Hypertension Father     Cancer Family     No Known Problems Maternal Grandmother     No Known Problems Maternal Grandfather     No Known Problems Paternal Grandmother     No Known Problems Paternal Grandfather     Ovarian cancer Maternal Aunt          Medications have been verified  Objective   /81 (BP Location: Right arm, Patient Position: Sitting, Cuff Size: Standard)   Pulse 93   Temp 99 3 °F (37 4 °C) (Tympanic)   Resp 16   Ht 5' 4" (1 626 m)   Wt 83 kg (183 lb)   BMI 31 41 kg/m²        Physical Exam     Physical Exam   Constitutional: She appears well-developed and well-nourished  No distress  HENT:   Head: Normocephalic and atraumatic  Right Ear: External ear normal    Left Ear: External ear normal    Nose: No mucosal edema or rhinorrhea  No foreign bodies  Right sinus exhibits no maxillary sinus tenderness and no frontal sinus tenderness  Left sinus exhibits no maxillary sinus tenderness and no frontal sinus tenderness  Mouth/Throat: Uvula is midline, oropharynx is clear and moist and mucous membranes are normal  No uvula swelling or dental caries  No oropharyngeal exudate, posterior oropharyngeal edema, posterior oropharyngeal erythema or tonsillar abscesses  No tonsillar exudate  Eyes: Pupils are equal, round, and reactive to light  Conjunctivae, EOM and lids are normal  Right eye exhibits no discharge   Left eye exhibits no discharge  No scleral icterus  Cardiovascular: Normal rate, regular rhythm, S1 normal, S2 normal and normal heart sounds  Exam reveals no S3 and no S4  No murmur heard  Pulmonary/Chest: Effort normal and breath sounds normal  No stridor  No respiratory distress  She has no wheezes  She has no rhonchi  She has no rales  Abdominal: Soft  Normal appearance  She exhibits no distension  Bowel sounds are decreased  There is no hepatosplenomegaly  There is tenderness in the left lower quadrant  There is no rigidity, no rebound, no guarding, no CVA tenderness, no tenderness at McBurney's point and negative Ridley's sign  No hernia  Negative Rovsing's, iliopsoas   Lymphadenopathy:     She has no cervical adenopathy  Neurological: She is alert  Skin: Skin is warm and dry  No rash noted  She is not diaphoretic  No pallor  Psychiatric: She has a normal mood and affect

## 2019-12-26 ENCOUNTER — TELEPHONE (OUTPATIENT)
Dept: OBGYN CLINIC | Facility: CLINIC | Age: 49
End: 2019-12-26

## 2019-12-27 ENCOUNTER — OFFICE VISIT (OUTPATIENT)
Dept: FAMILY MEDICINE CLINIC | Facility: HOSPITAL | Age: 49
End: 2019-12-27
Payer: COMMERCIAL

## 2019-12-27 VITALS
HEART RATE: 83 BPM | WEIGHT: 178 LBS | BODY MASS INDEX: 30.39 KG/M2 | DIASTOLIC BLOOD PRESSURE: 80 MMHG | TEMPERATURE: 98.5 F | SYSTOLIC BLOOD PRESSURE: 148 MMHG | HEIGHT: 64 IN

## 2019-12-27 DIAGNOSIS — F41.9 ANXIETY: ICD-10-CM

## 2019-12-27 DIAGNOSIS — K59.00 CONSTIPATION, UNSPECIFIED CONSTIPATION TYPE: Primary | ICD-10-CM

## 2019-12-27 PROCEDURE — 1036F TOBACCO NON-USER: CPT | Performed by: INTERNAL MEDICINE

## 2019-12-27 PROCEDURE — 99214 OFFICE O/P EST MOD 30 MIN: CPT | Performed by: INTERNAL MEDICINE

## 2019-12-27 PROCEDURE — 3008F BODY MASS INDEX DOCD: CPT | Performed by: INTERNAL MEDICINE

## 2019-12-27 NOTE — TELEPHONE ENCOUNTER
The results are not available yet  She will have some changes in her cycle on the progesterone only pill  That is not unusual   If she takes it at the same time everyday, that will help decrease side effects

## 2019-12-27 NOTE — ASSESSMENT & PLAN NOTE
Acute on chronic constipation- tried MiraLax for first time last night and had a BM just prior to appt - feeling better, advised to con't MiraLax daily (hold for diarrhea) and to add Senna or Colace as needed, may con't Linzess as needed (qod works well for her), advised high fiber diet (hand out given) and plenty of fluids and regular walking, call with abd pain/V/F/blood in stool OR if not better

## 2019-12-27 NOTE — PROGRESS NOTES
Assessment/Plan:    Constipation  Acute on chronic constipation- tried MiraLax for first time last night and had a BM just prior to appt - feeling better, advised to con't MiraLax daily (hold for diarrhea) and to add Senna or Colace as needed, may con't Linzess as needed (qod works well for her), advised high fiber diet (hand out given) and plenty of fluids and regular walking, call with abd pain/V/F/blood in stool OR if not better    Anxiety  Notes stress with the holidays - is slowly improving but asking for Xanax, discussed SE of benzo use and pt deferring, appt with Vernestine Bene offered but pt deferring as she feels mood is slowly improving now Lan is over - will monitor off meds - call with worse mood/anxiety/panic attacks/depression/SI - pt verbally agreed to do so       Diagnoses and all orders for this visit:    Constipation, unspecified constipation type    Anxiety          Subjective:      Patient ID: Analia Barrera is a 52 y o  female  HPI Pt here with c/o constipation  She has had issues with constipation for some time  She has been using OTC stool softener and FiberCon  She was put on Linzess in Nov as well  She was doing well until last weekend (Sunday)  She thinks it was triggered by stress  She started taking the Linzess on Sun but then had diarrhea on Wed night so she stopped it  She then had some constipation and took a MiraLax last night and had a BM just before appt  She has had no abd pain but had the sensation of "a knot in my back"  She notes no F/C but temp was up at 99  She has had no blood in stool/black stools/V  She did have some nausea  She has tried to increase intake of water  She is not sure if her diet is high in fiber  Pt asking about going back on Xanax for her anxiety  She was more stressed d/t the holiday and financial stressors and "a couple of other issues"  Her mood has started to improve at this time    She had been on Xanax in the past and did well with it   She has seen a therapist in the past but is not sure if financially that is feasible  She has a regular f/u with Dr Hays Hodgkin in May  She notes the sensation of an anxiety attack but denies any SI  She did feel down a bit on Tuesday night as she could not make it to [place] Tue night  Review of Systems   Constitutional: Negative for chills, fever and unexpected weight change  Respiratory: Negative for cough and shortness of breath  Cardiovascular: Negative for chest pain and palpitations  Gastrointestinal: Positive for constipation and nausea  Negative for abdominal pain, blood in stool, diarrhea and vomiting  Musculoskeletal: Positive for back pain  Negative for neck pain  Skin: Negative for rash and wound  Neurological: Negative for dizziness and headaches  Psychiatric/Behavioral: Positive for dysphoric mood  Negative for behavioral problems, confusion and suicidal ideas  The patient is nervous/anxious  Objective:    /80 (BP Location: Right arm, Patient Position: Sitting, Cuff Size: Standard)   Pulse 83   Temp 98 5 °F (36 9 °C)   Ht 5' 4" (1 626 m)   Wt 80 7 kg (178 lb)   BMI 30 55 kg/m²      Physical Exam   Constitutional: She appears well-developed and well-nourished  No distress  HENT:   Head: Normocephalic and atraumatic  Eyes: Conjunctivae are normal  Right eye exhibits no discharge  Left eye exhibits no discharge  Neck: Neck supple  No tracheal deviation present  Cardiovascular: Normal rate, regular rhythm and normal heart sounds  Exam reveals no friction rub  No murmur heard  Pulmonary/Chest: Effort normal and breath sounds normal  No respiratory distress  She has no wheezes  She has no rales  Abdominal: Soft  She exhibits no distension  There is no tenderness  There is no rebound and no guarding  Musculoskeletal: She exhibits no edema  Neurological: She is alert  She exhibits normal muscle tone  Skin: Skin is warm and dry  No rash noted  Psychiatric: She has a normal mood and affect  Her behavior is normal    Nursing note and vitals reviewed

## 2019-12-31 NOTE — TELEPHONE ENCOUNTER
Patient called in today to let us know that her stress levels are down and she thinks her bowels are starting to move better than they were  She said she is having stomach pain right above the belly button, as well as lower back pain and buttocks pain since her bowels started moving better  She is taking Miralax regularly and asked if it was okay to take it daily  She is not taking linzess because she said it wasn't making much of a difference and thought that it was actually causing diarrhea  She is not taking colace or senna but if you think she needs to add a stool softener to her regimen she does have a generic brand at home  Please advise

## 2020-01-03 ENCOUNTER — TELEPHONE (OUTPATIENT)
Dept: OBGYN CLINIC | Facility: CLINIC | Age: 50
End: 2020-01-03

## 2020-01-03 DIAGNOSIS — D21.9 FIBROIDS: ICD-10-CM

## 2020-01-03 DIAGNOSIS — N83.201 CYST OF RIGHT OVARY: Primary | ICD-10-CM

## 2020-01-03 NOTE — TELEPHONE ENCOUNTER
----- Message from Kiara Riley DO sent at 12/30/2019 12:42 PM EST -----  US shows that her fibroids are stable but she has a right ovarian cyst and a repeat US in 3 months is recommended    thanks

## 2020-01-22 ENCOUNTER — TELEPHONE (OUTPATIENT)
Dept: FAMILY MEDICINE CLINIC | Facility: HOSPITAL | Age: 50
End: 2020-01-22

## 2020-01-22 DIAGNOSIS — F41.9 ANXIETY: Primary | ICD-10-CM

## 2020-01-22 RX ORDER — ALPRAZOLAM 0.25 MG/1
0.25 TABLET ORAL 3 TIMES DAILY PRN
Qty: 30 TABLET | Refills: 0 | Status: SHIPPED | OUTPATIENT
Start: 2020-01-22 | End: 2021-02-10 | Stop reason: SDUPTHER

## 2020-01-22 NOTE — TELEPHONE ENCOUNTER
I sent Alprazolam Rx  It is not unusual to get back spasm perhaps from straining    Apply heating pad or Thermacare patches to the area

## 2020-01-22 NOTE — TELEPHONE ENCOUNTER
Pt had a flare up of ibs last week into this week  She is getting a knot in the center of low back, is this a cause for concern? Possibly from straining?     She is also wondering if she could be rx'd xanax for anxiety

## 2020-01-29 ENCOUNTER — OFFICE VISIT (OUTPATIENT)
Dept: FAMILY MEDICINE CLINIC | Facility: HOSPITAL | Age: 50
End: 2020-01-29
Payer: COMMERCIAL

## 2020-01-29 VITALS
TEMPERATURE: 98.1 F | HEIGHT: 64 IN | BODY MASS INDEX: 31.48 KG/M2 | DIASTOLIC BLOOD PRESSURE: 88 MMHG | WEIGHT: 184.4 LBS | HEART RATE: 87 BPM | SYSTOLIC BLOOD PRESSURE: 142 MMHG | OXYGEN SATURATION: 100 %

## 2020-01-29 DIAGNOSIS — K59.00 OBSTIPATION: Primary | ICD-10-CM

## 2020-01-29 PROCEDURE — 1036F TOBACCO NON-USER: CPT | Performed by: FAMILY MEDICINE

## 2020-01-29 PROCEDURE — 99213 OFFICE O/P EST LOW 20 MIN: CPT | Performed by: FAMILY MEDICINE

## 2020-01-29 PROCEDURE — 3008F BODY MASS INDEX DOCD: CPT | Performed by: FAMILY MEDICINE

## 2020-01-30 NOTE — PROGRESS NOTES
Assessment/Plan:         Diagnoses and all orders for this visit:    Obstipation  -     CT abdomen pelvis w contrast; Future    Other orders  -     b complex vitamins tablet; Take 1 tablet by mouth daily          Subjective:      Patient ID: Meghan Meneses is a 52 y o  female  F/U constipation    Still having daily problem with a productive stool  Using daily FiberCon with benefit in the morning    Sense of incomplete elimination  Used Miralax also with some benefit  Increased water intake  The following portions of the patient's history were reviewed and updated as appropriate: allergies, current medications, past family history, past medical history, past social history, past surgical history and problem list     Review of Systems   Constitutional: Negative for unexpected weight change  Respiratory: Negative  Cardiovascular: Negative  Gastrointestinal: Positive for abdominal distention, abdominal pain and constipation  Negative for diarrhea and nausea  Genitourinary: Negative  Musculoskeletal: Positive for back pain  Neurological: Negative  Hematological: Negative  Psychiatric/Behavioral: The patient is nervous/anxious  All other systems reviewed and are negative  Objective:      /88   Pulse 87   Temp 98 1 °F (36 7 °C) (Tympanic)   Ht 5' 4" (1 626 m)   Wt 83 6 kg (184 lb 6 4 oz)   SpO2 100%   BMI 31 65 kg/m²          Physical Exam   Constitutional: She appears well-developed and well-nourished  Pulmonary/Chest: Effort normal and breath sounds normal    Abdominal: Bowel sounds are increased  There is no hepatosplenomegaly  There is generalized tenderness  There is no rebound  Nursing note and vitals reviewed

## 2020-02-09 ENCOUNTER — HOSPITAL ENCOUNTER (OUTPATIENT)
Dept: CT IMAGING | Facility: HOSPITAL | Age: 50
Discharge: HOME | End: 2020-02-09
Payer: COMMERCIAL

## 2020-02-09 DIAGNOSIS — K59.00 OBSTIPATION: ICD-10-CM

## 2020-02-09 PROCEDURE — 74177 CT ABD & PELVIS W/CONTRAST: CPT

## 2020-02-09 RX ADMIN — IOHEXOL 100 ML: 350 INJECTION, SOLUTION INTRAVENOUS at 10:20

## 2020-02-24 DIAGNOSIS — E28.2 PCO (POLYCYSTIC OVARIES): ICD-10-CM

## 2020-02-24 RX ORDER — ACETAMINOPHEN AND CODEINE PHOSPHATE 120; 12 MG/5ML; MG/5ML
1 SOLUTION ORAL DAILY
Qty: 28 TABLET | Refills: 4 | Status: SHIPPED | OUTPATIENT
Start: 2020-02-24 | End: 2020-08-20 | Stop reason: SDUPTHER

## 2020-05-18 ENCOUNTER — OFFICE VISIT (OUTPATIENT)
Dept: FAMILY MEDICINE CLINIC | Facility: HOSPITAL | Age: 50
End: 2020-05-18
Payer: COMMERCIAL

## 2020-05-18 VITALS
HEIGHT: 64 IN | TEMPERATURE: 98.7 F | HEART RATE: 87 BPM | SYSTOLIC BLOOD PRESSURE: 138 MMHG | DIASTOLIC BLOOD PRESSURE: 80 MMHG | WEIGHT: 183.4 LBS | BODY MASS INDEX: 31.31 KG/M2

## 2020-05-18 DIAGNOSIS — K59.01 SLOW TRANSIT CONSTIPATION: ICD-10-CM

## 2020-05-18 DIAGNOSIS — E66.09 CLASS 1 OBESITY DUE TO EXCESS CALORIES WITHOUT SERIOUS COMORBIDITY WITH BODY MASS INDEX (BMI) OF 31.0 TO 31.9 IN ADULT: ICD-10-CM

## 2020-05-18 DIAGNOSIS — E04.1 NONTOXIC SINGLE THYROID NODULE: ICD-10-CM

## 2020-05-18 DIAGNOSIS — R03.0 ELEVATED BLOOD PRESSURE READING IN OFFICE WITHOUT DIAGNOSIS OF HYPERTENSION: Primary | ICD-10-CM

## 2020-05-18 DIAGNOSIS — F41.9 ANXIETY: ICD-10-CM

## 2020-05-18 DIAGNOSIS — Z00.00 LABORATORY EXAM ORDERED AS PART OF ROUTINE GENERAL MEDICAL EXAMINATION: ICD-10-CM

## 2020-05-18 PROCEDURE — 3008F BODY MASS INDEX DOCD: CPT | Performed by: FAMILY MEDICINE

## 2020-05-18 PROCEDURE — 99214 OFFICE O/P EST MOD 30 MIN: CPT | Performed by: FAMILY MEDICINE

## 2020-05-18 PROCEDURE — 1036F TOBACCO NON-USER: CPT | Performed by: FAMILY MEDICINE

## 2020-05-19 ENCOUNTER — TELEPHONE (OUTPATIENT)
Dept: FAMILY MEDICINE CLINIC | Facility: HOSPITAL | Age: 50
End: 2020-05-19

## 2020-07-11 ENCOUNTER — HOSPITAL ENCOUNTER (OUTPATIENT)
Dept: ULTRASOUND IMAGING | Facility: HOSPITAL | Age: 50
Discharge: HOME | End: 2020-07-11
Attending: OBSTETRICS & GYNECOLOGY
Payer: COMMERCIAL

## 2020-07-11 DIAGNOSIS — D21.9 FIBROIDS: ICD-10-CM

## 2020-07-11 DIAGNOSIS — N83.201 CYST OF RIGHT OVARY: ICD-10-CM

## 2020-07-11 PROCEDURE — 76856 US EXAM PELVIC COMPLETE: CPT

## 2020-07-11 PROCEDURE — 76830 TRANSVAGINAL US NON-OB: CPT

## 2020-07-28 ENCOUNTER — OFFICE VISIT (OUTPATIENT)
Dept: FAMILY MEDICINE CLINIC | Facility: HOSPITAL | Age: 50
End: 2020-07-28
Payer: COMMERCIAL

## 2020-07-28 VITALS
HEART RATE: 90 BPM | TEMPERATURE: 98.9 F | BODY MASS INDEX: 31.04 KG/M2 | HEIGHT: 64 IN | OXYGEN SATURATION: 98 % | SYSTOLIC BLOOD PRESSURE: 136 MMHG | WEIGHT: 181.8 LBS | DIASTOLIC BLOOD PRESSURE: 78 MMHG

## 2020-07-28 DIAGNOSIS — M25.531 RIGHT WRIST PAIN: Primary | ICD-10-CM

## 2020-07-28 PROCEDURE — 99213 OFFICE O/P EST LOW 20 MIN: CPT | Performed by: NURSE PRACTITIONER

## 2020-07-28 PROCEDURE — 3008F BODY MASS INDEX DOCD: CPT | Performed by: NURSE PRACTITIONER

## 2020-07-28 PROCEDURE — 1036F TOBACCO NON-USER: CPT | Performed by: NURSE PRACTITIONER

## 2020-07-28 NOTE — PROGRESS NOTES
Assessment/Plan:   Right wrist discomfort likely tendonitis  Reassured pt that her veins appear normal  No sign of a clot or phlebitis  Can take OTC NSAIDs for pain  Ice and can use wrist splint or ace wrap to help stabilize wrist       Diagnoses and all orders for this visit:    Right wrist pain          Subjective:     Patient ID: Christelle De Anda is a 52 y o  female  Right hand vein was popped up and now with lump  On and off pain in wrist area and top of arm  Left handed  Works as   A lot of typing and filing  No N/T in hand  No swelling or redness of wrist        Review of Systems   Musculoskeletal: Positive for arthralgias (right wrist pain)  Negative for joint swelling  The following portions of the patient's history were reviewed and updated as appropriate: allergies, current medications, past family history, past medical history, past social history, past surgical history and problem list     Objective:  Vitals:    07/28/20 1933   BP: 136/78   Pulse: 90   Temp: 98 9 °F (37 2 °C)   SpO2: 98%      Physical Exam   Constitutional: She is oriented to person, place, and time  She appears well-developed and well-nourished  Cardiovascular: Normal rate and regular rhythm  Pulses:       Radial pulses are 2+ on the left side  Right hand and lower arm veins appear normal  No phlebitis  Pulmonary/Chest: Effort normal and breath sounds normal    Musculoskeletal:        Right wrist: Normal    Neurological: She is alert and oriented to person, place, and time  Skin: Skin is warm and dry  Capillary refill takes less than 2 seconds  Psychiatric: She has a normal mood and affect   Her behavior is normal  Judgment and thought content normal

## 2020-08-10 ENCOUNTER — TELEPHONE (OUTPATIENT)
Dept: FAMILY MEDICINE CLINIC | Facility: HOSPITAL | Age: 50
End: 2020-08-10

## 2020-08-10 NOTE — TELEPHONE ENCOUNTER
----- Message from Evorn Bunting Adriana Goltz sent at 8/10/2020 10:51 AM EDT -----  Regarding: Non-Urgent Medical Question  Contact: [phone]  I tried to call today but either got a busy signal or "the call cannot be completed as dialed"  I need to obtain a referral to [facility] for an issue with my toes/toenails  My appt is scheduled for tomorrow 8/11 at 11:30 a m  Their NPI # is [id number]  I can be reached at [phone]  I will keep trying to call

## 2020-08-20 ENCOUNTER — ANNUAL EXAM (OUTPATIENT)
Dept: OBGYN CLINIC | Facility: CLINIC | Age: 50
End: 2020-08-20
Payer: COMMERCIAL

## 2020-08-20 VITALS
SYSTOLIC BLOOD PRESSURE: 130 MMHG | BODY MASS INDEX: 30.9 KG/M2 | TEMPERATURE: 98.4 F | WEIGHT: 181 LBS | HEIGHT: 64 IN | DIASTOLIC BLOOD PRESSURE: 70 MMHG

## 2020-08-20 DIAGNOSIS — Z01.419 ENCOUNTER FOR ANNUAL ROUTINE GYNECOLOGICAL EXAMINATION: Primary | ICD-10-CM

## 2020-08-20 DIAGNOSIS — E28.2 PCO (POLYCYSTIC OVARIES): ICD-10-CM

## 2020-08-20 DIAGNOSIS — D21.9 FIBROIDS: ICD-10-CM

## 2020-08-20 DIAGNOSIS — Z12.31 ENCOUNTER FOR SCREENING MAMMOGRAM FOR BREAST CANCER: ICD-10-CM

## 2020-08-20 PROCEDURE — S0612 ANNUAL GYNECOLOGICAL EXAMINA: HCPCS | Performed by: OBSTETRICS & GYNECOLOGY

## 2020-08-20 PROCEDURE — 3008F BODY MASS INDEX DOCD: CPT | Performed by: OBSTETRICS & GYNECOLOGY

## 2020-08-20 PROCEDURE — 3008F BODY MASS INDEX DOCD: CPT | Performed by: NURSE PRACTITIONER

## 2020-08-20 RX ORDER — ACETAMINOPHEN AND CODEINE PHOSPHATE 120; 12 MG/5ML; MG/5ML
1 SOLUTION ORAL DAILY
Qty: 28 TABLET | Refills: 14 | Status: SHIPPED | OUTPATIENT
Start: 2020-08-20 | End: 2021-12-08 | Stop reason: ALTCHOICE

## 2020-08-20 NOTE — PROGRESS NOTES
Assessment/Plan:    pap is up to date    mammogram reviewed with her including breast density  RX given     Discussed self breast exams    Fibroids have been stable-ultrasound ordered for next year, she will have this done    PCO-we discussed continuing her progesterone only pills verses cycling herself on Aygestin which might give her more cycle control or stopping her pills altogether  She will continue the progesterone only pills and then we will discuss this next year at her visit  discussed preventive care, regular exercise and a healthy diet      No problem-specific Assessment & Plan notes found for this encounter  Diagnoses and all orders for this visit:    Encounter for annual routine gynecological examination    Encounter for screening mammogram for breast cancer  -     Mammo screening bilateral w 3d & cad; Future    Fibroids  -     US pelvis complete w transvaginal; Future    PCO (polycystic ovaries)  -     norethindrone (MICRONOR) 0 35 MG tablet; Take 1 tablet (0 35 mg total) by mouth daily          Subjective:      Patient ID: Pool Edwards is a 52 y o  female  Patient here for yearly  She had been on combination oral contraceptives for many years for polycystic ovarian syndrome  Last year her blood pressure was noted to be elevated and we switched her over to progesterone only pills  She does not get her cycle at the same time every month  She bleeds for 6 days, two heavy days  She also has several fibroids that have been stable  Most recent US was last month  Normal Pap and negative HPV in 2018  Normal 3D mammogram in September last year that showed scattered fibroglandular densities and an average risk  The following portions of the patient's history were reviewed and updated as appropriate: allergies, current medications, past family history, past medical history, past social history, past surgical history and problem list     Review of Systems   Constitutional: Negative  Gastrointestinal: Negative  Genitourinary: Negative  Objective: There were no vitals taken for this visit  Physical Exam  Vitals signs reviewed  Constitutional:       Appearance: She is well-developed  Neck:      Thyroid: No thyromegaly  Trachea: No tracheal deviation  Cardiovascular:      Rate and Rhythm: Normal rate and regular rhythm  Pulmonary:      Effort: Pulmonary effort is normal       Breath sounds: Normal breath sounds  Chest:      Breasts: Breasts are symmetrical          Right: No inverted nipple, mass, nipple discharge, skin change or tenderness  Left: No inverted nipple, mass, nipple discharge, skin change or tenderness  Abdominal:      General: There is no distension  Palpations: Abdomen is soft  There is no mass  Tenderness: There is no abdominal tenderness  Genitourinary:     Labia:         Right: No rash, tenderness, lesion or injury  Left: No rash, tenderness, lesion or injury  Vagina: Normal       Cervix: No cervical motion tenderness, discharge or friability  Adnexa:         Right: No mass, tenderness or fullness  Left: No mass, tenderness or fullness          Rectum: Normal       Comments: 14 week size uterus, small right fibroid palpated, uterus very mobile

## 2020-10-03 ENCOUNTER — HOSPITAL ENCOUNTER (OUTPATIENT)
Dept: MAMMOGRAPHY | Facility: CLINIC | Age: 50
Discharge: HOME | End: 2020-10-03
Payer: COMMERCIAL

## 2020-10-03 VITALS — WEIGHT: 176 LBS | HEIGHT: 66 IN | BODY MASS INDEX: 28.28 KG/M2

## 2020-10-03 DIAGNOSIS — Z12.31 VISIT FOR SCREENING MAMMOGRAM: ICD-10-CM

## 2020-10-03 DIAGNOSIS — Z12.31 ENCOUNTER FOR SCREENING MAMMOGRAM FOR BREAST CANCER: ICD-10-CM

## 2020-10-03 PROCEDURE — 77067 SCR MAMMO BI INCL CAD: CPT

## 2020-10-03 PROCEDURE — 77063 BREAST TOMOSYNTHESIS BI: CPT

## 2020-11-08 LAB
ALBUMIN SERPL-MCNC: 4.3 G/DL (ref 3.8–4.8)
ALBUMIN/GLOB SERPL: 1.8 {RATIO} (ref 1.2–2.2)
ALP SERPL-CCNC: 61 IU/L (ref 39–117)
ALT SERPL-CCNC: 23 IU/L (ref 0–32)
AST SERPL-CCNC: 17 IU/L (ref 0–40)
BASOPHILS # BLD AUTO: 0 X10E3/UL (ref 0–0.2)
BASOPHILS NFR BLD AUTO: 1 %
BILIRUB SERPL-MCNC: 0.4 MG/DL (ref 0–1.2)
BUN SERPL-MCNC: 12 MG/DL (ref 6–24)
BUN/CREAT SERPL: 18 (ref 9–23)
CALCIUM SERPL-MCNC: 9.3 MG/DL (ref 8.7–10.2)
CHLORIDE SERPL-SCNC: 104 MMOL/L (ref 96–106)
CHOLEST SERPL-MCNC: 171 MG/DL (ref 100–199)
CO2 SERPL-SCNC: 24 MMOL/L (ref 20–29)
CREAT SERPL-MCNC: 0.65 MG/DL (ref 0.57–1)
EOSINOPHIL # BLD AUTO: 0.1 X10E3/UL (ref 0–0.4)
EOSINOPHIL NFR BLD AUTO: 2 %
ERYTHROCYTE [DISTWIDTH] IN BLOOD BY AUTOMATED COUNT: 11.4 % (ref 11.7–15.4)
EST. AVERAGE GLUCOSE BLD GHB EST-MCNC: 103 MG/DL
GLOBULIN SER-MCNC: 2.4 G/DL (ref 1.5–4.5)
GLUCOSE SERPL-MCNC: 85 MG/DL (ref 65–99)
HBA1C MFR BLD: 5.2 % (ref 4.8–5.6)
HCT VFR BLD AUTO: 40.1 % (ref 34–46.6)
HDLC SERPL-MCNC: 41 MG/DL
HGB BLD-MCNC: 13.7 G/DL (ref 11.1–15.9)
IMM GRANULOCYTES # BLD: 0 X10E3/UL (ref 0–0.1)
IMM GRANULOCYTES NFR BLD: 0 %
LDLC SERPL CALC-MCNC: 105 MG/DL (ref 0–99)
LDLC/HDLC SERPL: 2.6 RATIO (ref 0–3.2)
LYMPHOCYTES # BLD AUTO: 2.1 X10E3/UL (ref 0.7–3.1)
LYMPHOCYTES NFR BLD AUTO: 39 %
MCH RBC QN AUTO: 30.4 PG (ref 26.6–33)
MCHC RBC AUTO-ENTMCNC: 34.2 G/DL (ref 31.5–35.7)
MCV RBC AUTO: 89 FL (ref 79–97)
MONOCYTES # BLD AUTO: 0.4 X10E3/UL (ref 0.1–0.9)
MONOCYTES NFR BLD AUTO: 7 %
NEUTROPHILS # BLD AUTO: 2.9 X10E3/UL (ref 1.4–7)
NEUTROPHILS NFR BLD AUTO: 51 %
PLATELET # BLD AUTO: 376 X10E3/UL (ref 150–450)
POTASSIUM SERPL-SCNC: 4.2 MMOL/L (ref 3.5–5.2)
PROT SERPL-MCNC: 6.7 G/DL (ref 6–8.5)
RBC # BLD AUTO: 4.5 X10E6/UL (ref 3.77–5.28)
SL AMB EGFR AFRICAN AMERICAN: 120 ML/MIN/1.73
SL AMB EGFR NON AFRICAN AMERICAN: 104 ML/MIN/1.73
SL AMB VLDL CHOLESTEROL CALC: 25 MG/DL (ref 5–40)
SODIUM SERPL-SCNC: 139 MMOL/L (ref 134–144)
TRIGL SERPL-MCNC: 140 MG/DL (ref 0–149)
TSH SERPL DL<=0.005 MIU/L-ACNC: 3.2 UIU/ML (ref 0.45–4.5)
WBC # BLD AUTO: 5.5 X10E3/UL (ref 3.4–10.8)

## 2020-11-18 ENCOUNTER — OFFICE VISIT (OUTPATIENT)
Dept: FAMILY MEDICINE CLINIC | Facility: HOSPITAL | Age: 50
End: 2020-11-18
Payer: COMMERCIAL

## 2020-11-18 VITALS
WEIGHT: 185.8 LBS | HEIGHT: 66 IN | BODY MASS INDEX: 29.86 KG/M2 | DIASTOLIC BLOOD PRESSURE: 98 MMHG | HEART RATE: 76 BPM | SYSTOLIC BLOOD PRESSURE: 154 MMHG | TEMPERATURE: 96.4 F | OXYGEN SATURATION: 99 %

## 2020-11-18 DIAGNOSIS — R01.1 SYSTOLIC MURMUR: ICD-10-CM

## 2020-11-18 DIAGNOSIS — R03.0 ELEVATED BLOOD PRESSURE READING IN OFFICE WITHOUT DIAGNOSIS OF HYPERTENSION: Primary | ICD-10-CM

## 2020-11-18 DIAGNOSIS — E04.1 NONTOXIC SINGLE THYROID NODULE: ICD-10-CM

## 2020-11-18 DIAGNOSIS — F41.9 ANXIETY: ICD-10-CM

## 2020-11-18 DIAGNOSIS — E28.2 POLYCYSTIC OVARIAN SYNDROME: ICD-10-CM

## 2020-11-18 PROCEDURE — 3008F BODY MASS INDEX DOCD: CPT | Performed by: FAMILY MEDICINE

## 2020-11-18 PROCEDURE — 99214 OFFICE O/P EST MOD 30 MIN: CPT | Performed by: FAMILY MEDICINE

## 2020-11-18 PROCEDURE — 1036F TOBACCO NON-USER: CPT | Performed by: FAMILY MEDICINE

## 2020-12-04 ENCOUNTER — HOSPITAL ENCOUNTER (OUTPATIENT)
Dept: NON INVASIVE DIAGNOSTICS | Facility: CLINIC | Age: 50
Discharge: HOME | End: 2020-12-04
Payer: COMMERCIAL

## 2020-12-04 DIAGNOSIS — R01.1 SYSTOLIC MURMUR: ICD-10-CM

## 2020-12-04 PROCEDURE — 93306 TTE W/DOPPLER COMPLETE: CPT | Performed by: INTERNAL MEDICINE

## 2020-12-04 PROCEDURE — 93306 TTE W/DOPPLER COMPLETE: CPT

## 2021-02-10 DIAGNOSIS — F41.9 ANXIETY: ICD-10-CM

## 2021-02-10 RX ORDER — ALPRAZOLAM 0.25 MG/1
0.25 TABLET ORAL 3 TIMES DAILY PRN
Qty: 30 TABLET | Refills: 0 | Status: SHIPPED | OUTPATIENT
Start: 2021-02-10 | End: 2021-12-08 | Stop reason: SDUPTHER

## 2021-03-30 DIAGNOSIS — Z23 ENCOUNTER FOR IMMUNIZATION: ICD-10-CM

## 2021-04-08 ENCOUNTER — IMMUNIZATIONS (OUTPATIENT)
Dept: FAMILY MEDICINE CLINIC | Facility: HOSPITAL | Age: 51
End: 2021-04-08

## 2021-04-08 DIAGNOSIS — Z23 ENCOUNTER FOR IMMUNIZATION: Primary | ICD-10-CM

## 2021-04-08 PROCEDURE — 91300 SARS-COV-2 / COVID-19 MRNA VACCINE (PFIZER-BIONTECH) 30 MCG: CPT

## 2021-04-08 PROCEDURE — 0001A SARS-COV-2 / COVID-19 MRNA VACCINE (PFIZER-BIONTECH) 30 MCG: CPT

## 2021-04-12 ENCOUNTER — TELEPHONE (OUTPATIENT)
Dept: FAMILY MEDICINE CLINIC | Facility: HOSPITAL | Age: 51
End: 2021-04-12

## 2021-04-12 NOTE — TELEPHONE ENCOUNTER
----- Message from Flora Mcmillan MD sent at 4/12/2021  6:09 PM EDT -----  Regarding: FW: Non-Urgent Medical Question  Contact: [phone]  It probably is related to the shot  It can flare areas in the body that may have had some inflammation  Not a permanent issue- OK to use Motrin or Aleve if that is a usual med for her   ----- Message -----  From: Efren Penaloza MA  Sent: 4/12/2021   2:50 PM EDT  To: Flora Mcmillan MD  Subject: FW: Non-Urgent Medical Question                    ----- Message -----  From: Reema Gan  Sent: 4/12/2021   2:01 PM EDT  To: Flora Mcmillan Md Clinical  Subject: Non-Urgent Medical Question                      I got my 1st Covid 19 vaccine ([place]) last Thursday  I did have a sore arm and felt crappy for a day or so  The other thing i have is knee pain and aching  This has been pretty much since I got the shot  Is this just an unusual side effect? Or a coincidence? I've knee issues in the past but I wasn't having any issues before this

## 2021-04-14 RX ORDER — NORGESTIMATE AND ETHINYL ESTRADIOL 7DAYSX3 LO
KIT ORAL
Qty: 28 TABLET | Refills: 13 | OUTPATIENT
Start: 2021-04-14

## 2021-04-16 ENCOUNTER — TELEPHONE (OUTPATIENT)
Dept: FAMILY MEDICINE CLINIC | Facility: HOSPITAL | Age: 51
End: 2021-04-16

## 2021-04-16 DIAGNOSIS — M25.562 CHRONIC PAIN OF BOTH KNEES: Primary | ICD-10-CM

## 2021-04-16 DIAGNOSIS — M25.561 CHRONIC PAIN OF BOTH KNEES: Primary | ICD-10-CM

## 2021-04-16 DIAGNOSIS — G89.29 CHRONIC PAIN OF BOTH KNEES: Primary | ICD-10-CM

## 2021-04-16 NOTE — TELEPHONE ENCOUNTER
----- Message from Nicole Mosqueda sent at 4/16/2021 12:32 PM EDT -----  Regarding: RE: Non-Urgent Medical Question  Contact: [phone]  Thank you  I will probably do so tomorrow

## 2021-04-17 ENCOUNTER — HOSPITAL ENCOUNTER (OUTPATIENT)
Dept: RADIOLOGY | Facility: HOSPITAL | Age: 51
Discharge: HOME | End: 2021-04-17
Payer: COMMERCIAL

## 2021-04-17 DIAGNOSIS — G89.29 CHRONIC PAIN OF BOTH KNEES: ICD-10-CM

## 2021-04-17 DIAGNOSIS — M25.562 CHRONIC PAIN OF BOTH KNEES: ICD-10-CM

## 2021-04-17 DIAGNOSIS — M25.561 CHRONIC PAIN OF BOTH KNEES: ICD-10-CM

## 2021-04-17 PROCEDURE — 73562 X-RAY EXAM OF KNEE 3: CPT

## 2021-05-01 ENCOUNTER — IMMUNIZATIONS (OUTPATIENT)
Dept: FAMILY MEDICINE CLINIC | Facility: HOSPITAL | Age: 51
End: 2021-05-01

## 2021-05-01 DIAGNOSIS — Z23 ENCOUNTER FOR IMMUNIZATION: Primary | ICD-10-CM

## 2021-05-01 PROCEDURE — 0002A SARS-COV-2 / COVID-19 MRNA VACCINE (PFIZER-BIONTECH) 30 MCG: CPT

## 2021-05-01 PROCEDURE — 91300 SARS-COV-2 / COVID-19 MRNA VACCINE (PFIZER-BIONTECH) 30 MCG: CPT

## 2021-05-26 ENCOUNTER — OFFICE VISIT (OUTPATIENT)
Dept: FAMILY MEDICINE CLINIC | Facility: HOSPITAL | Age: 51
End: 2021-05-26
Payer: COMMERCIAL

## 2021-05-26 VITALS
BODY MASS INDEX: 28.45 KG/M2 | OXYGEN SATURATION: 98 % | DIASTOLIC BLOOD PRESSURE: 82 MMHG | WEIGHT: 177 LBS | TEMPERATURE: 97.3 F | HEART RATE: 79 BPM | HEIGHT: 66 IN | SYSTOLIC BLOOD PRESSURE: 126 MMHG

## 2021-05-26 DIAGNOSIS — Z00.00 LABORATORY EXAM ORDERED AS PART OF ROUTINE GENERAL MEDICAL EXAMINATION: ICD-10-CM

## 2021-05-26 DIAGNOSIS — R03.0 ELEVATED BLOOD PRESSURE READING IN OFFICE WITHOUT DIAGNOSIS OF HYPERTENSION: Primary | ICD-10-CM

## 2021-05-26 DIAGNOSIS — K59.00 CONSTIPATION, UNSPECIFIED CONSTIPATION TYPE: ICD-10-CM

## 2021-05-26 DIAGNOSIS — Z12.11 SCREEN FOR COLON CANCER: ICD-10-CM

## 2021-05-26 DIAGNOSIS — F41.9 ANXIETY: ICD-10-CM

## 2021-05-26 PROCEDURE — 3008F BODY MASS INDEX DOCD: CPT | Performed by: FAMILY MEDICINE

## 2021-05-26 PROCEDURE — 99214 OFFICE O/P EST MOD 30 MIN: CPT | Performed by: FAMILY MEDICINE

## 2021-05-26 PROCEDURE — 3725F SCREEN DEPRESSION PERFORMED: CPT | Performed by: FAMILY MEDICINE

## 2021-05-26 PROCEDURE — 1036F TOBACCO NON-USER: CPT | Performed by: FAMILY MEDICINE

## 2021-05-26 NOTE — PROGRESS NOTES
Assessment/Plan:         Diagnoses and all orders for this visit:    Elevated blood pressure reading in office without diagnosis of hypertension  Comments:  Stable without medication    Constipation, unspecified constipation type  Comments:  Able to manage with diet    BMI 28 0-28 9,adult    Anxiety  Comments:  Minimal occasaional use of Alprazolam    Laboratory exam ordered as part of routine general medical examination  -     CBC and Platelet; Future  -     Comprehensive metabolic panel; Future  -     HEMOGLOBIN A1C W/ EAG ESTIMATION; Future  -     Lipid Panel with Direct LDL reflex; Future  -     TSH, 3rd generation; Future  -     CBC and Platelet  -     Comprehensive metabolic panel  -     HEMOGLOBIN A1C W/ EAG ESTIMATION  -     Lipid Panel with Direct LDL reflex  -     TSH, 3rd generation    Screen for colon cancer  -     Ambulatory referral to Gastroenterology; Future          Subjective:      Patient ID: Reema Gan is a 48 y o  female  6 month follow up  Feeling well overall    Had second COVID and noted some ringing in ears, terry when in a quiet room  No headaches beyond stress headaches  Brother had severe COVID episode and has slow recovery  The following portions of the patient's history were reviewed and updated as appropriate: allergies, current medications, past family history, past medical history, past social history, past surgical history and problem list     Review of Systems   Constitutional: Positive for unexpected weight change  Negative for fatigue  HENT: Negative  Eyes: Negative for visual disturbance  Respiratory: Negative  Cardiovascular: Negative  Gastrointestinal: Negative  Genitourinary: Negative  Musculoskeletal: Negative  Neurological: Negative  Hematological: Negative  Psychiatric/Behavioral: Negative  All other systems reviewed and are negative          Objective:      /82 (Patient Position: Sitting, Cuff Size: Large)   Pulse 79 Temp (!) 97 3 °F (36 3 °C) (Tympanic)   Ht 5' 6" (1 676 m)   Wt 80 3 kg (177 lb)   SpO2 98%   BMI 28 57 kg/m²          Physical Exam  Vitals signs and nursing note reviewed  Constitutional:       Appearance: Normal appearance  HENT:      Head: Normocephalic and atraumatic  Cardiovascular:      Rate and Rhythm: Normal rate and regular rhythm  Pulses: Normal pulses  Heart sounds: Normal heart sounds  Pulmonary:      Effort: Pulmonary effort is normal       Breath sounds: Normal breath sounds  Musculoskeletal:      Right lower leg: No edema  Left lower leg: No edema  Skin:     Findings: No rash  Neurological:      General: No focal deficit present  Mental Status: She is alert and oriented to person, place, and time  Psychiatric:         Mood and Affect: Mood normal          Behavior: Behavior normal          Thought Content:  Thought content normal          Judgment: Judgment normal

## 2021-07-02 VITALS — BODY MASS INDEX: 28.45 KG/M2 | HEIGHT: 66 IN | WEIGHT: 177 LBS

## 2021-07-02 DIAGNOSIS — Z12.11 SCREENING FOR COLON CANCER: Primary | ICD-10-CM

## 2021-07-02 RX ORDER — SODIUM PICOSULFATE, MAGNESIUM OXIDE, AND ANHYDROUS CITRIC ACID 10; 3.5; 12 MG/160ML; G/160ML; G/160ML
LIQUID ORAL
Qty: 320 ML | Refills: 0 | Status: SHIPPED | OUTPATIENT
Start: 2021-07-02 | End: 2021-07-16 | Stop reason: HOSPADM

## 2021-07-02 NOTE — TELEPHONE ENCOUNTER
Why does your doctor want you to have this procedure? Screening    Do you have kidney disease?  no  If yes, are you on dialysis :     Have you had diverticulitis within the past 2 months? no    Are you diabetic?  no  If yes, insulin dependent: If yes, provide diabetic instructions sheet     Do take iron supplements?  no  If yes, instruct patient to hold iron supplement for 7 days prior    Are you on a blood thinner? no   Was the blood thinner sheet complete and faxed to cardiologist no  Plavix (clopidogrel), Coumadin (warfarin), Lovenox (enoxaparin), Xarelto (rivaroxaban), Pradaxa(dabigatran), Eliquis(apixaban) Savaysa/Lixiana (edoxapan)    Do you have an automatic implantable cardiac defibrillator (AICD)/pacemaker ([facility])? no  Was AICD/pacemaker sheet completed and faxed to cardiologist? no    Are you on home oxygen? no  If yes, continuous or nocturnal:     Have you been treated for MRSA, VRE or any communicable diseases? no    Heart attack, stroke, or stent within 3 months? no  Schedule at Hospital if within 3-6 months   Use nitroglycerin for chest pain in the last 6 months? no    History of organ  transplant?  no   If yes, notify Endo      History of neck/throat/tongue surgery or cancer? no  IF yes, notify Endo      Any problems with anesthesia in the past? no     Was stool C diff ordered?  no Stool specimen needs to be completed prior to procedure    Do have any facial or body piercings?no     Do you have a latex allergy? no     Do have an allergy to metals? ([facility]ravo study only) no     If pediatric patient, was consent faxed to pediatrician no     Patient rights reviewed yes    Rx Clenpiq sent to provider for signature  Instructions emailed to patient

## 2021-07-12 ENCOUNTER — TELEPHONE (OUTPATIENT)
Dept: GASTROENTEROLOGY | Facility: CLINIC | Age: 51
End: 2021-07-12

## 2021-07-12 ENCOUNTER — TELEPHONE (OUTPATIENT)
Dept: FAMILY MEDICINE CLINIC | Facility: HOSPITAL | Age: 51
End: 2021-07-12

## 2021-07-12 NOTE — TELEPHONE ENCOUNTER
Pt lvm want's to change prep to Gatorade/Miralax, clenpiq not covered  Asked to have it sent through [address]  New instructions sent  She had a few other questions so I told her to give us a call back to discuss

## 2021-07-16 ENCOUNTER — ANESTHESIA EVENT (OUTPATIENT)
Dept: GASTROENTEROLOGY | Facility: AMBULATORY SURGERY CENTER | Age: 51
End: 2021-07-16

## 2021-07-16 ENCOUNTER — ANESTHESIA (OUTPATIENT)
Dept: GASTROENTEROLOGY | Facility: AMBULATORY SURGERY CENTER | Age: 51
End: 2021-07-16

## 2021-07-16 ENCOUNTER — HOSPITAL ENCOUNTER (OUTPATIENT)
Dept: GASTROENTEROLOGY | Facility: AMBULATORY SURGERY CENTER | Age: 51
Discharge: HOME | End: 2021-07-16
Payer: COMMERCIAL

## 2021-07-16 VITALS
DIASTOLIC BLOOD PRESSURE: 63 MMHG | SYSTOLIC BLOOD PRESSURE: 119 MMHG | TEMPERATURE: 98 F | OXYGEN SATURATION: 99 % | HEART RATE: 68 BPM | RESPIRATION RATE: 19 BRPM

## 2021-07-16 DIAGNOSIS — Z12.11 SCREENING FOR COLON CANCER: ICD-10-CM

## 2021-07-16 LAB
EXT PREGNANCY TEST URINE: NEGATIVE
EXT. CONTROL: NORMAL

## 2021-07-16 PROCEDURE — G0121 COLON CA SCRN NOT HI RSK IND: HCPCS | Performed by: INTERNAL MEDICINE

## 2021-07-16 RX ORDER — SODIUM CHLORIDE, SODIUM LACTATE, POTASSIUM CHLORIDE, CALCIUM CHLORIDE 600; 310; 30; 20 MG/100ML; MG/100ML; MG/100ML; MG/100ML
50 INJECTION, SOLUTION INTRAVENOUS CONTINUOUS
Status: DISCONTINUED | OUTPATIENT
Start: 2021-07-16 | End: 2021-07-20 | Stop reason: HOSPADM

## 2021-07-16 RX ORDER — SODIUM CHLORIDE, SODIUM LACTATE, POTASSIUM CHLORIDE, CALCIUM CHLORIDE 600; 310; 30; 20 MG/100ML; MG/100ML; MG/100ML; MG/100ML
INJECTION, SOLUTION INTRAVENOUS CONTINUOUS PRN
Status: DISCONTINUED | OUTPATIENT
Start: 2021-07-16 | End: 2021-07-16

## 2021-07-16 RX ORDER — PROPOFOL 10 MG/ML
INJECTION, EMULSION INTRAVENOUS AS NEEDED
Status: DISCONTINUED | OUTPATIENT
Start: 2021-07-16 | End: 2021-07-16

## 2021-07-16 RX ADMIN — PROPOFOL 50 MG: 10 INJECTION, EMULSION INTRAVENOUS at 10:54

## 2021-07-16 RX ADMIN — SODIUM CHLORIDE, SODIUM LACTATE, POTASSIUM CHLORIDE, CALCIUM CHLORIDE 50 ML/HR: 600; 310; 30; 20 INJECTION, SOLUTION INTRAVENOUS at 10:06

## 2021-07-16 RX ADMIN — PROPOFOL 50 MG: 10 INJECTION, EMULSION INTRAVENOUS at 11:00

## 2021-07-16 RX ADMIN — SODIUM CHLORIDE, SODIUM LACTATE, POTASSIUM CHLORIDE, CALCIUM CHLORIDE: 600; 310; 30; 20 INJECTION, SOLUTION INTRAVENOUS at 10:49

## 2021-07-16 RX ADMIN — PROPOFOL 100 MG: 10 INJECTION, EMULSION INTRAVENOUS at 10:51

## 2021-07-16 RX ADMIN — PROPOFOL 50 MG: 10 INJECTION, EMULSION INTRAVENOUS at 10:58

## 2021-07-16 NOTE — H&P
History and Physical - [facility]  Leslie Tobin 48 y o  female MRN: [id number]    HPI: Leslie Tobin is a 48y o  year old female who presents for screening colonoscopy    REVIEW OF SYSTEMS: Per the HPI, and otherwise unremarkable      Historical Information   Past Medical History:   Diagnosis Date    Anxiety     Anxiety disorder     Fibroid     Hirsutism     History of IBS     Irritable bowel syndrome     Polycystic ovarian syndrome      Past Surgical History:   Procedure Laterality Date    EGD      MOUTH SURGERY      NO PAST SURGERIES       Social History   Social History     Substance and Sexual Activity   Alcohol Use Yes    Comment: rarely     Social History     Substance and Sexual Activity   Drug Use No     Social History     Tobacco Use   Smoking Status Never Smoker   Smokeless Tobacco Never Used     Family History   Problem Relation Age of Onset    Arthritis Mother     Depression Mother     Hypertension Mother     Thyroid disease Mother     Crohn's disease Father     Diabetes Father     Heart disease Father     Hypertension Father     Cancer Family     No Known Problems Maternal Grandmother     No Known Problems Maternal Grandfather     No Known Problems Paternal Grandmother     No Known Problems Paternal Grandfather     Ovarian cancer Maternal Aunt     Breast cancer Cousin     Colon cancer Neg Hx        Meds/Allergies       Current Outpatient Medications:     ALPRAZolam (XANAX) 0 25 mg tablet    b complex vitamins tablet    Calcium Carb-Cholecalciferol (CALCIUM + D3) 600-200 MG-UNIT TABS    Multiple Vitamin (MULTI-VITAMIN DAILY) TABS    norethindrone (MICRONOR) 0 35 MG tablet    Sod Picosulfate-Mag Ox-Cit Acd (Clenpiq) 10-3 5-12 MG-GM -GM/160ML SOLN    Current Facility-Administered Medications:     lactated ringers infusion, 50 mL/hr, Intravenous, Continuous, 50 mL/hr at 07/16/21 1006    Allergies   Allergen Reactions    Amoxicillin      Other reaction(s): GI Upset  Reaction Date: 12Sep2011;     Clarithromycin      Reaction Date: 12Sep2011;     Influenza Vaccines     Naproxen      Other reaction(s): GI Upset       Objective     /69   Pulse 77   Temp 98 °F (36 7 °C) (Temporal)   Resp 18   SpO2 97%     PHYSICAL EXAM    Gen: NAD AAOx3  Head: Normocephalic, Atraumatic  CV: S1S2 RRR no m/r/g  CHEST: Clear b/l no c/r/w  ABD: soft, +BS NT/ND  EXT: no edema    ASSESSMENT/PLAN:  This is a 48y o  year old female here for colonoscopy, and she is stable and optimized for her procedure

## 2021-07-16 NOTE — ANESTHESIA PREPROCEDURE EVALUATION
Procedure:  COLONOSCOPY    Relevant Problems   ANESTHESIA (within normal limits)      CARDIO (within normal limits)      GYN   (-) Currently pregnant      NEURO/PSYCH   (+) Anxiety      PULMONARY   (-) Sleep apnea   (-) Smoking   (-) URI (upper respiratory infection)      Physical Exam    Airway    Mallampati score: III  TM Distance: >3 FB  Neck ROM: full     Dental   No notable dental hx     Cardiovascular      Pulmonary      Other Findings         Lab Results   Component Value Date    WBC 5 5 11/07/2020    HGB 13 7 11/07/2020     11/07/2020     Lab Results   Component Value Date    SODIUM 139 11/07/2020    K 4 2 11/07/2020    BUN 12 11/07/2020    CREATININE 0 65 11/07/2020    GLUCOSE 89 11/11/2017     Lab Results   Component Value Date    HGBA1C 5 2 11/07/2020         Anesthesia Plan  ASA Score- 1     Anesthesia Type- IV sedation with anesthesia with ASA Monitors  Additional Monitors:   Airway Plan:           Plan Factors-Exercise tolerance (METS): >4 METS  Chart reviewed  Existing labs reviewed  Patient summary reviewed  Patient is not a current smoker  Induction- intravenous  Postoperative Plan-     Informed Consent- Anesthetic plan and risks discussed with patient  I personally reviewed this patient with the CRNA  Discussed and agreed on the Anesthesia Plan with the CRNA  [address]

## 2021-08-07 ENCOUNTER — HOSPITAL ENCOUNTER (OUTPATIENT)
Dept: ULTRASOUND IMAGING | Facility: HOSPITAL | Age: 51
Discharge: HOME | End: 2021-08-07
Attending: OBSTETRICS & GYNECOLOGY
Payer: COMMERCIAL

## 2021-08-07 DIAGNOSIS — D21.9 FIBROIDS: ICD-10-CM

## 2021-08-07 PROCEDURE — 76856 US EXAM PELVIC COMPLETE: CPT

## 2021-08-07 PROCEDURE — 76830 TRANSVAGINAL US NON-OB: CPT

## 2021-09-02 ENCOUNTER — ANNUAL EXAM (OUTPATIENT)
Dept: OBGYN CLINIC | Facility: CLINIC | Age: 51
End: 2021-09-02
Payer: COMMERCIAL

## 2021-09-02 VITALS
HEIGHT: 65 IN | SYSTOLIC BLOOD PRESSURE: 130 MMHG | DIASTOLIC BLOOD PRESSURE: 80 MMHG | WEIGHT: 178.2 LBS | BODY MASS INDEX: 29.69 KG/M2

## 2021-09-02 DIAGNOSIS — D21.9 FIBROIDS: ICD-10-CM

## 2021-09-02 DIAGNOSIS — Z01.419 ENCOUNTER FOR ANNUAL ROUTINE GYNECOLOGICAL EXAMINATION: Primary | ICD-10-CM

## 2021-09-02 DIAGNOSIS — Z12.31 ENCOUNTER FOR SCREENING MAMMOGRAM FOR BREAST CANCER: ICD-10-CM

## 2021-09-02 PROCEDURE — 3008F BODY MASS INDEX DOCD: CPT | Performed by: OBSTETRICS & GYNECOLOGY

## 2021-09-02 PROCEDURE — 1036F TOBACCO NON-USER: CPT | Performed by: OBSTETRICS & GYNECOLOGY

## 2021-09-02 PROCEDURE — 99396 PREV VISIT EST AGE 40-64: CPT | Performed by: OBSTETRICS & GYNECOLOGY

## 2021-09-02 NOTE — PROGRESS NOTES
Assessment/Plan:    pap is up to date  - will do Pap and HPV next year      History of polycystic ovarian syndrome- she will stop the progesterone only pills  She will then carefully observe her menstrual cycles  She will be close to perimenopause if she is not already in perimenopause and I would prefer to remove the progesterone in case this is stimulating the fibroids  mammogram reviewed with her including breast density  RX given for next month    Discussed self breast exams    Fibroids -   Enlarged fibroid on ultrasound  On exam, her uterus seems stable  It is difficult to compare her fibroids according to the radiologist due to the amount of them  Reviewing her past several ultrasounds, the largest fibroids have been measured at slightly different measurements although not as large as this year  I recommended that she stop her progesterone only pills and we will recheck the ultrasound in 3-4 months  We can determine if the fibroids are getting larger, at that point I would recommend hysterectomy  I suspect that they will be stable or possibly slightly smaller  She had some questions regarding uterine artery embolization because the radiologist mentioned this in the report and I explained this to her  The fibroids are relatively asymptomatic, she will occasionally have some "twinges" usually prior to the start of her menstrual cycle  We discussed the possibility of an MRI if necessary to ensure that these appear to be typical, benign fibroids  She would prefer to be conservative if possible  colon cancer screening - colonoscopy done in July, recall in 10 years  discussed preventive care, regular exercise and a healthy diet      No problem-specific Assessment & Plan notes found for this encounter         Diagnoses and all orders for this visit:    Encounter for annual routine gynecological examination    Encounter for screening mammogram for breast cancer  -     Mammo screening bilateral w 3d & cad; Future    Fibroids  -     US pelvis complete w transvaginal; Future          Subjective:      Patient ID: Andrew Todd is a 48 y o  female  Patient here for yearly  She is on progesterone only pills due to history of irregular cycles and polycystic ovarian syndrome  She had been on combination pills however she was switched to progesterone only due to elevated BP  Her bleeding is irregular on the progesterone only pills, sometimes she skips a cycle and sometimes she bleeds twice a month  She is not sexually active  She denies pain or pressure  She sometimes will have a "twinge" prior to her menstrual cycle  She also has a history of fibroids that were noted on a pelvic exam in 2018  They had been stable, but this year she recently had her ultrasound last month and 1 fibroid was significantly larger than last year  The radiologist states that it is difficult to compare the fibroids due to the amount of them however last year the largest was 5 7 x 6 3 x 5 1 and this year the largest fibroid was 7 6 x 5 3 x 9 1 cm  Overall, her uterus measures slightly smaller this year  Both ovaries are normal and her endometrium is thin  Normal Pap and negative HPV in 2018  Normal 3D mammogram in October showed scattered fibroglandular densities and average risk  The following portions of the patient's history were reviewed and updated as appropriate: allergies, current medications, past family history, past medical history, past social history, past surgical history and problem list     Review of Systems   Constitutional: Negative  Gastrointestinal: Negative  Genitourinary: Negative  Objective:      /80 (BP Location: Left arm, Patient Position: Sitting, Cuff Size: Standard)   Ht 5' 5" (1 651 m)   Wt 80 8 kg (178 lb 3 2 oz)   LMP 07/29/2021 (Exact Date)   BMI 29 65 kg/m²          Physical Exam  Vitals reviewed     Constitutional:       Appearance: She is well-developed  Neck:      Thyroid: No thyromegaly  Trachea: No tracheal deviation  Cardiovascular:      Rate and Rhythm: Normal rate and regular rhythm  Pulmonary:      Effort: Pulmonary effort is normal       Breath sounds: Normal breath sounds  Chest:      Breasts: Breasts are symmetrical          Right: No inverted nipple, mass, nipple discharge, skin change or tenderness  Left: No inverted nipple, mass, nipple discharge, skin change or tenderness  Abdominal:      General: There is no distension  Palpations: Abdomen is soft  There is no mass  Tenderness: There is no abdominal tenderness  Genitourinary:     Labia:         Right: No rash, tenderness, lesion or injury  Left: No rash, tenderness, lesion or injury  Vagina: Normal       Cervix: No cervical motion tenderness, discharge or friability  Adnexa:         Right: No mass, tenderness or fullness  Left: No mass, tenderness or fullness  Rectum: Normal       Comments:  12-14 week mobile uterus  Left-sided fullness noted and right-sided /posterior fibroid noted  Uterus seems more irregular on the right side    Nontender

## 2021-10-09 ENCOUNTER — HOSPITAL ENCOUNTER (OUTPATIENT)
Dept: MAMMOGRAPHY | Facility: CLINIC | Age: 51
Discharge: HOME | End: 2021-10-09
Payer: COMMERCIAL

## 2021-10-09 VITALS — HEIGHT: 65 IN | WEIGHT: 178 LBS | BODY MASS INDEX: 29.66 KG/M2

## 2021-10-09 DIAGNOSIS — Z12.31 VISIT FOR SCREENING MAMMOGRAM: ICD-10-CM

## 2021-10-09 DIAGNOSIS — Z12.31 ENCOUNTER FOR SCREENING MAMMOGRAM FOR BREAST CANCER: ICD-10-CM

## 2021-10-09 PROCEDURE — 77063 BREAST TOMOSYNTHESIS BI: CPT

## 2021-10-09 PROCEDURE — 77067 SCR MAMMO BI INCL CAD: CPT

## 2021-10-12 NOTE — PROGRESS NOTES
Assessment/Plan:    pap is up to date    mammogram reviewed with her including breast density  Discussed indications for 3D mammogram, RX given for 3D and she will check on coverage  Discussed self breast exams    colon cancer screening - discussed the guidelines    Elevated BP - I recommended stopping the combination pill as this may be aggravating her BP  We discussed stopping the pills and monitoring her cycles, or switching to progesterone only pills  She would prefer to do this as she is worried about irregular cycles  She just started a new pack and will stop this and start the progesterone only pills  She will return in 3 months fo ra pill check  Fibroids - stable, we will recheck them and the simple cyst at the end of the year  discussed preventive care, regular exercise and a healthy diet      No problem-specific Assessment & Plan notes found for this encounter  Diagnoses and all orders for this visit:    Encounter for screening mammogram for malignant neoplasm of breast  -     Mammo screening bilateral w 3d & cad; Future    Encounter for annual routine gynecological examination    Encounter for screening mammogram for breast cancer    Fibroids  -     US pelvis complete w transvaginal; Future    Encounter for surveillance of contraceptive pills    PCO (polycystic ovaries)  -     norethindrone (MICRONOR) 0 35 MG tablet; Take 1 tablet (0 35 mg total) by mouth daily    Other orders  -     Cancel: Mammo screening bilateral w 3d & cad; Future          Subjective:      Patient ID: Dontae Jimenez is a 50 y o  female  Pt here for yearly  She has been on ortho tri cyclen lo for PCO  Her BP is mildly elevated today and she has had some other elevated BP in the past year  Her fibroids are stable and so is her simple  Ovarian cyst   She has no gyn complaints      Normal pap and negative HPV in 2018  Mammogram from August 2018 showed scattered fibroglandular densities and an average risk    She is interested in a 3D mammogram       The following portions of the patient's history were reviewed and updated as appropriate: allergies, current medications, past family history, past medical history, past social history, past surgical history and problem list     Review of Systems   Constitutional: Negative  HENT: Negative  Eyes: Negative  Respiratory: Negative  Cardiovascular: Negative  Gastrointestinal: Negative  Endocrine: Negative  Genitourinary: Negative  Musculoskeletal: Negative  Skin: Negative  Allergic/Immunologic: Negative  Neurological: Negative  Hematological: Negative  Psychiatric/Behavioral: Negative  Objective:      /90 (BP Location: Right arm, Patient Position: Sitting, Cuff Size: Standard)   Ht 5' 5 5" (1 664 m)   Wt 79 8 kg (176 lb)   LMP 07/11/2019 (Exact Date)   BMI 28 84 kg/m²          Physical Exam   Constitutional: She appears well-developed  Neck: No tracheal deviation present  No thyromegaly present  Cardiovascular: Normal rate and regular rhythm  Pulmonary/Chest: Effort normal and breath sounds normal  Right breast exhibits no inverted nipple, no mass, no nipple discharge, no skin change and no tenderness  Left breast exhibits no inverted nipple, no mass, no nipple discharge, no skin change and no tenderness  Breasts are symmetrical    Examined seated and supine   Abdominal: Soft  She exhibits no distension and no mass  There is no tenderness  Genitourinary: Rectum normal, vagina normal and uterus normal  No labial fusion  There is no rash, tenderness, lesion or injury on the right labia  There is no rash, tenderness, lesion or injury on the left labia  Cervix exhibits no motion tenderness, no discharge and no friability  Right adnexum displays no mass, no tenderness and no fullness  Left adnexum displays no mass, no tenderness and no fullness     Genitourinary Comments: Uterus irregular, can palpate left fundal fibroid and right posterior fibroid, unchanged   Vitals reviewed  Partial Purse String (Intermediate) Text: Given the location of the defect and the characteristics of the surrounding skin an intermediate purse string closure was deemed most appropriate.  Undermining was performed circumfirentially around the surgical defect.  A purse string suture was then placed and tightened. Wound tension only allowed a partial closure of the circular defect.

## 2021-10-21 ENCOUNTER — TELEPHONE (OUTPATIENT)
Dept: FAMILY MEDICINE CLINIC | Facility: HOSPITAL | Age: 51
End: 2021-10-21

## 2021-11-06 ENCOUNTER — HOSPITAL ENCOUNTER (OUTPATIENT)
Dept: ULTRASOUND IMAGING | Facility: HOSPITAL | Age: 51
Discharge: HOME | End: 2021-11-06
Attending: OBSTETRICS & GYNECOLOGY
Payer: COMMERCIAL

## 2021-11-06 DIAGNOSIS — D21.9 FIBROIDS: ICD-10-CM

## 2021-11-06 PROCEDURE — 76830 TRANSVAGINAL US NON-OB: CPT

## 2021-11-06 PROCEDURE — 76856 US EXAM PELVIC COMPLETE: CPT

## 2021-11-12 ENCOUNTER — TELEPHONE (OUTPATIENT)
Dept: OBGYN CLINIC | Facility: CLINIC | Age: 51
End: 2021-11-12

## 2021-11-27 LAB
ALBUMIN SERPL-MCNC: 4.6 G/DL (ref 3.8–4.9)
ALBUMIN/GLOB SERPL: 1.6 {RATIO} (ref 1.2–2.2)
ALP SERPL-CCNC: 62 IU/L (ref 44–121)
ALT SERPL-CCNC: 27 IU/L (ref 0–32)
AST SERPL-CCNC: 21 IU/L (ref 0–40)
BILIRUB SERPL-MCNC: 0.2 MG/DL (ref 0–1.2)
BUN SERPL-MCNC: 12 MG/DL (ref 6–24)
BUN/CREAT SERPL: 18 (ref 9–23)
CALCIUM SERPL-MCNC: 9.8 MG/DL (ref 8.7–10.2)
CHLORIDE SERPL-SCNC: 99 MMOL/L (ref 96–106)
CHOLEST SERPL-MCNC: 201 MG/DL (ref 100–199)
CO2 SERPL-SCNC: 24 MMOL/L (ref 20–29)
CREAT SERPL-MCNC: 0.68 MG/DL (ref 0.57–1)
ERYTHROCYTE [DISTWIDTH] IN BLOOD BY AUTOMATED COUNT: 11.2 % (ref 11.7–15.4)
EST. AVERAGE GLUCOSE BLD GHB EST-MCNC: 108 MG/DL
GLOBULIN SER-MCNC: 2.9 G/DL (ref 1.5–4.5)
GLUCOSE SERPL-MCNC: 90 MG/DL (ref 65–99)
HBA1C MFR BLD: 5.4 % (ref 4.8–5.6)
HCT VFR BLD AUTO: 42.2 % (ref 34–46.6)
HDLC SERPL-MCNC: 48 MG/DL
HGB BLD-MCNC: 14.2 G/DL (ref 11.1–15.9)
LDLC SERPL CALC-MCNC: 124 MG/DL (ref 0–99)
LDLC/HDLC SERPL: 2.6 RATIO (ref 0–3.2)
MCH RBC QN AUTO: 29.8 PG (ref 26.6–33)
MCHC RBC AUTO-ENTMCNC: 33.6 G/DL (ref 31.5–35.7)
MCV RBC AUTO: 89 FL (ref 79–97)
PLATELET # BLD AUTO: 426 X10E3/UL (ref 150–450)
POTASSIUM SERPL-SCNC: 4 MMOL/L (ref 3.5–5.2)
PROT SERPL-MCNC: 7.5 G/DL (ref 6–8.5)
RBC # BLD AUTO: 4.77 X10E6/UL (ref 3.77–5.28)
SL AMB EGFR AFRICAN AMERICAN: 117 ML/MIN/1.73
SL AMB EGFR NON AFRICAN AMERICAN: 102 ML/MIN/1.73
SL AMB VLDL CHOLESTEROL CALC: 29 MG/DL (ref 5–40)
SODIUM SERPL-SCNC: 138 MMOL/L (ref 134–144)
TRIGL SERPL-MCNC: 166 MG/DL (ref 0–149)
TSH SERPL DL<=0.005 MIU/L-ACNC: 2.98 UIU/ML (ref 0.45–4.5)
WBC # BLD AUTO: 6 X10E3/UL (ref 3.4–10.8)

## 2021-12-08 ENCOUNTER — OFFICE VISIT (OUTPATIENT)
Dept: FAMILY MEDICINE CLINIC | Facility: HOSPITAL | Age: 51
End: 2021-12-08
Payer: COMMERCIAL

## 2021-12-08 VITALS
HEIGHT: 65 IN | WEIGHT: 181.8 LBS | OXYGEN SATURATION: 99 % | BODY MASS INDEX: 30.29 KG/M2 | SYSTOLIC BLOOD PRESSURE: 136 MMHG | DIASTOLIC BLOOD PRESSURE: 88 MMHG | TEMPERATURE: 97.6 F | HEART RATE: 82 BPM

## 2021-12-08 DIAGNOSIS — E04.1 NONTOXIC SINGLE THYROID NODULE: ICD-10-CM

## 2021-12-08 DIAGNOSIS — R03.0 ELEVATED BLOOD PRESSURE READING IN OFFICE WITHOUT DIAGNOSIS OF HYPERTENSION: Primary | ICD-10-CM

## 2021-12-08 DIAGNOSIS — D25.9 UTERINE LEIOMYOMA, UNSPECIFIED LOCATION: ICD-10-CM

## 2021-12-08 DIAGNOSIS — F41.9 ANXIETY: ICD-10-CM

## 2021-12-08 PROCEDURE — 99214 OFFICE O/P EST MOD 30 MIN: CPT | Performed by: FAMILY MEDICINE

## 2021-12-08 RX ORDER — ALPRAZOLAM 0.5 MG/1
0.5 TABLET ORAL 3 TIMES DAILY PRN
Qty: 30 TABLET | Refills: 1 | Status: SHIPPED | OUTPATIENT
Start: 2021-12-08

## 2021-12-09 ENCOUNTER — PROCEDURE VISIT (OUTPATIENT)
Dept: OBGYN CLINIC | Facility: CLINIC | Age: 51
End: 2021-12-09
Payer: COMMERCIAL

## 2021-12-09 VITALS
WEIGHT: 178.8 LBS | SYSTOLIC BLOOD PRESSURE: 150 MMHG | HEIGHT: 65 IN | DIASTOLIC BLOOD PRESSURE: 90 MMHG | BODY MASS INDEX: 29.79 KG/M2

## 2021-12-09 DIAGNOSIS — N92.6 IRREGULAR BLEEDING: Primary | ICD-10-CM

## 2021-12-09 DIAGNOSIS — D21.9 FIBROIDS: ICD-10-CM

## 2021-12-09 PROCEDURE — 1036F TOBACCO NON-USER: CPT | Performed by: OBSTETRICS & GYNECOLOGY

## 2021-12-09 PROCEDURE — 81025 URINE PREGNANCY TEST: CPT | Performed by: OBSTETRICS & GYNECOLOGY

## 2021-12-09 PROCEDURE — 3008F BODY MASS INDEX DOCD: CPT | Performed by: OBSTETRICS & GYNECOLOGY

## 2021-12-09 PROCEDURE — 99213 OFFICE O/P EST LOW 20 MIN: CPT | Performed by: OBSTETRICS & GYNECOLOGY

## 2021-12-10 PROCEDURE — 58100 BIOPSY OF UTERUS LINING: CPT | Performed by: OBSTETRICS & GYNECOLOGY

## 2021-12-16 LAB
PATH REPORT.SITE OF ORIGIN SPEC: NORMAL
PAYMENT PROCEDURE: NORMAL
SL AMB .: NORMAL

## 2021-12-26 ENCOUNTER — NURSE TRIAGE (OUTPATIENT)
Dept: OTHER | Facility: OTHER | Age: 51
End: 2021-12-26

## 2021-12-26 DIAGNOSIS — Z11.59 SPECIAL SCREENING EXAMINATION FOR VIRAL DISEASE: Primary | ICD-10-CM

## 2021-12-26 PROCEDURE — 87636 SARSCOV2 & INF A&B AMP PRB: CPT | Performed by: FAMILY MEDICINE

## 2021-12-27 LAB
FLUAV RNA RESP QL NAA+PROBE: NEGATIVE
FLUBV RNA RESP QL NAA+PROBE: NEGATIVE
SARS-COV-2 RNA RESP QL NAA+PROBE: NEGATIVE

## 2021-12-29 ENCOUNTER — TELEPHONE (OUTPATIENT)
Dept: FAMILY MEDICINE CLINIC | Facility: HOSPITAL | Age: 51
End: 2021-12-29

## 2022-02-25 ENCOUNTER — TELEPHONE (OUTPATIENT)
Dept: OBGYN CLINIC | Facility: CLINIC | Age: 52
End: 2022-02-25

## 2022-02-25 NOTE — TELEPHONE ENCOUNTER
Patient called to report had EMB in December  Skipped cycle in January  LMP 2/17/22 and still bleeding, not heavy  Offered Progesterone as suggested by Dr Kat Harrison in December  Patient prefers to wait a few more days  Advised take Ibuprofen, increase fluids, increase rest, and observe over weekend  Call back next week if still bleeding to discuss cycling with Progesterone

## 2022-02-25 NOTE — TELEPHONE ENCOUNTER
Yes, if it does not stop, we can give her a course of progesterone  She can observe for now    thanks

## 2022-05-10 ENCOUNTER — TELEPHONE (OUTPATIENT)
Dept: FAMILY MEDICINE CLINIC | Facility: HOSPITAL | Age: 52
End: 2022-05-10

## 2022-05-10 NOTE — TELEPHONE ENCOUNTER
PT AWARE TO KEEP A WATCHFUL EYE FOR SYMPTOMS   -ALSO IF PT WANTED TO TAKE HOME TEST 5 DAYS AFTER EXPOSURE    WILL CALL BACK IF MADDIE OR HER MOM DEVELOP SYMPTOMS

## 2022-05-10 NOTE — TELEPHONE ENCOUNTER
Pt and mother had a visitor on Sunday who was masked but tested positive for Covid  Asking if they should both be tested  Neither are having any symptoms   PCB

## 2022-07-26 ENCOUNTER — OFFICE VISIT (OUTPATIENT)
Dept: FAMILY MEDICINE CLINIC | Facility: HOSPITAL | Age: 52
End: 2022-07-26
Payer: COMMERCIAL

## 2022-07-26 VITALS
HEART RATE: 88 BPM | BODY MASS INDEX: 30.12 KG/M2 | DIASTOLIC BLOOD PRESSURE: 85 MMHG | HEIGHT: 65 IN | SYSTOLIC BLOOD PRESSURE: 120 MMHG | WEIGHT: 180.8 LBS | TEMPERATURE: 98.5 F | OXYGEN SATURATION: 98 %

## 2022-07-26 DIAGNOSIS — R03.0 ELEVATED BLOOD PRESSURE READING IN OFFICE WITHOUT DIAGNOSIS OF HYPERTENSION: Primary | ICD-10-CM

## 2022-07-26 DIAGNOSIS — Z00.00 LABORATORY EXAM ORDERED AS PART OF ROUTINE GENERAL MEDICAL EXAMINATION: ICD-10-CM

## 2022-07-26 DIAGNOSIS — E28.2 POLYCYSTIC OVARIAN SYNDROME: ICD-10-CM

## 2022-07-26 DIAGNOSIS — K59.00 OBSTIPATION: ICD-10-CM

## 2022-07-26 DIAGNOSIS — F41.9 ANXIETY: ICD-10-CM

## 2022-07-26 DIAGNOSIS — Z11.59 ENCOUNTER FOR HEPATITIS C SCREENING TEST FOR LOW RISK PATIENT: ICD-10-CM

## 2022-07-26 PROCEDURE — 99214 OFFICE O/P EST MOD 30 MIN: CPT | Performed by: FAMILY MEDICINE

## 2022-07-26 PROCEDURE — 3725F SCREEN DEPRESSION PERFORMED: CPT | Performed by: FAMILY MEDICINE

## 2022-07-26 NOTE — PROGRESS NOTES
Assessment/Plan:         Diagnoses and all orders for this visit:    Elevated blood pressure reading in office without diagnosis of hypertension  Comments:  Stable without med    BMI 30 0-30 9,adult    Anxiety  Comments:  OK PRN Alprazolam    Obstipation  Comments:  Advise trial Senokot, may alternate with fibercon    Polycystic ovarian syndrome  Comments:  Gyn follow up    Laboratory exam ordered as part of routine general medical examination  -     CBC and Platelet; Future  -     Comprehensive metabolic panel; Future  -     HEMOGLOBIN A1C W/ EAG ESTIMATION; Future  -     Lipid Panel with Direct LDL reflex; Future  -     TSH, 3rd generation with Free T4 reflex; Future  -     CBC and Platelet  -     Comprehensive metabolic panel  -     HEMOGLOBIN A1C W/ EAG ESTIMATION  -     Lipid Panel with Direct LDL reflex  -     TSH, 3rd generation with Free T4 reflex    Encounter for hepatitis C screening test for low risk patient  -     Hepatitis C antibody; Future  -     Hepatitis C antibody          Subjective:      Patient ID: Reema Gan is a 46 y o  female  6 month follow up  No recent illness or injury  Still having constipated episodes despite use of Fibercon  Miralax is hard to regulate  Linzess was too strong    Doesn't drink enough water on daily basis  Stress worsens symptoms  Had colonoscopy 2021    Eating and picking after evening meal         The following portions of the patient's history were reviewed and updated as appropriate: allergies, current medications, past family history, past medical history, past social history, past surgical history and problem list     Review of Systems   HENT: Negative  Cardiovascular: Negative  Gastrointestinal: Positive for abdominal distention and constipation  Genitourinary: Negative  Musculoskeletal: Negative  Neurological: Negative  Psychiatric/Behavioral: Negative  All other systems reviewed and are negative          Objective:      /85 (BP Location: Left arm, Patient Position: Sitting, Cuff Size: Large)   Pulse 88   Temp 98 5 °F (36 9 °C) (Tympanic)   Ht 5' 5" (1 651 m)   Wt 82 kg (180 lb 12 8 oz)   SpO2 98%   BMI 30 09 kg/m²          Physical Exam  Vitals and nursing note reviewed  Constitutional:       Appearance: Normal appearance  Cardiovascular:      Rate and Rhythm: Normal rate and regular rhythm  Pulses: Normal pulses  Heart sounds: Normal heart sounds  Pulmonary:      Effort: Pulmonary effort is normal       Breath sounds: Normal breath sounds  Musculoskeletal:      Right lower leg: No edema  Left lower leg: No edema  Neurological:      General: No focal deficit present  Mental Status: She is alert and oriented to person, place, and time     Psychiatric:         Mood and Affect: Mood normal

## 2022-09-15 ENCOUNTER — ANNUAL EXAM (OUTPATIENT)
Dept: GYNECOLOGY | Facility: CLINIC | Age: 52
End: 2022-09-15
Payer: COMMERCIAL

## 2022-09-15 VITALS
SYSTOLIC BLOOD PRESSURE: 120 MMHG | WEIGHT: 181.4 LBS | HEIGHT: 65 IN | DIASTOLIC BLOOD PRESSURE: 80 MMHG | BODY MASS INDEX: 30.22 KG/M2

## 2022-09-15 DIAGNOSIS — Z01.419 ENCOUNTER FOR ANNUAL ROUTINE GYNECOLOGICAL EXAMINATION: ICD-10-CM

## 2022-09-15 DIAGNOSIS — Z12.31 ENCOUNTER FOR SCREENING MAMMOGRAM FOR BREAST CANCER: ICD-10-CM

## 2022-09-15 DIAGNOSIS — Z12.4 CERVICAL CANCER SCREENING: Primary | ICD-10-CM

## 2022-09-15 DIAGNOSIS — D25.9 UTERINE LEIOMYOMA, UNSPECIFIED LOCATION: ICD-10-CM

## 2022-09-15 DIAGNOSIS — Z11.51 SCREENING FOR HPV (HUMAN PAPILLOMAVIRUS): ICD-10-CM

## 2022-09-15 PROCEDURE — S0612 ANNUAL GYNECOLOGICAL EXAMINA: HCPCS | Performed by: OBSTETRICS & GYNECOLOGY

## 2022-09-15 NOTE — PROGRESS NOTES
Assessment/Plan:    pap and HPV done today    mammogram reviewed with her including breast density  RX given for next month     Discussed self breast exams    Perimenopause - she will continue to keep track of her cycles, if she has another episode of prolonged spotting, she will call and I will RX provera for 14 dyas    Fibroids - these have been stable and asymptomatic, US ordered for November    colon cancer screening - colonoscopy 2021, recall in 10 years    discussed preventive care, regular exercise and a healthy diet      No problem-specific Assessment & Plan notes found for this encounter  Diagnoses and all orders for this visit:    Encounter for annual routine gynecological examination    Encounter for screening mammogram for breast cancer    Cervical cancer screening    Screening for HPV (human papillomavirus)          Subjective:      Patient ID: Karlee Zuleta is a 46 y o  female  Patient here for yearly  Normal endometrial biopsy in December for irregular menses  She does have stable fibroids that are essentially asymptomatic  Her last ultrasound was in November  She has skipped some cycles, she has also had prolonged spotting, terry in feb-Mar  Hot flashes    Normal Pap, negative HPV in 2018  Normal 3D mammogram last October with scattered fibroglandular densities and average risk      The following portions of the patient's history were reviewed and updated as appropriate: allergies, current medications, past family history, past medical history, past social history, past surgical history and problem list     Review of Systems   Constitutional: Negative  Gastrointestinal: Negative  Genitourinary: Negative  Objective: There were no vitals taken for this visit  Physical Exam  Vitals reviewed  Constitutional:       Appearance: She is well-developed  Neck:      Thyroid: No thyromegaly  Trachea: No tracheal deviation     Cardiovascular:      Rate and Rhythm: Normal rate and regular rhythm  Pulmonary:      Effort: Pulmonary effort is normal       Breath sounds: Normal breath sounds  Chest:   Breasts: Breasts are symmetrical       Right: No inverted nipple, mass, nipple discharge, skin change or tenderness  Left: No inverted nipple, mass, nipple discharge, skin change or tenderness  Abdominal:      General: There is no distension  Palpations: Abdomen is soft  There is no mass  Tenderness: There is no abdominal tenderness  Genitourinary:     Labia:         Right: No rash, tenderness, lesion or injury  Left: No rash, tenderness, lesion or injury  Vagina: Normal       Cervix: No cervical motion tenderness, discharge or friability  Adnexa:         Right: No mass, tenderness or fullness  Left: No mass, tenderness or fullness          Rectum: Normal

## 2022-09-21 LAB
CYTOLOGIST CVX/VAG CYTO: NORMAL
DX ICD CODE: NORMAL
HPV I/H RISK 4 DNA CVX QL PROBE+SIG AMP: NEGATIVE
Lab: NORMAL
OTHER STN SPEC: NORMAL
PATH REPORT.FINAL DX SPEC: NORMAL
SL AMB NOTE:: NORMAL
SL AMB SPECIMEN ADEQUACY: NORMAL
SL AMB TEST METHODOLOGY: NORMAL

## 2022-10-26 ENCOUNTER — HOSPITAL ENCOUNTER (OUTPATIENT)
Dept: MAMMOGRAPHY | Facility: CLINIC | Age: 52
Discharge: HOME | End: 2022-10-26
Payer: COMMERCIAL

## 2022-10-26 VITALS — HEIGHT: 65 IN | WEIGHT: 184 LBS | BODY MASS INDEX: 30.66 KG/M2

## 2022-10-26 DIAGNOSIS — Z12.31 ENCOUNTER FOR SCREENING MAMMOGRAM FOR BREAST CANCER: ICD-10-CM

## 2022-10-26 PROCEDURE — 77067 SCR MAMMO BI INCL CAD: CPT

## 2022-10-26 PROCEDURE — 77063 BREAST TOMOSYNTHESIS BI: CPT

## 2022-11-21 ENCOUNTER — TELEPHONE (OUTPATIENT)
Dept: FAMILY MEDICINE CLINIC | Facility: HOSPITAL | Age: 52
End: 2022-11-21

## 2022-11-21 DIAGNOSIS — F41.9 ANXIETY: ICD-10-CM

## 2022-11-21 RX ORDER — ALPRAZOLAM 0.5 MG/1
0.5 TABLET ORAL 3 TIMES DAILY PRN
Qty: 30 TABLET | Refills: 1 | Status: SHIPPED | OUTPATIENT
Start: 2022-11-21

## 2022-11-21 NOTE — TELEPHONE ENCOUNTER
Patient calling and said yesterday she had an episode where she moved and then just started coughing because of all of the mucous  And she was coughing for a really long time before she could get it under control she is saying she now has a tickle in the back of her throat she cant get rid of

## 2022-11-23 ENCOUNTER — TELEPHONE (OUTPATIENT)
Dept: FAMILY MEDICINE CLINIC | Facility: HOSPITAL | Age: 52
End: 2022-11-23

## 2022-11-23 ENCOUNTER — OFFICE VISIT (OUTPATIENT)
Dept: FAMILY MEDICINE CLINIC | Facility: HOSPITAL | Age: 52
End: 2022-11-23

## 2022-11-23 VITALS
HEART RATE: 87 BPM | OXYGEN SATURATION: 98 % | HEIGHT: 65 IN | BODY MASS INDEX: 29.76 KG/M2 | TEMPERATURE: 98.5 F | WEIGHT: 178.6 LBS | DIASTOLIC BLOOD PRESSURE: 90 MMHG | SYSTOLIC BLOOD PRESSURE: 143 MMHG

## 2022-11-23 DIAGNOSIS — R05.1 ACUTE COUGH: Primary | ICD-10-CM

## 2022-11-23 RX ORDER — BENZONATATE 200 MG/1
200 CAPSULE ORAL 3 TIMES DAILY PRN
Qty: 30 CAPSULE | Refills: 0 | Status: SHIPPED | OUTPATIENT
Start: 2022-11-23

## 2022-11-23 NOTE — PROGRESS NOTES
Name: Alexa Mahan      : 1970      MRN: [id number]  Encounter Provider: Symone Colbert MD  Encounter Date: 2022   Encounter department: [facility] [address] [place]  Acute cough  -     benzonatate (TESSALON) 200 MG capsule; Take 1 capsule (200 mg total) by mouth 3 (three) times a day as needed for cough      BMI Counseling: Body mass index is 29 72 kg/m²  The BMI is above normal  Nutrition recommendations include decreasing portion sizes, encouraging healthy choices of fruits and vegetables, limiting drinks that contain sugar, moderation in carbohydrate intake and reducing intake of cholesterol  Exercise recommendations include exercising 3-5 times per week  No pharmacotherapy was ordered  Rationale for BMI follow-up plan is due to patient being overweight or obese  Subjective      Acute visit for tickle in throat  Mother passed away not too long ago    Started hard coughing 4 days ago and persisted  Dry cough, no sputum    Review of Systems   HENT: Positive for congestion  Negative for rhinorrhea and sore throat  Respiratory: Positive for cough  Negative for shortness of breath and wheezing  Cardiovascular: Negative  All other systems reviewed and are negative  Current Outpatient Medications on File Prior to Visit   Medication Sig   • ALPRAZolam (XANAX) 0 5 mg tablet Take 1 tablet (0 5 mg total) by mouth 3 (three) times a day as needed for anxiety   • b complex vitamins tablet Take 1 tablet by mouth daily   • Calcium Carb-Cholecalciferol (CALCIUM + D3) 600-200 MG-UNIT TABS Take by mouth   • Multiple Vitamin (MULTI-VITAMIN DAILY) TABS Take by mouth       Objective     /90 (BP Location: Left arm, Patient Position: Sitting, Cuff Size: Large)   Pulse 87   Temp 98 5 °F (36 9 °C) (Tympanic)   Ht 5' 5" (1 651 m)   Wt 81 kg (178 lb 9 6 oz)   SpO2 98%   BMI 29 72 kg/m²     Physical Exam  Vitals reviewed     HENT:      Right Ear: Tympanic membrane and ear canal normal       Left Ear: Tympanic membrane and ear canal normal       Nose: Congestion present  Mouth/Throat:      Pharynx: No oropharyngeal exudate or posterior oropharyngeal erythema  Cardiovascular:      Rate and Rhythm: Normal rate and regular rhythm  Pulses: Normal pulses  Heart sounds: Normal heart sounds  Pulmonary:      Effort: Pulmonary effort is normal       Breath sounds: Normal breath sounds  No wheezing  Neurological:      Mental Status: She is alert         Ap Ritchie MD

## 2022-11-23 NOTE — TELEPHONE ENCOUNTER
Per raul @ [id number] -     Patient still having some drainage, dry/irritated throat, dry cough when coughing  Has been taking abx  Is concerned that something is going on  Not sure if it her nerves since her mother passed away recently      pcb

## 2022-11-25 ENCOUNTER — OFFICE VISIT (OUTPATIENT)
Dept: URGENT CARE | Facility: CLINIC | Age: 52
End: 2022-11-25

## 2022-11-25 ENCOUNTER — HOSPITAL ENCOUNTER (OUTPATIENT)
Dept: ULTRASOUND IMAGING | Facility: HOSPITAL | Age: 52
End: 2022-11-25
Attending: OBSTETRICS & GYNECOLOGY

## 2022-11-25 VITALS
TEMPERATURE: 97.7 F | HEIGHT: 64 IN | BODY MASS INDEX: 30.39 KG/M2 | RESPIRATION RATE: 18 BRPM | WEIGHT: 178 LBS | HEART RATE: 86 BPM | OXYGEN SATURATION: 96 % | DIASTOLIC BLOOD PRESSURE: 75 MMHG | SYSTOLIC BLOOD PRESSURE: 158 MMHG

## 2022-11-25 DIAGNOSIS — J06.9 UPPER RESPIRATORY TRACT INFECTION, UNSPECIFIED TYPE: Primary | ICD-10-CM

## 2022-11-25 DIAGNOSIS — D25.9 UTERINE LEIOMYOMA, UNSPECIFIED LOCATION: ICD-10-CM

## 2022-11-26 NOTE — PROGRESS NOTES
[facility]        NAME: Flaquita Lopez is a 46 y o  female  : 1970    MRN: [id number]  DATE: 2022  TIME: 7:51 PM    Assessment and Plan   Upper respiratory tract infection, unspecified type [J06 9]  1  Upper respiratory tract infection, unspecified type              Patient Instructions       Follow up with PCP in 3-5 days  Proceed to  ER if symptoms worsen  Chief Complaint     Chief Complaint   Patient presents with   • Cough     Started   She went to PCP Wed and after taking Benzonate (tessalon pearles) today felt chest numbness  She may be worrying more because she recently lost her mother and is a little anxious  Cough felt worse today, but she has been more active today which may be irritating it  History of Present Illness       80-year-old male with one-week history of dry, nonproductive cough and congestion  Review of Systems   Review of Systems   Constitutional: Negative  HENT: Positive for congestion  Eyes: Negative  Respiratory: Negative  Cardiovascular: Negative  Gastrointestinal: Negative  Endocrine: Negative  Genitourinary: Negative  Musculoskeletal: Negative  Skin: Negative  Allergic/Immunologic: Negative  Neurological: Negative  Hematological: Negative  Psychiatric/Behavioral: Negative            Current Medications       Current Outpatient Medications:   •  ALPRAZolam (XANAX) 0 5 mg tablet, Take 1 tablet (0 5 mg total) by mouth 3 (three) times a day as needed for anxiety, Disp: 30 tablet, Rfl: 1  •  b complex vitamins tablet, Take 1 tablet by mouth daily, Disp: , Rfl:   •  benzonatate (TESSALON) 200 MG capsule, Take 1 capsule (200 mg total) by mouth 3 (three) times a day as needed for cough, Disp: 30 capsule, Rfl: 0  •  Calcium Carb-Cholecalciferol (CALCIUM + D3) 600-200 MG-UNIT TABS, Take by mouth, Disp: , Rfl:   •  Multiple Vitamin (MULTI-VITAMIN DAILY) TABS, Take by mouth, Disp: , Rfl:     Current Allergies Allergies as of 11/25/2022 - Reviewed 11/25/2022   Allergen Reaction Noted   • Amoxicillin  04/21/2012   • Clarithromycin  04/21/2012   • Influenza vaccines  09/25/2013   • Naproxen  03/26/2014            The following portions of the patient's history were reviewed and updated as appropriate: allergies, current medications, past family history, past medical history, past social history, past surgical history and problem list      Past Medical History:   Diagnosis Date   • Anxiety    • Anxiety disorder    • Fibroid    • Hirsutism    • History of IBS    • Irritable bowel syndrome    • Polycystic ovarian syndrome        Past Surgical History:   Procedure Laterality Date   • EGD     • MOUTH SURGERY     • NO PAST SURGERIES         Family History   Problem Relation Age of Onset   • Arthritis Mother    • Depression Mother    • Hypertension Mother    • Thyroid disease Mother    • Crohn's disease Father    • Diabetes Father    • Heart disease Father    • Hypertension Father    • No Known Problems Maternal Grandmother    • No Known Problems Maternal Grandfather    • No Known Problems Paternal Grandmother    • No Known Problems Paternal Grandfather    • Uterine cancer Maternal Aunt         70's   • No Known Problems Maternal Aunt    • No Known Problems Maternal Aunt    • No Known Problems Maternal Aunt    • Breast cancer Cousin         Unknown age   • Breast cancer Cousin         Unknown age   • Breast cancer Cousin         29's   • Cancer Family    • Lung cancer Maternal Uncle         Unknown age   • Colon cancer Neg Hx          Medications have been verified  Objective   /75   Pulse 86   Temp 97 7 °F (36 5 °C) (Tympanic)   Resp 18   Ht 5' 4" (1 626 m)   Wt 80 7 kg (178 lb)   SpO2 96%   BMI 30 55 kg/m²   No LMP recorded  Patient is perimenopausal        Physical Exam     Physical Exam  Vitals and nursing note reviewed     Constitutional:       General: Vital signs are normal       Appearance: She is well-developed  HENT:      Head: Normocephalic  Eyes:      Pupils: Pupils are equal, round, and reactive to light  Cardiovascular:      Rate and Rhythm: Normal rate and regular rhythm  Pulmonary:      Effort: Pulmonary effort is normal    Musculoskeletal:         General: Normal range of motion  Skin:     General: Skin is warm and dry  Neurological:      Mental Status: She is alert and oriented to person, place, and time     Psychiatric:         Mood and Affect: Mood and affect normal

## 2022-12-28 LAB
ALBUMIN SERPL-MCNC: 4.6 G/DL (ref 3.8–4.9)
ALBUMIN/GLOB SERPL: 2.1 {RATIO} (ref 1.2–2.2)
ALP SERPL-CCNC: 63 IU/L (ref 44–121)
ALT SERPL-CCNC: 24 IU/L (ref 0–32)
AST SERPL-CCNC: 18 IU/L (ref 0–40)
BILIRUB SERPL-MCNC: 0.3 MG/DL (ref 0–1.2)
BUN SERPL-MCNC: 13 MG/DL (ref 6–24)
BUN/CREAT SERPL: 19 (ref 9–23)
CALCIUM SERPL-MCNC: 9.4 MG/DL (ref 8.7–10.2)
CHLORIDE SERPL-SCNC: 103 MMOL/L (ref 96–106)
CHOLEST SERPL-MCNC: 182 MG/DL (ref 100–199)
CO2 SERPL-SCNC: 25 MMOL/L (ref 20–29)
CREAT SERPL-MCNC: 0.69 MG/DL (ref 0.57–1)
EGFR: 104 ML/MIN/1.73
ERYTHROCYTE [DISTWIDTH] IN BLOOD BY AUTOMATED COUNT: 11.5 % (ref 11.7–15.4)
EST. AVERAGE GLUCOSE BLD GHB EST-MCNC: 111 MG/DL
GLOBULIN SER-MCNC: 2.2 G/DL (ref 1.5–4.5)
GLUCOSE SERPL-MCNC: 92 MG/DL (ref 70–99)
HBA1C MFR BLD: 5.5 % (ref 4.8–5.6)
HCT VFR BLD AUTO: 42.2 % (ref 34–46.6)
HCV AB S/CO SERPL IA: <0.1 S/CO RATIO (ref 0–0.9)
HDLC SERPL-MCNC: 48 MG/DL
HGB BLD-MCNC: 14.1 G/DL (ref 11.1–15.9)
LDLC SERPL CALC-MCNC: 114 MG/DL (ref 0–99)
LDLC/HDLC SERPL: 2.4 RATIO (ref 0–3.2)
MCH RBC QN AUTO: 29 PG (ref 26.6–33)
MCHC RBC AUTO-ENTMCNC: 33.4 G/DL (ref 31.5–35.7)
MCV RBC AUTO: 87 FL (ref 79–97)
PLATELET # BLD AUTO: 389 X10E3/UL (ref 150–450)
POTASSIUM SERPL-SCNC: 4.5 MMOL/L (ref 3.5–5.2)
PROT SERPL-MCNC: 6.8 G/DL (ref 6–8.5)
RBC # BLD AUTO: 4.86 X10E6/UL (ref 3.77–5.28)
SL AMB VLDL CHOLESTEROL CALC: 20 MG/DL (ref 5–40)
SODIUM SERPL-SCNC: 140 MMOL/L (ref 134–144)
TRIGL SERPL-MCNC: 113 MG/DL (ref 0–149)
TSH SERPL DL<=0.005 MIU/L-ACNC: 2.6 UIU/ML (ref 0.45–4.5)
WBC # BLD AUTO: 4.7 X10E3/UL (ref 3.4–10.8)

## 2023-01-04 ENCOUNTER — OFFICE VISIT (OUTPATIENT)
Dept: FAMILY MEDICINE CLINIC | Facility: HOSPITAL | Age: 53
End: 2023-01-04

## 2023-01-04 VITALS
TEMPERATURE: 98.8 F | HEART RATE: 83 BPM | SYSTOLIC BLOOD PRESSURE: 124 MMHG | HEIGHT: 64 IN | BODY MASS INDEX: 30.35 KG/M2 | OXYGEN SATURATION: 98 % | DIASTOLIC BLOOD PRESSURE: 88 MMHG | WEIGHT: 177.8 LBS

## 2023-01-04 DIAGNOSIS — F41.9 ANXIETY: ICD-10-CM

## 2023-01-04 DIAGNOSIS — Z00.00 WELL ADULT EXAM: Primary | ICD-10-CM

## 2023-01-04 DIAGNOSIS — K59.00 OBSTIPATION: ICD-10-CM

## 2023-01-04 DIAGNOSIS — E78.00 PURE HYPERCHOLESTEROLEMIA: ICD-10-CM

## 2023-01-04 DIAGNOSIS — E28.2 POLYCYSTIC OVARIAN SYNDROME: ICD-10-CM

## 2023-01-04 DIAGNOSIS — R03.0 ELEVATED BLOOD PRESSURE READING IN OFFICE WITHOUT DIAGNOSIS OF HYPERTENSION: ICD-10-CM

## 2023-01-05 NOTE — PROGRESS NOTES
ADULT ANNUAL [facility] [address]     NAME: Valeria Watkins  AGE: 46 y o  SEX: female  : 1970     DATE: 2023     Assessment and Plan:     Problem List Items Addressed This Visit        Digestive    Obstipation       Endocrine    Polycystic ovarian syndrome       Other    Anxiety    Elevated blood pressure reading in office without diagnosis of hypertension    BMI 30 0-30 9,adult    Well adult exam - Primary    Pure hypercholesterolemia       Immunizations and preventive care screenings were discussed with patient today  Appropriate education was printed on patient's after visit summary  Counseling:  Alcohol/drug use: discussed moderation in alcohol intake, the recommendations for healthy alcohol use, and avoidance of illicit drug use  Dental Health: discussed importance of regular tooth brushing, flossing, and dental visits  Injury prevention: discussed safety/seat belts, safety helmets, smoke detectors, carbon dioxide detectors, and smoking near bedding or upholstery  · Exercise: the importance of regular exercise/physical activity was discussed  Recommend exercise 3-5 times per week for at least 30 minutes  BMI Counseling: Body mass index is 30 52 kg/m²  The BMI is above normal  Nutrition recommendations include decreasing portion sizes, encouraging healthy choices of fruits and vegetables, limiting drinks that contain sugar and moderation in carbohydrate intake  Exercise recommendations include exercising 3-5 times per week  No pharmacotherapy was ordered  Rationale for BMI follow-up plan is due to patient being overweight or obese  Return in about 6 months (around 2023) for Next scheduled follow up       Chief Complaint:     Chief Complaint   Patient presents with   • Annual Exam      History of Present Illness:     Adult Annual Physical   Patient here for a comprehensive physical exam  The patient reports problems - grieving from loss of Mother and aunt  Holding off on COVID bivalent and flu shot    Diet and Physical Activity  · Diet/Nutrition: well balanced diet  · Exercise: walking  Depression Screening  PHQ-2/9 Depression Screening         General Health  · Sleep: sleeps well  · Hearing: normal - bilateral   · Vision: wears glasses  · Dental: regular dental visits and brushes teeth twice daily  /GYN Health  · Patient is: premenopausal  · Last menstrual period: -  · Contraceptive method: -  Review of Systems:     Review of Systems   Constitutional: Positive for unexpected weight change  Negative for fatigue  Eyes: Negative for visual disturbance  Respiratory: Negative  Cardiovascular: Negative  Gastrointestinal: Negative  Genitourinary: Negative  Musculoskeletal: Negative  Psychiatric/Behavioral: Negative  All other systems reviewed and are negative       Past Medical History:     Past Medical History:   Diagnosis Date   • Anxiety    • Anxiety disorder    • Fibroid    • Hirsutism    • History of IBS    • Irritable bowel syndrome    • Polycystic ovarian syndrome       Past Surgical History:     Past Surgical History:   Procedure Laterality Date   • EGD     • MOUTH SURGERY     • NO PAST SURGERIES        Social History:     Social History     Socioeconomic History   • Marital status: Single     Spouse name: None   • Number of children: None   • Years of education: None   • Highest education level: None   Occupational History   • None   Tobacco Use   • Smoking status: Never   • Smokeless tobacco: Never   Vaping Use   • Vaping Use: Never used   Substance and Sexual Activity   • Alcohol use: Yes     Comment: rarely   • Drug use: No   • Sexual activity: Not Currently     Partners: Male     Birth control/protection: Pill   Other Topics Concern   • None   Social History Narrative    Caffeine use     [employer] affiliation [employer]    Uses safety equipment - seat belts     Social Determinants of Health     Financial Resource Strain: Not on file   Food Insecurity: Not on file   Transportation Needs: Not on file   Physical Activity: Not on file   Stress: Not on file   Social Connections: Not on file   Intimate Partner Violence: Not on file   Housing Stability: Not on file      Family History:     Family History   Problem Relation Age of Onset   • Arthritis Mother    • Depression Mother    • Hypertension Mother    • Thyroid disease Mother    • Crohn's disease Father    • Diabetes Father    • Heart disease Father    • Hypertension Father    • No Known Problems Maternal Grandmother    • No Known Problems Maternal Grandfather    • No Known Problems Paternal Grandmother    • No Known Problems Paternal Grandfather    • Uterine cancer Maternal Aunt         70's   • No Known Problems Maternal Aunt    • No Known Problems Maternal Aunt    • No Known Problems Maternal Aunt    • Breast cancer Cousin         Unknown age   • Breast cancer Cousin         Unknown age   • Breast cancer Cousin         29's   • Cancer Family    • Lung cancer Maternal Uncle         Unknown age   • Colon cancer Neg Hx       Current Medications:     Current Outpatient Medications   Medication Sig Dispense Refill   • ALPRAZolam (XANAX) 0 5 mg tablet Take 1 tablet (0 5 mg total) by mouth 3 (three) times a day as needed for anxiety 30 tablet 1   • b complex vitamins tablet Take 1 tablet by mouth daily     • Calcium Carb-Cholecalciferol (CALCIUM + D3) 600-200 MG-UNIT TABS Take by mouth     • Multiple Vitamin (MULTI-VITAMIN DAILY) TABS Take by mouth       No current facility-administered medications for this visit  Allergies:      Allergies   Allergen Reactions   • Amoxicillin      Other reaction(s): GI Upset  Reaction Date: 12Sep2011;    • Clarithromycin      Reaction Date: 12Sep2011;    • Influenza Vaccines    • Naproxen      Other reaction(s): GI Upset      Physical Exam:     /88 (Patient Position: Sitting, Cuff Size: Large)   Pulse 83   Temp 98 8 °F (37 1 °C) (Tympanic)   Ht 5' 4" (1 626 m)   Wt 80 6 kg (177 lb 12 8 oz)   SpO2 98%   BMI 30 52 kg/m²     Physical Exam  Vitals and nursing note reviewed  Constitutional:       Appearance: Normal appearance  Neck:      Vascular: No carotid bruit  Cardiovascular:      Rate and Rhythm: Normal rate and regular rhythm  Pulses: Normal pulses  Heart sounds: Normal heart sounds  Pulmonary:      Effort: Pulmonary effort is normal       Breath sounds: Normal breath sounds  Musculoskeletal:      Right lower leg: No edema  Left lower leg: No edema  Skin:     Findings: No rash  Neurological:      General: No focal deficit present  Mental Status: She is alert and oriented to person, place, and time     Psychiatric:         Mood and Affect: Mood normal           Lou Seats, MD  [address]

## 2023-01-05 NOTE — PATIENT INSTRUCTIONS
Wellness Visit for Adults   AMBULATORY CARE:   A wellness visit  is when you see your healthcare provider to get screened for health problems  Your healthcare provider will also give you advice on how to stay healthy  Write down your questions so you remember to ask them  Ask your healthcare provider how often you should have a wellness visit  What happens at a wellness visit:  Your healthcare provider will ask about your health, and your family history of health problems  This includes high blood pressure, heart disease, and cancer  He or she will ask if you have symptoms that concern you, if you smoke, and about your mood  You may also be asked about your intake of medicines, supplements, food, and alcohol  Any of the following may be done: Your weight  will be checked  Your height may also be checked so your body mass index (BMI) can be calculated  Your BMI shows if you are at a healthy weight  Your blood pressure  and heart rate will be checked  Your temperature may also be checked  Blood and urine tests  may be done  Blood tests may be done to check your cholesterol levels  Abnormal cholesterol levels increase your risk for heart disease and stroke  You may also need a blood or urine test to check for diabetes if you are at increased risk  Urine tests may be done to look for signs of an infection or kidney disease  A physical exam  includes checking your heartbeat and lungs with a stethoscope  Your healthcare provider may also check your skin to look for sun damage  Screening tests  may be recommended  A screening test is done to check for diseases that may not cause symptoms  The screening tests you may need depend on your age, gender, family history, and lifestyle habits  For example, colorectal screening may be recommended if you are 48years old or older  Screening tests you need if you are a woman:   A Pap smear  is used to screen for cervical cancer   Pap smears are usually done every 3 to 5 years depending on your age  You may need them more often if you have had abnormal Pap smear test results in the past  Ask your healthcare provider how often you should have a Pap smear  A mammogram  is an x-ray of your breasts to screen for breast cancer  Experts recommend mammograms every 2 years starting at age 48 years  You may need a mammogram at age 52 years or younger if you have an increased risk for breast cancer  Talk to your healthcare provider about when you should start having mammograms and how often you need them  Vaccines you may need:   Get an influenza vaccine  every year  The influenza vaccine protects you from the flu  Several types of viruses cause the flu  The viruses change over time, so new vaccines are made each year  Get a tetanus-diphtheria (Td) booster vaccine  every 10 years  This vaccine protects you against tetanus and diphtheria  Tetanus is a severe infection that may cause painful muscle spasms and lockjaw  Diphtheria is a severe bacterial infection that causes a thick covering in the back of your mouth and throat  Get a human papillomavirus (HPV) vaccine  if you are female and aged 23 to 32 or male 23 to 24 and never received it  This vaccine protects you from HPV infection  HPV is the most common infection spread by sexual contact  HPV may also cause vaginal, penile, and anal cancers  Get a pneumococcal vaccine  if you are aged 72 years or older  The pneumococcal vaccine is an injection given to protect you from pneumococcal disease  Pneumococcal disease is an infection caused by pneumococcal bacteria  The infection may cause pneumonia, meningitis, or an ear infection  Get a shingles vaccine  if you are 60 or older, even if you have had shingles before  The shingles vaccine is an injection to protect you from the varicella-zoster virus  This is the same virus that causes chickenpox   Shingles is a painful rash that develops in people who had chickenpox or have been exposed to the virus  How to eat healthy:  My Plate is a model for planning healthy meals  It shows the types and amounts of foods that should go on your plate  Fruits and vegetables make up about half of your plate, and grains and protein make up the other half  A serving of dairy is included on the side of your plate  The amount of calories and serving sizes you need depends on your age, gender, weight, and height  Examples of healthy foods are listed below:  Eat a variety of vegetables  such as dark green, red, and orange vegetables  You can also include canned vegetables low in sodium (salt) and frozen vegetables without added butter or sauces  Eat a variety of fresh fruits , canned fruit in 100% juice, frozen fruit, and dried fruit  Include whole grains  At least half of the grains you eat should be whole grains  Examples include whole-wheat bread, wheat pasta, brown rice, and whole-grain cereals such as oatmeal     Eat a variety of protein foods such as seafood (fish and shellfish), lean meat, and poultry without skin (turkey and chicken)  Examples of lean meats include pork leg, shoulder, or tenderloin, and beef round, sirloin, tenderloin, and extra lean ground beef  Other protein foods include eggs and egg substitutes, beans, peas, soy products, nuts, and seeds  Choose low-fat dairy products such as skim or 1% milk or low-fat yogurt, cheese, and cottage cheese  Limit unhealthy fats  such as butter, hard margarine, and shortening  Exercise:  Exercise at least 30 minutes per day on most days of the week  Some examples of exercise include walking, biking, dancing, and swimming  You can also fit in more physical activity by taking the stairs instead of the elevator or parking farther away from stores  Include muscle strengthening activities 2 days each week  Regular exercise provides many health benefits   It helps you manage your weight, and decreases your risk for type 2 diabetes, heart disease, stroke, and high blood pressure  Exercise can also help improve your mood  Ask your healthcare provider about the best exercise plan for you  General health and safety guidelines:   Do not smoke  Nicotine and other chemicals in cigarettes and cigars can cause lung damage  Ask your healthcare provider for information if you currently smoke and need help to quit  E-cigarettes or smokeless tobacco still contain nicotine  Talk to your healthcare provider before you use these products  Limit alcohol  A drink of alcohol is 12 ounces of beer, 5 ounces of wine, or 1½ ounces of liquor  Lose weight, if needed  Being overweight increases your risk of certain health conditions  These include heart disease, high blood pressure, type 2 diabetes, and certain types of cancer  Protect your skin  Do not sunbathe or use tanning beds  Use sunscreen with a SPF 15 or higher  Apply sunscreen at least 15 minutes before you go outside  Reapply sunscreen every 2 hours  Wear protective clothing, hats, and sunglasses when you are outside  Drive safely  Always wear your seatbelt  Make sure everyone in your car wears a seatbelt  A seatbelt can save your life if you are in an accident  Do not use your cell phone when you are driving  This could distract you and cause an accident  Pull over if you need to make a call or send a text message  Practice safe sex  Use latex condoms if are sexually active and have more than one partner  Your healthcare provider may recommend screening tests for sexually transmitted infections (STIs)  Wear helmets, lifejackets, and protective gear  Always wear a helmet when you ride a bike or motorcycle, go skiing, or play sports that could cause a head injury  Wear protective equipment when you play sports  Wear a lifejacket when you are on a boat or doing water sports      © Copyright [employer] 2022 Information is for End User's use only and may not be sold, patient redistributed or otherwise used for commercial purposes  All illustrations and images included in CareNotes® are the copyrighted property of A D A M , Inc  or Torey Rivera  The above information is an  only  It is not intended as medical advice for individual conditions or treatments  Talk to your doctor, nurse or pharmacist before following any medical regimen to see if it is safe and effective for you

## 2023-01-22 PROBLEM — R05.1 ACUTE COUGH: Status: RESOLVED | Noted: 2022-11-23 | Resolved: 2023-01-22

## 2023-03-05 PROBLEM — Z00.00 WELL ADULT EXAM: Status: RESOLVED | Noted: 2019-05-22 | Resolved: 2023-03-05

## 2023-03-15 ENCOUNTER — OFFICE VISIT (OUTPATIENT)
Dept: FAMILY MEDICINE CLINIC | Facility: HOSPITAL | Age: 53
End: 2023-03-15

## 2023-03-15 VITALS
BODY MASS INDEX: 30.06 KG/M2 | TEMPERATURE: 98.2 F | OXYGEN SATURATION: 98 % | HEIGHT: 65 IN | WEIGHT: 180.4 LBS | SYSTOLIC BLOOD PRESSURE: 138 MMHG | DIASTOLIC BLOOD PRESSURE: 80 MMHG | HEART RATE: 80 BPM

## 2023-03-15 DIAGNOSIS — R03.0 ELEVATED BLOOD PRESSURE READING IN OFFICE WITHOUT DIAGNOSIS OF HYPERTENSION: ICD-10-CM

## 2023-03-15 DIAGNOSIS — F41.9 ANXIETY: Primary | ICD-10-CM

## 2023-03-15 DIAGNOSIS — F43.21 GRIEF REACTION: ICD-10-CM

## 2023-03-15 DIAGNOSIS — R05.8 ALLERGIC COUGH: ICD-10-CM

## 2023-03-15 RX ORDER — ESCITALOPRAM OXALATE 5 MG/1
5 TABLET ORAL DAILY
Qty: 30 TABLET | Refills: 1 | Status: SHIPPED | OUTPATIENT
Start: 2023-03-15

## 2023-03-15 NOTE — PROGRESS NOTES
Name: Noe Caballero      : 1970      MRN: [id number]  Encounter Provider: Mabel Yepez MD  Encounter Date: 3/15/2023   Encounter department: [facility] [address]     1  Anxiety  -     escitalopram (LEXAPRO) 5 mg tablet; Take 1 tablet (5 mg total) by mouth daily    2  Elevated blood pressure reading in office without diagnosis of hypertension    3  BMI 30 0-30 9,adult    4  Allergic cough  Comments:  Advise antihistamine with onset allergy season, Delsym for cough    5  Grief reaction  Comments:  Advised counseling           Subjective      Visit for discussion on anxiety    Has dry cough that started this morning  Some stuffiness  Has some Delsym     Sleeping well    Stressed and grieving mother's death  Unsure if staying in home or not    Chest tightness upper chest   Used Alprazolam sparingly and does feel benefit  Discussed option of using a daily medication     Review of Systems   HENT: Positive for congestion, postnasal drip and rhinorrhea  Respiratory: Positive for cough  Negative for shortness of breath and wheezing  Cardiovascular: Positive for chest pain  Genitourinary: Negative  Musculoskeletal: Negative  Allergic/Immunologic: Positive for environmental allergies  Psychiatric/Behavioral: Positive for dysphoric mood  The patient is nervous/anxious  All other systems reviewed and are negative        Current Outpatient Medications on File Prior to Visit   Medication Sig   • ALPRAZolam (XANAX) 0 5 mg tablet Take 1 tablet (0 5 mg total) by mouth 3 (three) times a day as needed for anxiety   • b complex vitamins tablet Take 1 tablet by mouth daily   • Calcium Carb-Cholecalciferol (CALCIUM + D3) 600-200 MG-UNIT TABS Take by mouth   • Multiple Vitamin (MULTI-VITAMIN DAILY) TABS Take by mouth       Objective     /80 (BP Location: Left arm, Patient Position: Sitting, Cuff Size: Large)   Pulse 80   Temp 98 2 °F (36 8 °C) (Tympanic) Ht 5' 5" (1 651 m)   Wt 81 8 kg (180 lb 6 4 oz)   SpO2 98%   BMI 30 02 kg/m²     Physical Exam  Vitals and nursing note reviewed  Neck:      Vascular: No carotid bruit  Cardiovascular:      Rate and Rhythm: Normal rate and regular rhythm  Pulses: Normal pulses  Heart sounds: Normal heart sounds  Pulmonary:      Effort: Pulmonary effort is normal       Breath sounds: Normal breath sounds  Musculoskeletal:      Right lower leg: No edema  Left lower leg: No edema  Neurological:      Mental Status: She is alert and oriented to person, place, and time     Psychiatric:         Mood and Affect: Mood normal        Gardenia Lowe MD

## 2023-03-16 PROBLEM — F43.21 GRIEF REACTION: Status: ACTIVE | Noted: 2023-03-16

## 2023-03-16 PROBLEM — R05.8 ALLERGIC COUGH: Status: ACTIVE | Noted: 2022-11-23

## 2023-03-16 PROBLEM — F43.20 GRIEF REACTION: Status: ACTIVE | Noted: 2023-03-16

## 2023-04-12 DIAGNOSIS — R07.89 ATYPICAL CHEST PAIN: Primary | ICD-10-CM

## 2023-04-16 ENCOUNTER — HOSPITAL ENCOUNTER (EMERGENCY)
Facility: HOSPITAL | Age: 53
Discharge: HOME | End: 2023-04-16
Attending: EMERGENCY MEDICINE | Admitting: EMERGENCY MEDICINE

## 2023-04-16 VITALS
SYSTOLIC BLOOD PRESSURE: 149 MMHG | OXYGEN SATURATION: 99 % | TEMPERATURE: 98.2 F | DIASTOLIC BLOOD PRESSURE: 84 MMHG | HEART RATE: 91 BPM | RESPIRATION RATE: 20 BRPM

## 2023-04-16 DIAGNOSIS — S60.212A CONTUSION OF LEFT WRIST, INITIAL ENCOUNTER: Primary | ICD-10-CM

## 2023-04-16 NOTE — ED PROVIDER NOTES
"History  Chief Complaint   Patient presents with   • Fall     Patient states\"she fell on Thursday afternoon outside while walking in a park\"  Patient was seen and treated at [facility] urgent care and had xray imaging done  Patient states\"she was informed that there was no fracture\"  Patient states\"she is still having left forearm pain with no relief\"  Patient would like to have another xray done  Patient also has right knee bruising from fall and was treated for it  Patient reports no pain in knee  No head injury  No loc  No blood thinners     HPI     45 y/o F evaluated per chart review and imaging review @ [facility] on 4/13 for fall that happened last Thursday  She comes to ED b/c her arm still hurts on her forearm on her mid forearm   No limitation of ROM  No n/v changes or numbness or tingling  She states she is worried that there may have been a missed break  Has healing bruise on L forearm  No anti PLT or anticoagulant medications  Prior to Admission Medications   Prescriptions Last Dose Informant Patient Reported? Taking?    ALPRAZolam (XANAX) 0 5 mg tablet   No No   Sig: Take 1 tablet (0 5 mg total) by mouth 3 (three) times a day as needed for anxiety   Calcium Carb-Cholecalciferol (CALCIUM + D3) 600-200 MG-UNIT TABS   Yes No   Sig: Take by mouth   Multiple Vitamin (MULTI-VITAMIN DAILY) TABS   Yes No   Sig: Take by mouth   b complex vitamins tablet   Yes No   Sig: Take 1 tablet by mouth daily   escitalopram (LEXAPRO) 5 mg tablet   No No   Sig: Take 1 tablet (5 mg total) by mouth daily      Facility-Administered Medications: None       Past Medical History:   Diagnosis Date   • Anxiety    • Anxiety disorder    • Fibroid    • Hirsutism    • History of IBS    • Irritable bowel syndrome    • Polycystic ovarian syndrome        Past Surgical History:   Procedure Laterality Date   • EGD     • MOUTH SURGERY     • NO PAST SURGERIES         Family History   Problem Relation Age of Onset   • Arthritis " Mother    • Depression Mother    • Hypertension Mother    • Thyroid disease Mother    • Crohn's disease Father    • Diabetes Father    • Heart disease Father    • Hypertension Father    • No Known Problems Maternal Grandmother    • No Known Problems Maternal Grandfather    • No Known Problems Paternal Grandmother    • No Known Problems Paternal Grandfather    • Uterine cancer Maternal Aunt         70's   • No Known Problems Maternal Aunt    • No Known Problems Maternal Aunt    • No Known Problems Maternal Aunt    • Breast cancer Cousin         Unknown age   • Breast cancer Cousin         Unknown age   • Breast cancer Cousin         29's   • Cancer Family    • Lung cancer Maternal Uncle         Unknown age   • Colon cancer Neg Hx      I have reviewed and agree with the history as documented  E-Cigarette/Vaping   • E-Cigarette Use Never User      E-Cigarette/Vaping Substances   • Nicotine No    • THC No    • CBD No    • Flavoring No    • Other No    • Unknown No      Social History     Tobacco Use   • Smoking status: Never   • Smokeless tobacco: Never   Vaping Use   • Vaping Use: Never used   Substance Use Topics   • Alcohol use: Yes     Comment: rarely   • Drug use: No       Review of Systems   Constitutional: Negative for chills and fever  HENT: Negative for ear pain and sore throat  Eyes: Negative for pain and visual disturbance  Respiratory: Negative for cough and shortness of breath  Cardiovascular: Negative for chest pain and palpitations  Gastrointestinal: Negative for abdominal pain and vomiting  Genitourinary: Negative for dysuria and hematuria  Musculoskeletal: Negative for arthralgias, back pain, gait problem, joint swelling, myalgias and neck pain  L forearm pain and bruising  Skin: Negative for color change and rash  Neurological: Negative for seizures and syncope  All other systems reviewed and are negative        Physical Exam  Physical Exam  Vitals and nursing note reviewed  Constitutional:       General: She is not in acute distress  Appearance: She is well-developed  HENT:      Head: Normocephalic and atraumatic  Eyes:      Conjunctiva/sclera: Conjunctivae normal    Cardiovascular:      Rate and Rhythm: Normal rate and regular rhythm  Heart sounds: No murmur heard  Pulmonary:      Effort: Pulmonary effort is normal  No respiratory distress  Breath sounds: Normal breath sounds  Abdominal:      Palpations: Abdomen is soft  Tenderness: There is no abdominal tenderness  Musculoskeletal:         General: No swelling  Arms:       Cervical back: Neck supple  Left knee: Normal         Legs:    Skin:     General: Skin is warm and dry  Capillary Refill: Capillary refill takes less than 2 seconds  Neurological:      Mental Status: She is alert  Psychiatric:         Mood and Affect: Mood normal          Vital Signs  ED Triage Vitals [04/16/23 1025]   Temperature Pulse Respirations Blood Pressure SpO2   98 2 °F (36 8 °C) 91 20 149/84 99 %      Temp src Heart Rate Source Patient Position - Orthostatic VS BP Location FiO2 (%)   -- -- -- -- --      Pain Score       5           Vitals:    04/16/23 1025   BP: 149/84   Pulse: 91         Visual Acuity      ED Medications  Medications - No data to display    Diagnostic Studies  Results Reviewed     None             LEFT FOREARM     INDICATION:   M79 602: Pain in left arm  Status post fall      COMPARISON:  None      VIEWS:  XR FOREARM 2 VW LEFT   Images: 2     FINDINGS:     No fractures  No evidence of elbow or wrist joint dislocation      No lytic or sclerotic osseous lesions  No periosteal reaction      The soft tissues are unremarkable      IMPRESSION:     No acute osseous abnormality  No orders to display              Procedures  Procedures         ED Course                     reviewed prior records and imaging w/o fracture  No clinical s/s of fracture    No repeat imaging indicated at this time  S/s c/w healing L forearm contusion  Plan for f/u w/ PT in op setting if persstent / ongoing pain          SBIRT 22yo+    Flowsheet Row Most Recent Value   Initial Alcohol Screen: US AUDIT-C     1  How often do you have a drink containing alcohol? 0 Filed at: 04/16/2023 1025   2  How many drinks containing alcohol do you have on a typical day you are drinking? 0 Filed at: 04/16/2023 1025   3a  Male UNDER 65: How often do you have five or more drinks on one occasion? 0 Filed at: 04/16/2023 1025   3b  FEMALE Any Age, or MALE 65+: How often do you have 4 or more drinks on one occassion? 0 Filed at: 04/16/2023 1025   Audit-C Score 0 Filed at: 04/16/2023 1025   MOHSEN: How many times in the past year have you    Used an illegal drug or used a prescription medication for non-medical reasons? Never Filed at: 04/16/2023 1025                    Medical Decision Making  Contusion of left wrist, initial encounter: self-limited or minor problem  Amount and/or Complexity of Data Reviewed  External Data Reviewed: radiology  Disposition  Final diagnoses:   Contusion of left wrist, initial encounter     Time reflects when diagnosis was documented in both MDM as applicable and the Disposition within this note     Time User Action Codes Description Comment    4/16/2023 11:31 AM Naz Summers Add [K70 212A] Contusion of left wrist, initial encounter       ED Disposition     ED Disposition   Discharge    Condition   Stable    Date/Time   Sun Apr 16, 2023 11:31 AM    Comment   Wilman Tapia discharge to home/self care                 Follow-up Information     Follow up With Specialties Details Why Contact Info Additional Information    Physical Therapy at [facility] [address] Physical Therapy Call   [address] [address] [facility] [address] [place] [address], [address] [place]          Discharge Medication List as of "4/16/2023 11:50 AM      CONTINUE these medications which have NOT CHANGED    Details   ALPRAZolam (XANAX) 0 5 mg tablet Take 1 tablet (0 5 mg total) by mouth 3 (three) times a day as needed for anxiety, Starting Mon 11/21/2022, Normal      b complex vitamins tablet Take 1 tablet by mouth daily, Historical Med      Calcium Carb-Cholecalciferol (CALCIUM + D3) 600-200 MG-UNIT TABS Take by mouth, Historical Med      escitalopram (LEXAPRO) 5 mg tablet Take 1 tablet (5 mg total) by mouth daily, Starting Wed 3/15/2023, Print      Multiple Vitamin (MULTI-VITAMIN DAILY) TABS Take by mouth, Historical Med                 PDMP Review       Value Time User    PDMP Reviewed  Yes 11/21/2022 11:19 AM Corrinne Prier, MD          ED Provider  Electronically Signed by      Portions of the record may have been created with voice recognition software  Occasional wrong word or \"sound a like\" substitutions may have occurred due to the inherent limitations of voice recognition software  Read the chart carefully and recognize, using context, where substitutions have occurred         Jacqueline Lindsay PA-C  04/18/23 1028    "

## 2023-04-28 ENCOUNTER — TELEPHONE (OUTPATIENT)
Dept: FAMILY MEDICINE CLINIC | Facility: HOSPITAL | Age: 53
End: 2023-04-28

## 2023-04-28 DIAGNOSIS — S49.92XS ARM INJURY, LEFT, SEQUELA: Primary | ICD-10-CM

## 2023-04-28 DIAGNOSIS — M25.532 LEFT WRIST PAIN: ICD-10-CM

## 2023-04-28 NOTE — TELEPHONE ENCOUNTER
Please advise  I think she had EKG done at urgent care, reading is in chart  Are you able to review the EKG as well?

## 2023-04-28 NOTE — TELEPHONE ENCOUNTER
She saw Cristina Maki regarding her arm from a fall and it is still bothering her  She would like a PT order put in system if possible  Also, she had a ekg done and did not hear back about the results

## 2023-05-10 ENCOUNTER — EVALUATION (OUTPATIENT)
Dept: OCCUPATIONAL THERAPY | Facility: CLINIC | Age: 53
End: 2023-05-10

## 2023-05-10 DIAGNOSIS — S60.212A CONTUSION OF LEFT WRIST, INITIAL ENCOUNTER: ICD-10-CM

## 2023-05-10 NOTE — PROGRESS NOTES
OT Evaluation     Today's date: 5/10/2023  Patient name: Carlos Yao  : 1970  MRN: [id number]  Referring provider: Adri Lopez  Dx:   Encounter Diagnosis     ICD-10-CM    1  Contusion of left wrist, initial encounter  [address]  Ambulatory Referral to Physical Therapy                     Assessment  Assessment details: Pt  Presents today s/p fall with L wrist Contusion  Pt  Has pain with wrist motion and lifting resistance  Pt  Has tenderness over the FCU tendon of the wrist   She has pain with wrist rotation  Pt  To benefit from protective wrist splint for activties  Treatment to include modalities, ROM, STM, stretching and progressive strengthening  Pt  L hand is weak  With ADLs  Impairments: abnormal or restricted ROM, impaired physical strength, lacks appropriate home exercise program and pain with function  Functional limitations: Pt  has pain with rotation, typing, lifting and gripping with L hand  Understanding of Dx/Px/POC: good   Prognosis: good    Goals  STG( 3 visits)  1  Compliant with splint/HEP  2  Reduce pain to less than 6/10 with function  LTG( 6 visits or discharge)1  1  Pain free LUE use with ADLs  2  L  strength>20lbs for lifting  3  Improve FOTO score to predicted outcome or greater  Plan  Patient would benefit from: skilled occupational therapy  Planned modality interventions: cryotherapy and thermotherapy: hydrocollator packs  Planned therapy interventions: joint mobilization, manual therapy, home exercise program, therapeutic exercise, therapeutic activities, stretching, strengthening, fine motor coordination training and orthotic fitting/training  Frequency: 2x week  Duration in visits: 2  Duration in weeks: 6  Plan of Care beginning date: 5/10/2023  Plan of Care expiration date: 2023  Treatment plan discussed with: patient        Subjective Evaluation    History of Present Illness  Mechanism of injury: Pt   Ruiz Dhiraj at home around 23 using her L arm to stop herself  Since the fall she has had pain and discomfort with general use  Pt  Is referred to hand therapy for evaluation and treatment    Quality of life: good    Pain  Current pain ratin  At worst pain ratin  Quality: discomfort  Relieving factors: ice  Aggravating factors: lifting  Progression: improved    Social Support    Employment status: working ()  Hand dominance: left      Diagnostic Tests  X-ray: normal  Treatments  Current treatment: occupational therapy  Patient Goals  Patient goals for therapy: decreased pain, increased motion and independence with ADLs/IADLs          Objective     Tenderness     Additional Tenderness Details  TTP over the FCR    Neurological Testing     Sensation     Wrist/Hand   Left   Intact: light touch    Active Range of Motion     Left Wrist   Wrist flexion: 55 degrees   Wrist extension: 60 degrees   Radial deviation: 10 degrees   Ulnar deviation: 20 degrees     Left Thumb   Opposition: Full opposition    Additional Active Range of Motion Details  Full composite fist     Strength/Myotome Testing     Left Wrist/Hand      (2nd hand position)     Trial 1: 0    Thumb Strength  Key/Lateral Pinch     Trial 1: 5    Right Wrist/Hand      (2nd hand position)     Trial 1: 20    Thumb Strength   Key/Lateral Pinch     Trial 1: 15             Precautions: Standard      Manuals 5/10             IE 45'            Grassalbador L             MFR wrist flexors             Extrinsic stretches             Neuro Re-Ed                          HEP- splint, wrist stretches reviewed                                                                             Ther Ex             Wrist PROM                                                                                                        Ther Activity                                       Gait Training                                       Modalities              L 8m

## 2023-05-17 ENCOUNTER — OFFICE VISIT (OUTPATIENT)
Dept: FAMILY MEDICINE CLINIC | Facility: HOSPITAL | Age: 53
End: 2023-05-17

## 2023-05-17 VITALS
DIASTOLIC BLOOD PRESSURE: 120 MMHG | HEART RATE: 77 BPM | BODY MASS INDEX: 30.19 KG/M2 | WEIGHT: 181.2 LBS | SYSTOLIC BLOOD PRESSURE: 182 MMHG | OXYGEN SATURATION: 99 % | HEIGHT: 65 IN | TEMPERATURE: 97.7 F

## 2023-05-17 DIAGNOSIS — M70.51 INFRAPATELLAR BURSITIS OF RIGHT KNEE: ICD-10-CM

## 2023-05-17 DIAGNOSIS — S89.91XD KNEE INJURY, RIGHT, SUBSEQUENT ENCOUNTER: Primary | ICD-10-CM

## 2023-05-17 DIAGNOSIS — I10 ESSENTIAL HYPERTENSION: ICD-10-CM

## 2023-05-17 RX ORDER — LISINOPRIL 10 MG/1
10 TABLET ORAL DAILY
Qty: 30 TABLET | Refills: 3 | Status: SHIPPED | OUTPATIENT
Start: 2023-05-17

## 2023-05-17 NOTE — PROGRESS NOTES
"Name: Nadine Severance      : 1970      MRN: [id number]  Encounter Provider: Junie Dobson MD  Encounter Date: 2023   Encounter department: [facility]      1  Knee injury, right, subsequent encounter  -     XR knee 4+ vw right injury; Future; Expected date: 2023    2  Infrapatellar bursitis of right knee  Assessment & Plan:  Sample Voltaren gel given to use BID      3  BMI 30 0-30 9,adult    4  Essential hypertension  -     lisinopril (ZESTRIL) 10 mg tablet; Take 1 tablet (10 mg total) by mouth daily         Subjective      Visit for follow up knee pain from fall a month ago  Knees were numb afterward and now are starting to be painful    L wrist is doing better with PT and brace    Taking Advil at night mostly     Using SalonPas with some benefit    Review of Systems   Constitutional: Negative for unexpected weight change  Respiratory: Negative  Cardiovascular: Negative  Gastrointestinal: Negative  Musculoskeletal: Positive for arthralgias, gait problem and joint swelling  Hematological: Negative  All other systems reviewed and are negative  Current Outpatient Medications on File Prior to Visit   Medication Sig   • ALPRAZolam (XANAX) 0 5 mg tablet Take 1 tablet (0 5 mg total) by mouth 3 (three) times a day as needed for anxiety   • b complex vitamins tablet Take 1 tablet by mouth daily   • Calcium Carb-Cholecalciferol (CALCIUM + D3) 600-200 MG-UNIT TABS Take by mouth   • Multiple Vitamin (MULTI-VITAMIN DAILY) TABS Take by mouth   • escitalopram (LEXAPRO) 5 mg tablet Take 1 tablet (5 mg total) by mouth daily (Patient not taking: Reported on 2023)       Objective     BP (!) 182/120 (Patient Position: Sitting, Cuff Size: Standard)   Pulse 77   Temp 97 7 °F (36 5 °C) (Tympanic)   Ht 5' 5\" (1 651 m)   Wt 82 2 kg (181 lb 3 2 oz)   SpO2 99%   BMI 30 15 kg/m²     Physical Exam  Vitals reviewed     Cardiovascular:      " Rate and Rhythm: Normal rate and regular rhythm  Pulses: Normal pulses  Heart sounds: Normal heart sounds  Pulmonary:      Effort: Pulmonary effort is normal       Breath sounds: Normal breath sounds  Musculoskeletal:      Right lower leg: Swelling and tenderness present  No edema  Left lower leg: No edema  Neurological:      Mental Status: She is oriented to person, place, and time         Betsy Hammer MD

## 2023-05-19 ENCOUNTER — OFFICE VISIT (OUTPATIENT)
Dept: OCCUPATIONAL THERAPY | Facility: CLINIC | Age: 53
End: 2023-05-19

## 2023-05-19 DIAGNOSIS — S60.212A CONTUSION OF LEFT WRIST, INITIAL ENCOUNTER: Primary | ICD-10-CM

## 2023-05-19 NOTE — PROGRESS NOTES
Daily Note     Today's date: 2023  Patient name: Yodit Tate  : 1970  MRN: [id number]  Referring provider: Marylou Bautista  Dx:   Encounter Diagnosis     ICD-10-CM    1  Contusion of left wrist, initial encounter  S60 212A                      Subjective: My wrist is feeling better, I don't have the pain that I was having  Objective: See treatment diary below      Assessment: Tolerated treatment well  Patient has less pain overall, she is compliant with splint wear with activities  L hand fatigue with Therex  Plan: Continue per plan of care        Precautions: Standard      Manuals 5/10 5/19            IE 45'            Graston L  3m           MFR wrist flexors  4m           Extrinsic stretches  3m           Neuro Re-Ed                          HEP- splint, wrist stretches reviewed                                                                             Ther Ex             Wrist PROM  2m           Wrist curls  #2 3x10           therabar ecc twists  Red 3x10           Gripping   GPW 2x30                                                               Ther Activity                                       Gait Training                                       Modalities             MH L 8m 8m           CP post  5m

## 2023-05-20 ENCOUNTER — HOSPITAL ENCOUNTER (OUTPATIENT)
Dept: RADIOLOGY | Facility: HOSPITAL | Age: 53
Discharge: HOME | End: 2023-05-20

## 2023-05-20 DIAGNOSIS — S89.91XD KNEE INJURY, RIGHT, SUBSEQUENT ENCOUNTER: ICD-10-CM

## 2023-05-24 ENCOUNTER — OFFICE VISIT (OUTPATIENT)
Dept: OCCUPATIONAL THERAPY | Facility: CLINIC | Age: 53
End: 2023-05-24

## 2023-05-24 DIAGNOSIS — S60.212A CONTUSION OF LEFT WRIST, INITIAL ENCOUNTER: Primary | ICD-10-CM

## 2023-05-24 NOTE — PROGRESS NOTES
Daily Note     Today's date: 2023  Patient name: Tena Rodriguez  : 1970  MRN: [id number]  Referring provider: Venu Funez  Dx:   Encounter Diagnosis     ICD-10-CM    1  Contusion of left wrist, initial encounter  S60 815A                      Subjective: I wear the brace less, I feel the weakness and pain when I lift heavier things  Objective: See treatment diary below      Assessment: Tolerated treatment well  Patient has reduced symptoms since beginning therapy  She wears her wrist support with heavy hand use activities, and typing  Pt  Continues with mild weakness with gripping  Due to high co pay  Pt  Will discontinue therapy at this time and continue with self directed tx plan independently  She will return to department PR[facility] if symptoms persist         Plan: Continue with tx PRN       Precautions: Standard      Manuals 5/10 5/19 5/24           IE 45'            Graston L  3m 3m          MFR wrist flexors  4m 4m          Extrinsic stretches  3m 3m          Neuro Re-Ed                          HEP- splint, wrist stretches reviewed                                                                             Ther Ex             Wrist PROM  2m 2m          Wrist curls  #2 3x10 #2 3x10          therabar ecc twists  Red 3x10 Red 3x10          Gripping   GPW 2x30 GPW 2x30                                                              Ther Activity                                       Gait Training                                       Modalities             MH L 8m 8m 8m          CP post  5m 5m

## 2023-05-31 ENCOUNTER — TELEPHONE (OUTPATIENT)
Dept: FAMILY MEDICINE CLINIC | Facility: HOSPITAL | Age: 53
End: 2023-05-31

## 2023-05-31 NOTE — TELEPHONE ENCOUNTER
Patient has been using Salonpas for her knees  Last night applied at 10:30pm   At 1am, she woke with a burning sensation on her knees  The skin was red  She washed it off and applied neosporin  Area is still red and sensitive  Asking what she could use to help calm down the area?

## 2023-06-12 ENCOUNTER — OFFICE VISIT (OUTPATIENT)
Dept: FAMILY MEDICINE CLINIC | Facility: HOSPITAL | Age: 53
End: 2023-06-12
Payer: COMMERCIAL

## 2023-06-12 VITALS
OXYGEN SATURATION: 99 % | HEART RATE: 84 BPM | DIASTOLIC BLOOD PRESSURE: 84 MMHG | WEIGHT: 177.6 LBS | SYSTOLIC BLOOD PRESSURE: 124 MMHG | HEIGHT: 65 IN | TEMPERATURE: 97.2 F | BODY MASS INDEX: 29.59 KG/M2

## 2023-06-12 DIAGNOSIS — M70.51 INFRAPATELLAR BURSITIS OF RIGHT KNEE: ICD-10-CM

## 2023-06-12 DIAGNOSIS — E28.2 POLYCYSTIC OVARIAN SYNDROME: ICD-10-CM

## 2023-06-12 DIAGNOSIS — I10 ESSENTIAL HYPERTENSION: Primary | ICD-10-CM

## 2023-06-12 PROCEDURE — 99213 OFFICE O/P EST LOW 20 MIN: CPT | Performed by: FAMILY MEDICINE

## 2023-06-12 NOTE — PROGRESS NOTES
"Name: Nidia Klinefelter      : 1970      MRN: [id number]  Encounter Provider: Marlen Macias MD  Encounter Date: 2023   Encounter department: [facility]  Essential hypertension  Assessment & Plan:  Good response with Lisinopril, may go to 1/2 tab if consistently low readings by   Polycystic ovarian syndrome  Comments:  October visit with Dr Karel Trejo    3  Infrapatellar bursitis of right knee  Assessment & Plan:  Further improvement with home exercises      4  BMI 29 0-29 9,adult         Subjective      3 week follow up  Good progress with R knee  High PT copay, had just a few visits  Wearing splint L wrist     No problem with Lisinopril  BP's are good  Starting to walk more    Review of Systems   Constitutional: Positive for unexpected weight change  Respiratory: Negative  Cardiovascular: Negative  Musculoskeletal: Positive for arthralgias and joint swelling  Neurological: Negative  Psychiatric/Behavioral: Negative  All other systems reviewed and are negative        Current Outpatient Medications on File Prior to Visit   Medication Sig   • ALPRAZolam (XANAX) 0 5 mg tablet Take 1 tablet (0 5 mg total) by mouth 3 (three) times a day as needed for anxiety   • b complex vitamins tablet Take 1 tablet by mouth daily   • Calcium Carb-Cholecalciferol (CALCIUM + D3) 600-200 MG-UNIT TABS Take by mouth   • lisinopril (ZESTRIL) 10 mg tablet Take 1 tablet (10 mg total) by mouth daily   • Multiple Vitamin (MULTI-VITAMIN DAILY) TABS Take by mouth   • escitalopram (LEXAPRO) 5 mg tablet Take 1 tablet (5 mg total) by mouth daily (Patient not taking: Reported on 2023)       Objective     /84 (BP Location: Left arm, Patient Position: Sitting, Cuff Size: Large)   Pulse 84   Temp (!) 97 2 °F (36 2 °C) (Tympanic)   Ht 5' 5\" (1 651 m)   Wt 80 6 kg (177 lb 9 6 oz)   SpO2 99%   BMI 29 55 kg/m²     Physical Exam  Vitals and " nursing note reviewed  Constitutional:       Appearance: Normal appearance  Neck:      Vascular: No carotid bruit  Cardiovascular:      Rate and Rhythm: Normal rate and regular rhythm  Heart sounds: Normal heart sounds  Musculoskeletal:      Right lower leg: No edema  Left lower leg: No edema  Neurological:      Mental Status: She is alert and oriented to person, place, and time         Danelle Rodrigues MD

## 2023-06-27 ENCOUNTER — OFFICE VISIT (OUTPATIENT)
Dept: URGENT CARE | Facility: CLINIC | Age: 53
End: 2023-06-27
Payer: COMMERCIAL

## 2023-06-27 VITALS
HEART RATE: 84 BPM | RESPIRATION RATE: 18 BRPM | OXYGEN SATURATION: 98 % | TEMPERATURE: 99.2 F | SYSTOLIC BLOOD PRESSURE: 142 MMHG | DIASTOLIC BLOOD PRESSURE: 72 MMHG

## 2023-06-27 DIAGNOSIS — J30.9 ALLERGIC RHINITIS, UNSPECIFIED SEASONALITY, UNSPECIFIED TRIGGER: Primary | ICD-10-CM

## 2023-06-27 PROCEDURE — 99213 OFFICE O/P EST LOW 20 MIN: CPT | Performed by: FAMILY MEDICINE

## 2023-06-27 NOTE — PROGRESS NOTES
[facility]        NAME: Lucien Murphy is a 46 y o  female  : 1970    MRN: [id number]  DATE: 2023  TIME: 7:57 PM    Assessment and Plan   Allergic rhinitis, unspecified seasonality, unspecified trigger [J30 9]  1  Allergic rhinitis, unspecified seasonality, unspecified trigger              Patient Instructions       Follow up with PCP in 3-5 days  Proceed to  ER if symptoms worsen  Chief Complaint     Chief Complaint   Patient presents with   • Nasal Drainage     Pt has had a runny nose starting yesterday  States she has been smelling Drano she poured into her drain  Believes that her symptoms are due to inhaling Drano         History of Present Illness       43-year-old female presenting with 3-day history of increased runny nose and nasal drainage  She believes this may be due to the smell of Drano coming out of her shower drainage pipes after cleaning her bathroom this past weekend  Denies any headaches, lightheadedness or dizziness  Denies any loss of consciousness  Review of Systems   Review of Systems   Constitutional: Negative  HENT: Positive for congestion and rhinorrhea  Eyes: Negative  Respiratory: Negative  Cardiovascular: Negative  Gastrointestinal: Negative  Genitourinary: Negative  Skin: Negative  Allergic/Immunologic: Negative  Neurological: Negative  Hematological: Negative  Psychiatric/Behavioral: Negative            Current Medications       Current Outpatient Medications:   •  ALPRAZolam (XANAX) 0 5 mg tablet, Take 1 tablet (0 5 mg total) by mouth 3 (three) times a day as needed for anxiety, Disp: 30 tablet, Rfl: 1  •  b complex vitamins tablet, Take 1 tablet by mouth daily, Disp: , Rfl:   •  Calcium Carb-Cholecalciferol (CALCIUM + D3) 600-200 MG-UNIT TABS, Take by mouth, Disp: , Rfl:   •  escitalopram (LEXAPRO) 5 mg tablet, Take 1 tablet (5 mg total) by mouth daily (Patient not taking: Reported on 2023), Disp: 30 tablet, Rfl: 1  •  lisinopril (ZESTRIL) 10 mg tablet, Take 1 tablet (10 mg total) by mouth daily, Disp: 30 tablet, Rfl: 3  •  Multiple Vitamin (MULTI-VITAMIN DAILY) TABS, Take by mouth, Disp: , Rfl:     Current Allergies     Allergies as of 06/27/2023 - Reviewed 06/12/2023   Allergen Reaction Noted   • Amoxicillin  04/21/2012   • Clarithromycin  04/21/2012   • Influenza vaccines  09/25/2013   • Naproxen  03/26/2014            The following portions of the patient's history were reviewed and updated as appropriate: allergies, current medications, past family history, past medical history, past social history, past surgical history and problem list      Past Medical History:   Diagnosis Date   • Anxiety    • Anxiety disorder    • Fibroid    • Hirsutism    • History of IBS    • Irritable bowel syndrome    • Polycystic ovarian syndrome        Past Surgical History:   Procedure Laterality Date   • EGD     • MOUTH SURGERY     • NO PAST SURGERIES         Family History   Problem Relation Age of Onset   • Arthritis Mother    • Depression Mother    • Hypertension Mother    • Thyroid disease Mother    • Crohn's disease Father    • Diabetes Father    • Heart disease Father    • Hypertension Father    • No Known Problems Maternal Grandmother    • No Known Problems Maternal Grandfather    • No Known Problems Paternal Grandmother    • No Known Problems Paternal Grandfather    • Uterine cancer Maternal Aunt         70's   • No Known Problems Maternal Aunt    • No Known Problems Maternal Aunt    • No Known Problems Maternal Aunt    • Breast cancer Cousin         Unknown age   • Breast cancer Cousin         Unknown age   • Breast cancer Cousin         29's   • Cancer Family    • Lung cancer Maternal Uncle         Unknown age   • Colon cancer Neg Hx          Medications have been verified  Objective   /72   Pulse 84   Temp 99 2 °F (37 3 °C) (Oral)   Resp 18   SpO2 98%   No LMP recorded   Patient is perimenopausal  Physical Exam     Physical Exam  Vitals and nursing note reviewed  Constitutional:       Appearance: She is well-developed  HENT:      Head: Normocephalic  Nose: Rhinorrhea present  No congestion  Eyes:      Pupils: Pupils are equal, round, and reactive to light  Cardiovascular:      Rate and Rhythm: Normal rate and regular rhythm  Pulmonary:      Effort: Pulmonary effort is normal    Musculoskeletal:         General: Normal range of motion  Skin:     General: Skin is warm and dry  Neurological:      Mental Status: She is alert and oriented to person, place, and time

## 2023-06-28 ENCOUNTER — TELEPHONE (OUTPATIENT)
Dept: FAMILY MEDICINE CLINIC | Facility: HOSPITAL | Age: 53
End: 2023-06-28

## 2023-06-28 NOTE — TELEPHONE ENCOUNTER
Pt LM on VM that she had had a problem with cleaning her sink and breathing in fumes  She went to Urgent Care but still has some burning in her chest   Is there anything else she should do?

## 2023-06-28 NOTE — TELEPHONE ENCOUNTER
Patient called back and is wondering if Dr Carlota Toledo has a response yet to message from this morning

## 2023-08-01 ENCOUNTER — OFFICE VISIT (OUTPATIENT)
Dept: FAMILY MEDICINE CLINIC | Facility: HOSPITAL | Age: 53
End: 2023-08-01
Payer: COMMERCIAL

## 2023-08-01 VITALS
OXYGEN SATURATION: 98 % | HEIGHT: 65 IN | SYSTOLIC BLOOD PRESSURE: 148 MMHG | TEMPERATURE: 97.7 F | BODY MASS INDEX: 29.42 KG/M2 | WEIGHT: 176.6 LBS | DIASTOLIC BLOOD PRESSURE: 84 MMHG | HEART RATE: 84 BPM

## 2023-08-01 DIAGNOSIS — F43.21 GRIEF REACTION: ICD-10-CM

## 2023-08-01 DIAGNOSIS — I10 ESSENTIAL HYPERTENSION: Primary | ICD-10-CM

## 2023-08-01 DIAGNOSIS — F41.9 ANXIETY: ICD-10-CM

## 2023-08-01 DIAGNOSIS — E78.00 PURE HYPERCHOLESTEROLEMIA: ICD-10-CM

## 2023-08-01 DIAGNOSIS — Z12.83 SCREENING FOR MALIGNANT NEOPLASM OF SKIN: ICD-10-CM

## 2023-08-01 DIAGNOSIS — E28.2 POLYCYSTIC OVARIAN SYNDROME: ICD-10-CM

## 2023-08-01 PROBLEM — H00.011 HORDEOLUM EXTERNUM OF RIGHT UPPER EYELID: Status: RESOLVED | Noted: 2019-01-18 | Resolved: 2023-08-01

## 2023-08-01 PROCEDURE — 99214 OFFICE O/P EST MOD 30 MIN: CPT | Performed by: FAMILY MEDICINE

## 2023-08-01 RX ORDER — ALPRAZOLAM 0.5 MG/1
0.5 TABLET ORAL 3 TIMES DAILY PRN
Qty: 30 TABLET | Refills: 1 | Status: SHIPPED | OUTPATIENT
Start: 2023-08-01

## 2023-08-01 NOTE — PROGRESS NOTES
Name: Maciel Escobedo      : 1970      MRN: [id number]  Encounter Provider: Teressa Koo MD  Encounter Date: 2023   Encounter department: [address]     1. Essential hypertension  -     CBC and Platelet; Future; Expected date: 2024  -     Comprehensive metabolic panel; Future; Expected date: 2024  -     TSH, 3rd generation with Free T4 reflex; Future; Expected date: 2024  -     CBC and Platelet  -     Comprehensive metabolic panel  -     TSH, 3rd generation with Free T4 reflex    2. Polycystic ovarian syndrome  -     HEMOGLOBIN A1C W/ EAG ESTIMATION; Future; Expected date: 2024  -     HEMOGLOBIN A1C W/ EAG ESTIMATION    3. BMI 29.0-29.9,adult    4. Pure hypercholesterolemia  -     Lipid Panel with Direct LDL reflex; Future; Expected date: 2024  -     Lipid Panel with Direct LDL reflex    5. Grief reaction  Assessment & Plan:  Handling issues better      6. Anxiety  -     ALPRAZolam (XANAX) 0.5 mg tablet; Take 1 tablet (0.5 mg total) by mouth 3 (three) times a day as needed for anxiety    7. Screening for malignant neoplasm of skin  -     Ambulatory Referral to Dermatology; Future         Subjective     6 month follow up. No recent injury or illness    Some home issues, A/C went out    Home BP's all in 120-130 range  Would like to cut Lisinopril to half but will maintain for now    Gyn check next month- some perimenopause symptoms    Would like Derm referral for skin screen    Review of Systems   Constitutional: Negative for fatigue and unexpected weight change. Respiratory: Negative. Cardiovascular: Negative. Gastrointestinal: Negative. Genitourinary: Negative. Musculoskeletal: Negative. Neurological: Negative. Psychiatric/Behavioral: Negative. All other systems reviewed and are negative.       Past Medical History:   Diagnosis Date   • Anxiety    • Anxiety disorder    • Fibroid    • Hirsutism    • History of IBS    • Irritable bowel syndrome    • Polycystic ovarian syndrome      Past Surgical History:   Procedure Laterality Date   • EGD     • MOUTH SURGERY     • NO PAST SURGERIES       Family History   Problem Relation Age of Onset   • Arthritis Mother    • Depression Mother    • Hypertension Mother    • Thyroid disease Mother    • Crohn's disease Father    • Diabetes Father    • Heart disease Father    • Hypertension Father    • No Known Problems Maternal Grandmother    • No Known Problems Maternal Grandfather    • No Known Problems Paternal Grandmother    • No Known Problems Paternal Grandfather    • Uterine cancer Maternal Aunt         70's   • No Known Problems Maternal Aunt    • No Known Problems Maternal Aunt    • No Known Problems Maternal Aunt    • Breast cancer Cousin         Unknown age   • Breast cancer Cousin         Unknown age   • Breast cancer Cousin         29's   • Cancer Family    • Lung cancer Maternal Uncle         Unknown age   • Colon cancer Neg Hx      Social History     Socioeconomic History   • Marital status: Single     Spouse name: None   • Number of children: None   • Years of education: None   • Highest education level: None   Occupational History   • None   Tobacco Use   • Smoking status: Never   • Smokeless tobacco: Never   Vaping Use   • Vaping Use: Never used   Substance and Sexual Activity   • Alcohol use: Yes     Comment: rarely   • Drug use: No   • Sexual activity: Not Currently     Partners: Male     Birth control/protection: Pill   Other Topics Concern   • None   Social History Narrative    Caffeine use     Amish affiliation Religion    Uses safety equipment - seat belts     Social Determinants of Health     Financial Resource Strain: Not on file   Food Insecurity: Not on file   Transportation Needs: Not on file   Physical Activity: Not on file   Stress: Not on file   Social Connections: Not on file   Intimate Partner Violence: Not on file   Housing Stability: Not on file     Current Outpatient Medications on File Prior to Visit   Medication Sig   • b complex vitamins tablet Take 1 tablet by mouth daily   • Calcium Carb-Cholecalciferol (CALCIUM + D3) 600-200 MG-UNIT TABS Take by mouth   • lisinopril (ZESTRIL) 10 mg tablet Take 1 tablet (10 mg total) by mouth daily   • Multiple Vitamin (MULTI-VITAMIN DAILY) TABS Take by mouth   • [DISCONTINUED] ALPRAZolam (XANAX) 0.5 mg tablet Take 1 tablet (0.5 mg total) by mouth 3 (three) times a day as needed for anxiety   • [DISCONTINUED] escitalopram (LEXAPRO) 5 mg tablet Take 1 tablet (5 mg total) by mouth daily (Patient not taking: Reported on 5/17/2023)     Allergies   Allergen Reactions   • Amoxicillin      Other reaction(s): GI Upset  Reaction Date: 12Sep2011;    • Clarithromycin      Reaction Date: 12Sep2011;    • Influenza Vaccines    • Naproxen      Other reaction(s): GI Upset     Immunization History   Administered Date(s) Administered   • COVID-19 PFIZER VACCINE 0.3 ML IM 04/08/2021, 05/01/2021, 04/29/2022   • INFLUENZA 11/13/2021   • Influenza, injectable, quadrivalent, preservative free 0.5 mL 11/21/2020   • Tdap 04/28/2014       Objective     /84 (BP Location: Left arm, Patient Position: Sitting, Cuff Size: Standard)   Pulse 84   Temp 97.7 °F (36.5 °C) (Tympanic)   Ht 5' 5" (1.651 m)   Wt 80.1 kg (176 lb 9.6 oz)   SpO2 98%   BMI 29.39 kg/m²     Physical Exam  Vitals and nursing note reviewed. Constitutional:       Appearance: Normal appearance. Cardiovascular:      Rate and Rhythm: Normal rate and regular rhythm. Pulses: Normal pulses. Heart sounds: Normal heart sounds. Pulmonary:      Effort: Pulmonary effort is normal.      Breath sounds: Normal breath sounds. Musculoskeletal:      Right lower leg: No edema. Left lower leg: No edema. Neurological:      Mental Status: She is oriented to person, place, and time.    Psychiatric:         Mood and Affect: Mood normal. David Tobin MD

## 2023-09-05 DIAGNOSIS — I10 ESSENTIAL HYPERTENSION: ICD-10-CM

## 2023-09-05 RX ORDER — LISINOPRIL 10 MG/1
10 TABLET ORAL DAILY
Qty: 30 TABLET | Refills: 3 | Status: SHIPPED | OUTPATIENT
Start: 2023-09-05

## 2023-10-05 ENCOUNTER — ANNUAL EXAM (OUTPATIENT)
Dept: GYNECOLOGY | Facility: CLINIC | Age: 53
End: 2023-10-05
Payer: COMMERCIAL

## 2023-10-05 VITALS
DIASTOLIC BLOOD PRESSURE: 80 MMHG | HEIGHT: 65 IN | BODY MASS INDEX: 29.32 KG/M2 | WEIGHT: 176 LBS | SYSTOLIC BLOOD PRESSURE: 138 MMHG

## 2023-10-05 DIAGNOSIS — Z12.31 ENCOUNTER FOR SCREENING MAMMOGRAM FOR BREAST CANCER: ICD-10-CM

## 2023-10-05 DIAGNOSIS — D21.9 FIBROIDS: ICD-10-CM

## 2023-10-05 DIAGNOSIS — N95.1 PERIMENOPAUSE: ICD-10-CM

## 2023-10-05 DIAGNOSIS — Z01.419 ENCOUNTER FOR ANNUAL ROUTINE GYNECOLOGICAL EXAMINATION: Primary | ICD-10-CM

## 2023-10-05 PROCEDURE — S0612 ANNUAL GYNECOLOGICAL EXAMINA: HCPCS | Performed by: OBSTETRICS & GYNECOLOGY

## 2023-10-05 NOTE — PROGRESS NOTES
Assessment/Plan:    pap is up to date    Perimenopause - she will keep track of her cycles. She is aware to call if they become heavy, last more than a week or occur more than once a month. She will take Advil and Tylenol for her cramps. I recommended that she take Advil with food. Fibroids-these have been stable. Would recheck this year given the amount of fibroids that she has and her increase in dysmenorrhea.    mammogram reviewed with her including breast density. RX given for later this month  Discussed self breast exams    colon cancer screening - colonoscopy in 2021, recall in 10 years    discussed preventive care, regular exercise and a healthy diet      No problem-specific Assessment & Plan notes found for this encounter. Diagnoses and all orders for this visit:    Encounter for annual routine gynecological examination    Encounter for screening mammogram for breast cancer  -     Mammo screening bilateral w 3d & cad; Future    Perimenopause    Fibroids  -     US pelvis complete w transvaginal; Future          Subjective:      Patient ID: Socrates Devries is a 48 y.o. female. Patient here for yearly. She has multiple fibroids and on her last ultrasound from November 2022, they had all been stable or smaller. She is skipping menstrual cycles. When she does get 1, she bleeds for 5 to 6 days and sometimes as little as 2 days. She skipped July and most of August.  The longest she has gone without a cycle is 2 months. The flow is heavy for about 2 days and she changes her protection every 2 hours, it is then light and she only requires a light pad. She does notice more cramps than in the past.  They can be quite uncomfortable and she takes Advil.     Normal Pap, negative HPV in September 2022  Normal 3D mammogram last October with scattered fibroglandular densities and average risk          The following portions of the patient's history were reviewed and updated as appropriate: allergies, current medications, past family history, past medical history, past social history, past surgical history and problem list.    Review of Systems   Constitutional: Negative. Gastrointestinal: Negative. Genitourinary: Positive for menstrual problem. Dysmenorrhea         Objective:      /80 (BP Location: Right arm, Patient Position: Sitting)   Ht 5' 5" (1.651 m)   Wt 79.8 kg (176 lb)   BMI 29.29 kg/m²          Physical Exam  Vitals reviewed. Constitutional:       Appearance: She is well-developed. Neck:      Thyroid: No thyromegaly. Trachea: No tracheal deviation. Cardiovascular:      Rate and Rhythm: Normal rate and regular rhythm. Pulmonary:      Effort: Pulmonary effort is normal.      Breath sounds: Normal breath sounds. Chest:   Breasts:     Breasts are symmetrical.      Right: No inverted nipple, mass, nipple discharge, skin change or tenderness. Left: No inverted nipple, mass, nipple discharge, skin change or tenderness. Abdominal:      General: There is no distension. Palpations: Abdomen is soft. There is no mass. Tenderness: There is no abdominal tenderness. Genitourinary:     Labia:         Right: No rash, tenderness, lesion or injury. Left: No rash, tenderness, lesion or injury. Vagina: Normal.      Cervix: No cervical motion tenderness, discharge or friability. Adnexa:         Right: No mass, tenderness or fullness. Left: No mass, tenderness or fullness.         Rectum: Normal.

## 2023-10-11 ENCOUNTER — TELEPHONE (OUTPATIENT)
Dept: FAMILY MEDICINE CLINIC | Facility: HOSPITAL | Age: 53
End: 2023-10-11

## 2023-10-11 DIAGNOSIS — J02.8 ACUTE BACTERIAL PHARYNGITIS: Primary | ICD-10-CM

## 2023-10-11 DIAGNOSIS — B96.89 ACUTE BACTERIAL PHARYNGITIS: Primary | ICD-10-CM

## 2023-10-11 RX ORDER — AZITHROMYCIN 250 MG/1
TABLET, FILM COATED ORAL
Qty: 6 TABLET | Refills: 0 | Status: SHIPPED | OUTPATIENT
Start: 2023-10-11 | End: 2023-10-15

## 2023-10-11 NOTE — TELEPHONE ENCOUNTER
Patient has sore throat - possibly strep - no appointments available today - patient asking for zpack to be called in for her    pcb

## 2023-10-13 ENCOUNTER — TELEPHONE (OUTPATIENT)
Dept: FAMILY MEDICINE CLINIC | Facility: HOSPITAL | Age: 53
End: 2023-10-13

## 2023-10-13 DIAGNOSIS — J01.10 SUBACUTE FRONTAL SINUSITIS: Primary | ICD-10-CM

## 2023-10-13 RX ORDER — PREDNISONE 10 MG/1
TABLET ORAL
Qty: 16 TABLET | Refills: 0 | Status: SHIPPED | OUTPATIENT
Start: 2023-10-13

## 2023-10-13 NOTE — TELEPHONE ENCOUNTER
The z pack that you called in helped with the sore throat. However, she now has a sinus infection---stuffy and coughing (discharge is clear). Taking advil cold and sinus. Not working. Are you able to suggest something for her to take or call something in for her?

## 2023-10-16 ENCOUNTER — OFFICE VISIT (OUTPATIENT)
Dept: FAMILY MEDICINE CLINIC | Facility: HOSPITAL | Age: 53
End: 2023-10-16
Payer: COMMERCIAL

## 2023-10-16 VITALS
HEIGHT: 65 IN | SYSTOLIC BLOOD PRESSURE: 136 MMHG | DIASTOLIC BLOOD PRESSURE: 74 MMHG | BODY MASS INDEX: 29.49 KG/M2 | WEIGHT: 177 LBS | HEART RATE: 80 BPM | TEMPERATURE: 98.8 F

## 2023-10-16 DIAGNOSIS — J06.9 UPPER RESPIRATORY TRACT INFECTION, UNSPECIFIED TYPE: Primary | ICD-10-CM

## 2023-10-16 PROCEDURE — 99213 OFFICE O/P EST LOW 20 MIN: CPT | Performed by: NURSE PRACTITIONER

## 2023-10-16 NOTE — PROGRESS NOTES
Name: Rishi Chávez      : 1970      MRN: [id number]  Encounter Provider: [facility]  Encounter Date: 10/16/2023   Encounter department: [address]     1. Upper respiratory tract infection, unspecified type      Symptoms gradually improving s/p zpack course  Advise she can continue OTC meds prn and consider use of nasal spray q HS. She is declining rx for prednisone as was previously prescribed       Subjective      Started with bad sore throat 6 days ago - this lasted 2 days and then head congestion developed. Symptoms have improved past few days. Completed zpack yesterday. She was rx'd prednisone 3 days ago but was afraid to take it with concern for side effects. Still with drainage/PND and tickly throat. Right ear is clogged like a cotton ball. She doesn't like to use nasal sprays. Did not do a covid test.         Review of Systems   Constitutional:  Negative for chills and fever. HENT:  Positive for ear pain (right ear clogged), postnasal drip and sore throat. Respiratory:  Positive for cough (dry). Negative for shortness of breath. Current Outpatient Medications on File Prior to Visit   Medication Sig    ALPRAZolam (XANAX) 0.5 mg tablet Take 1 tablet (0.5 mg total) by mouth 3 (three) times a day as needed for anxiety    b complex vitamins tablet Take 1 tablet by mouth daily    Calcium Carb-Cholecalciferol (CALCIUM + D3) 600-200 MG-UNIT TABS Take by mouth    lisinopril (ZESTRIL) 10 mg tablet TAKE ONE TABLET BY MOUTH EVERY DAY    Multiple Vitamin (MULTI-VITAMIN DAILY) TABS Take by mouth    predniSONE 10 mg tablet Take 4 tablets today, then three tablets daily for 2 days, two tablets daily for 2 days, then one tablet daily for 2 days.  (Patient not taking: Reported on 10/16/2023)    [] azithromycin (ZITHROMAX) 250 mg tablet Take 2 tablets today then 1 tablet daily x 4 days (Patient not taking: Reported on 10/16/2023) Objective     /74   Pulse 80   Temp 98.8 °F (37.1 °C)   Ht 5' 5" (1.651 m)   Wt 80.3 kg (177 lb)   BMI 29.45 kg/m²       Physical Exam  Vitals reviewed. Constitutional:       General: She is not in acute distress. Appearance: Normal appearance. HENT:      Head: Normocephalic. Right Ear: A middle ear effusion is present. Tympanic membrane is not erythematous. Left Ear: A middle ear effusion is present. Tympanic membrane is not erythematous. Mouth/Throat:      Mouth: Mucous membranes are moist.      Pharynx: Posterior oropharyngeal erythema present. No oropharyngeal exudate. Eyes:      General: No scleral icterus. Right eye: No discharge. Left eye: No discharge. Cardiovascular:      Rate and Rhythm: Normal rate and regular rhythm. Heart sounds: No murmur heard. Pulmonary:      Effort: Pulmonary effort is normal. No respiratory distress. Breath sounds: Normal breath sounds. Comments: No cough  Musculoskeletal:      Cervical back: Normal range of motion. Lymphadenopathy:      Cervical: No cervical adenopathy. Skin:     General: Skin is warm and dry. Neurological:      General: No focal deficit present. Mental Status: She is alert and oriented to person, place, and time.    Psychiatric:         Mood and Affect: Mood normal.         Behavior: Behavior normal.         TANVI Zamora

## 2023-10-20 ENCOUNTER — TELEPHONE (OUTPATIENT)
Dept: FAMILY MEDICINE CLINIC | Facility: HOSPITAL | Age: 53
End: 2023-10-20

## 2023-10-20 NOTE — TELEPHONE ENCOUNTER
Patient left the following message:    I was in the office on Monday and it was a follow up from a upper respiratory that I had last week. But my one ear, my ears were still clogged. I'm still having problems with my right ear. It still feels like there's a cotton ball in it. I can't seem to get it to pop. Still have some post nasal drip I'm taking. Allergy medication. I'm taking Advil cold and sinus, although not tons of the Advil right now, and I've tried a couple of different other things. The ear doesn't really hurt, but it was itching yesterday off and on. I just don't know if I should come back in and have it checked, if I should, if we need another course of antibiotics, or if I just need to let it go for a couple more days and see what happens. Kind of felt a little bit like it was going to pop last yesterday, but it did not and now it seems to be martinez. So I just don't know what to do next, is what I need to know, and I just don't want it to get too serious. But then again, I don't really want to see a specialist if there's no need to see a specialist, if this is just going to sooner or later resolve itself. But with ears, I kind of get a little bit, kind of get a little bit worried about them. So if somebody could give me a call back, let me know or talk to Doctor Russell Arzola and see what he thinks at this point. Like I said, they were itching a little bit yesterday off and on. I feel the fullness but I really don't have any major pain at this point. And they do crack when I swallow so and I have a ringing in my ears, but I had a ringing in my ears before this, but it seems a little worse than it was before. So if somebody could just give me a call back and let me know, maybe I should do. I've tried some steam. I've tried. I have a Isai Nasal Spray, although I'm not sure I'm worth using it correctly. I hate nasal sprays so but this is where I'm just asking.  I just curiosity at this point in time, what should my next step be at this point? My phone number. I'm at work, but my phone number? You can call me on my cell phone. I will answer. It's [phone] and if somebody could just get back to me and let me know as soon as possible, I'd appreciate it. Thank you.

## 2023-10-26 DIAGNOSIS — H69.91 DYSFUNCTION OF RIGHT EUSTACHIAN TUBE: Primary | ICD-10-CM

## 2023-11-11 ENCOUNTER — HOSPITAL ENCOUNTER (OUTPATIENT)
Dept: ULTRASOUND IMAGING | Facility: HOSPITAL | Age: 53
Discharge: HOME | End: 2023-11-11
Attending: OBSTETRICS & GYNECOLOGY
Payer: COMMERCIAL

## 2023-11-11 DIAGNOSIS — D21.9 FIBROIDS: ICD-10-CM

## 2023-11-11 PROCEDURE — 76856 US EXAM PELVIC COMPLETE: CPT

## 2023-11-11 PROCEDURE — 76830 TRANSVAGINAL US NON-OB: CPT

## 2023-11-24 ENCOUNTER — HOSPITAL ENCOUNTER (OUTPATIENT)
Dept: MAMMOGRAPHY | Facility: CLINIC | Age: 53
Discharge: HOME | End: 2023-11-24
Payer: COMMERCIAL

## 2023-11-24 VITALS — BODY MASS INDEX: 29.49 KG/M2 | WEIGHT: 177.03 LBS | HEIGHT: 65 IN

## 2023-11-24 DIAGNOSIS — Z12.31 ENCOUNTER FOR SCREENING MAMMOGRAM FOR BREAST CANCER: ICD-10-CM

## 2023-11-24 PROCEDURE — 77063 BREAST TOMOSYNTHESIS BI: CPT

## 2023-11-24 PROCEDURE — 77067 SCR MAMMO BI INCL CAD: CPT

## 2023-12-02 ENCOUNTER — NURSE TRIAGE (OUTPATIENT)
Dept: OTHER | Facility: OTHER | Age: 53
End: 2023-12-02

## 2023-12-03 NOTE — TELEPHONE ENCOUNTER
Reason for Disposition  • MILD rectal bleeding (more than just a few drops or streaks)    Answer Assessment - Initial Assessment Questions  1. APPEARANCE of BLOOD: "What color is it?" "Is it passed separately, on the surface of the stool, or mixed in with the stool?"       Bright red, on tissue when wiping, blood in water    2. AMOUNT: "How much blood was passed?"       Unable to estimate, but not enough to turn all water red    3. FREQUENCY: "How many times has blood been passed with the stools?"       One time    4. ONSET: "When was the blood first seen in the stools?" (Days or weeks)       Has happened in the past- just blood on the toilet paper but not in the toilet    5. DIARRHEA: "Is there also some diarrhea?" If Yes, ask: "How many diarrhea stools were passed in past 24 hours?"       no    6. CONSTIPATION: "Do you have constipation?" If Yes, ask: "How bad is it?"      Yes, having hard stools through the week but today was ok    7. RECURRENT SYMPTOMS: "Have you had blood in your stools before?" If Yes, ask: "When was the last time?" and "What happened that time?"       Blood on toilet paper before, not recently    8. BLOOD THINNERS: "Do you take any blood thinners?" (e.g., Coumadin/warfarin, Pradaxa/dabigatran, aspirin)      no    9.  OTHER SYMPTOMS: "Do you have any other symptoms?"  (e.g., abdominal pain, vomiting, dizziness, fever)      +abdominal pain, mild, possibly fibroids but sometimes a/w constipation    Protocols used: Rectal Bleeding-ADULT-

## 2023-12-03 NOTE — TELEPHONE ENCOUNTER
Regarding: Blood in stool  ----- Message from Kulwinder Lopez sent at 12/2/2023  7:32 PM EST -----  " I just had a bowel movement and there was blood in the toilet bowel and toilet paper.  Should I be concerned?"

## 2023-12-04 ENCOUNTER — OFFICE VISIT (OUTPATIENT)
Dept: FAMILY MEDICINE CLINIC | Facility: HOSPITAL | Age: 53
End: 2023-12-04
Payer: COMMERCIAL

## 2023-12-04 ENCOUNTER — TELEPHONE (OUTPATIENT)
Dept: FAMILY MEDICINE CLINIC | Facility: HOSPITAL | Age: 53
End: 2023-12-04

## 2023-12-04 VITALS
SYSTOLIC BLOOD PRESSURE: 128 MMHG | OXYGEN SATURATION: 98 % | HEART RATE: 86 BPM | HEIGHT: 65 IN | BODY MASS INDEX: 29.49 KG/M2 | DIASTOLIC BLOOD PRESSURE: 70 MMHG | TEMPERATURE: 97.2 F | WEIGHT: 177 LBS

## 2023-12-04 DIAGNOSIS — K62.5 BRBPR (BRIGHT RED BLOOD PER RECTUM): Primary | ICD-10-CM

## 2023-12-04 LAB — SL AMB POCT FECES OCC BLD: NORMAL

## 2023-12-04 PROCEDURE — 82270 OCCULT BLOOD FECES: CPT | Performed by: NURSE PRACTITIONER

## 2023-12-04 PROCEDURE — 99214 OFFICE O/P EST MOD 30 MIN: CPT | Performed by: NURSE PRACTITIONER

## 2023-12-04 RX ORDER — CALCIUM POLYCARBOPHIL 625 MG 625 MG/1
625 TABLET ORAL DAILY
COMMUNITY

## 2023-12-04 NOTE — TELEPHONE ENCOUNTER
Patient has an appointment with you tomorrow. Wanted to know if the appointment could wait until tomorrow or if patient should be seen today. Patient called over the weekend she was having rectal bleeding. She said she did have a small bm today and she didn't see blood but she did see a little blood when she wiped. Can patient wait until tomorrow or should she keep todays appt?

## 2023-12-04 NOTE — PROGRESS NOTES
Assessment/Plan:     Exam was normal with negative hemoccult. I suspect possible internal hemorrhoid. Given she has intermittent episodes of blood with wiping will send her to colorectal.      Diagnoses and all orders for this visit:    BRBPR (bright red blood per rectum)  -     Ambulatory Referral to Colorectal Surgery; Future  -     POCT hemoccult screening    Other orders  -     calcium polycarbophil (FIBERCON) 625 mg tablet; Take 625 mg by mouth daily          Subjective:     Patient ID: Alyssa Rahman is a 48 y.o. female. Started with rectal bleeding 3 days ago. Had BM and had blood on tissue and in the toilet. BRB. Unsure if in the stool itself. BM was not difficult to pass. Does have h/o constipation. Yesterday it occurred again and that BM was difficult to pass. BM was painful to pass. Rectum feels sore. Last colonoscopy 2 years ago which noted small hemorrhoid and diverticulum. Had lower abdominal pain yesterday. Does also have some LBP which sometimes occurs with strained Bms. Both feel better today. Today had 2 small Bms w/o any blood except a little on the tissue. She has had episodes of blood on toilet paper in the past but never blood in the toilet. Review of Systems   Constitutional:  Negative for unexpected weight change. Gastrointestinal:  Positive for abdominal pain, anal bleeding, constipation and rectal pain. Negative for abdominal distention, blood in stool, diarrhea, nausea and vomiting. The following portions of the patient's history were reviewed and updated as appropriate: allergies, current medications, past family history, past medical history, past social history, past surgical history and problem list.    Objective:  Vitals:    12/04/23 1555   BP: 128/70   Pulse: 86   Temp: (!) 97.2 °F (36.2 °C)   SpO2: 98%      Physical Exam  Vitals reviewed. Constitutional:       Appearance: Normal appearance.    Cardiovascular:      Rate and Rhythm: Normal rate and regular rhythm. Heart sounds: Normal heart sounds. No murmur heard. Pulmonary:      Effort: Pulmonary effort is normal.      Breath sounds: Normal breath sounds. Abdominal:      General: Bowel sounds are normal.      Palpations: Abdomen is soft. There is no hepatomegaly or splenomegaly. Tenderness: There is no abdominal tenderness. Genitourinary:     Rectum: Guaiac result negative. No mass, tenderness, external hemorrhoid or internal hemorrhoid. Normal anal tone. Skin:     General: Skin is warm and dry. Neurological:      Mental Status: She is alert and oriented to person, place, and time. Psychiatric:         Mood and Affect: Mood normal.         Behavior: Behavior normal.         Thought Content:  Thought content normal.         Judgment: Judgment normal.

## 2023-12-06 ENCOUNTER — TELEPHONE (OUTPATIENT)
Dept: FAMILY MEDICINE CLINIC | Facility: HOSPITAL | Age: 53
End: 2023-12-06

## 2023-12-06 NOTE — TELEPHONE ENCOUNTER
Patient was in to see you this week she said that she is still having lower back pain above buttocks. Feels like a knot   Wanted to know if she could get a muscle relaxer? Said Advil / aleve and heating packs just aren't working. [facility] in [place]. Patient also said she is also on bp meds incase you do send something.

## 2023-12-06 NOTE — TELEPHONE ENCOUNTER
Patient states this started Sunday after a heavy BM. Feels like it is a knot as feels better when she rubs it. Advised to keep up with the Advil and heat and supportive treatment, and if no better by Monday, to let us know.

## 2023-12-26 ENCOUNTER — OFFICE VISIT (OUTPATIENT)
Dept: FAMILY MEDICINE CLINIC | Facility: HOSPITAL | Age: 53
End: 2023-12-26
Payer: COMMERCIAL

## 2023-12-26 VITALS
DIASTOLIC BLOOD PRESSURE: 70 MMHG | TEMPERATURE: 96.5 F | HEIGHT: 65 IN | BODY MASS INDEX: 28.62 KG/M2 | HEART RATE: 72 BPM | SYSTOLIC BLOOD PRESSURE: 106 MMHG | WEIGHT: 171.8 LBS

## 2023-12-26 DIAGNOSIS — M79.676 PAIN OF GREAT TOE, UNSPECIFIED LATERALITY: ICD-10-CM

## 2023-12-26 DIAGNOSIS — K58.1 IRRITABLE BOWEL SYNDROME WITH CONSTIPATION: Primary | ICD-10-CM

## 2023-12-26 DIAGNOSIS — H69.91 DYSFUNCTION OF RIGHT EUSTACHIAN TUBE: ICD-10-CM

## 2023-12-26 DIAGNOSIS — K59.00 CONSTIPATION, UNSPECIFIED CONSTIPATION TYPE: ICD-10-CM

## 2023-12-26 PROBLEM — K58.9 IRRITABLE BOWEL SYNDROME: Status: ACTIVE | Noted: 2023-12-26

## 2023-12-26 PROCEDURE — 99214 OFFICE O/P EST MOD 30 MIN: CPT | Performed by: INTERNAL MEDICINE

## 2023-12-26 NOTE — ASSESSMENT & PLAN NOTE
Reassured pt that current s/sx sound like a flare of her IBS-C, urged high fiber diet and/or con't FiberCon and add stool softener OTC and find regimen that works for her -  urge daily  use as will take 3-4 days for max benefit when starts stool softener, also encouraged plenty of fluids and regular exercise, if no better OR if red flag GI symptoms occur pt was directed to call

## 2023-12-26 NOTE — PROGRESS NOTES
"Name: Sophia Sparks      : 1970      MRN: [id number]  Encounter Provider: Taylor Velazco DO  Encounter Date: 2023   Encounter department: [facility] PRIMARY CARE [address]     Assessment & Plan     1. Irritable bowel syndrome with constipation  Assessment & Plan:  Reassured pt that current s/sx sound like a flare of her IBS-C, urged high fiber diet and/or con't FiberCon and add stool softener OTC and find regimen that works for her -  urge daily  use as will take 3-4 days for max benefit when starts stool softener, also encouraged plenty of fluids and regular exercise, if no better OR if red flag GI symptoms occur pt was directed to call      2. Constipation, unspecified constipation type  Assessment & Plan:  Con't fiber supplement d/t inadequate dietary fiber intake, increase fluids and encouraged regular exercise, may take OTC stool softener daily for prevention, call with red flag GI symptoms OR if no better      3. Dysfunction of right eustachian tube  Comments:  Reassured no s/sx of infection or cerumen impaction - start OTC antihistamine daily - call with pain/drainage/F/C/resp symptoms OR if no better    4. Pain of great toe, unspecified laterality  Comments:  Toe pain B/L - reassured no sign of ingrown nail rather likely just irritation/rubbing from  narrow dress shoe - avoid trigger/shoe and can use Epsom salt soaks and Tylenol prn, call with new/worse symptoms/drainage/redness/F/C       Colonoscopy  - 10 yrs    Mammo     PAP     BW       Subjective      HPI Pt here for an acute visit    Pt with chronic constipation. She states this am things started to \"move around\". Last week Wed/Thurs she started with \"a gas bubble\" in LLQ and when she had BM it was hard with sense of incomplete evacuation. She was using FiberCon and increase her water.  She admits she wasn't eating much and went back to \"regular eating pattern\" and things started to improve.  She tried OTC " "\"generic stool softener\" for a few days w/o great benefit and felt more \"gasey\". She then started OTC Colace and felt better after 1 dose. She had a nml BM this am.  She currently notes no abd pain/N/V/blood in stool/black stools/F.      Had a URI in Oct and has had sense of fullness/\"not open\" in her R ear. Yesterday she started to have some crackling. She notes no actual pain/drainage/F/C/current cold symptoms.  She admits she does grind her teeth.     Lateral aspect of great toe is tender after wearing dress shoes for holidays. She is worried she has an ingrown toe nail as this seems to run in her family.  She notes no drainage/F/C.           Review of Systems   Constitutional:  Negative for chills and fever.   HENT:  Negative for congestion, ear discharge, ear pain and sore throat.    Eyes:  Negative for pain and visual disturbance.   Respiratory:  Negative for cough and shortness of breath.    Cardiovascular:  Negative for chest pain and palpitations.   Gastrointestinal:  Positive for constipation. Negative for abdominal pain, blood in stool, diarrhea, nausea and vomiting.   Genitourinary:  Negative for difficulty urinating and dysuria.   Skin:  Negative for color change, rash and wound.   Neurological:  Positive for headaches. Negative for dizziness.       Current Outpatient Medications on File Prior to Visit   Medication Sig    ALPRAZolam (XANAX) 0.5 mg tablet Take 1 tablet (0.5 mg total) by mouth 3 (three) times a day as needed for anxiety    b complex vitamins tablet Take 1 tablet by mouth daily    Calcium Carb-Cholecalciferol (CALCIUM + D3) 600-200 MG-UNIT TABS Take by mouth    calcium polycarbophil (FIBERCON) 625 mg tablet Take 625 mg by mouth daily    lisinopril (ZESTRIL) 10 mg tablet TAKE ONE TABLET BY MOUTH EVERY DAY    Multiple Vitamin (MULTI-VITAMIN DAILY) TABS Take by mouth    [DISCONTINUED] predniSONE 10 mg tablet Take 4 tablets today, then three tablets daily for 2 days, two tablets daily for 2 " "days, then one tablet daily for 2 days. (Patient not taking: Reported on 10/16/2023)       Objective     /70   Pulse 72   Temp (!) 96.5 °F (35.8 °C) (Tympanic)   Ht 5' 5\" (1.651 m)   Wt 77.9 kg (171 lb 12.8 oz)   BMI 28.59 kg/m²     Physical Exam  Vitals and nursing note reviewed.   Constitutional:       General: She is not in acute distress.     Appearance: She is well-developed. She is not ill-appearing.   HENT:      Head: Normocephalic and atraumatic.      Right Ear: External ear normal. There is no impacted cerumen.      Left Ear: Tympanic membrane and external ear normal. There is no impacted cerumen.      Ears:      Comments: Scant effusion R TM  Eyes:      General:         Right eye: No discharge.         Left eye: No discharge.      Conjunctiva/sclera: Conjunctivae normal.   Neck:      Trachea: No tracheal deviation.   Cardiovascular:      Rate and Rhythm: Normal rate and regular rhythm.      Heart sounds: Normal heart sounds. No murmur heard.     No friction rub.   Pulmonary:      Effort: Pulmonary effort is normal. No respiratory distress.      Breath sounds: Normal breath sounds. No wheezing, rhonchi or rales.   Abdominal:      General: There is no distension.      Palpations: Abdomen is soft.      Tenderness: There is no abdominal tenderness. There is no guarding or rebound.   Musculoskeletal:         General: No deformity or signs of injury.      Cervical back: Neck supple.   Lymphadenopathy:      Cervical: Cervical adenopathy present.   Skin:     General: Skin is warm.      Coloration: Skin is not pale.      Findings: No rash.   Neurological:      General: No focal deficit present.      Mental Status: She is alert.      Motor: No abnormal muscle tone.      Gait: Gait normal.   Psychiatric:         Behavior: Behavior normal.         Thought Content: Thought content normal.         Judgment: Judgment normal.       Taylor Velazco DO"

## 2023-12-26 NOTE — ASSESSMENT & PLAN NOTE
Con't fiber supplement d/t inadequate dietary fiber intake, increase fluids and encouraged regular exercise, may take OTC stool softener daily for prevention, call with red flag GI symptoms OR if no better

## 2024-01-04 DIAGNOSIS — I10 ESSENTIAL HYPERTENSION: ICD-10-CM

## 2024-01-04 RX ORDER — LISINOPRIL 10 MG/1
10 TABLET ORAL DAILY
Qty: 30 TABLET | Refills: 5 | Status: SHIPPED | OUTPATIENT
Start: 2024-01-04

## 2024-01-15 ENCOUNTER — TELEPHONE (OUTPATIENT)
Dept: FAMILY MEDICINE CLINIC | Facility: HOSPITAL | Age: 54
End: 2024-01-15

## 2024-01-15 DIAGNOSIS — I10 ESSENTIAL HYPERTENSION: ICD-10-CM

## 2024-01-15 DIAGNOSIS — E78.00 PURE HYPERCHOLESTEROLEMIA: ICD-10-CM

## 2024-01-15 NOTE — TELEPHONE ENCOUNTER
Patient would like to get blood work done for upcoming appt on 1/29.  However, the paperwork says due 2/1.  Are you able to change this?

## 2024-01-15 NOTE — TELEPHONE ENCOUNTER
FYI, I reordered the labs for pt. She states she went to get them done this weekend and [facility] wouldn't do them because they have an expected date of 2/1/24

## 2024-01-22 NOTE — PROGRESS NOTES
Diagnoses and all orders for this visit:    BRBPR (bright red blood per rectum)  -     Ambulatory Referral to Colorectal Surgery       BRBPR (bright red blood per rectum)  Patient presents for evaluation.  Patient notes in December she had an episode of harder bowel movements.  She notes that intermittently she is prone to this and has been diagnosed with irritable bowel syndrome constipation.  With this she had a degree of rectal bleeding and discomfort.  This is since improved significantly following dietary changes to include higher fiber diet with supplements and stool softeners.  Examination today shows minimal internal hemorrhoidal disease.    Patient had a colonoscopy within the past 3 years.  This did not show any concerning pathology.  History and examination are consistent with outlet bleeding.  I believe this is best treated with bowel regimen to include high-fiber diet, hydration, stool softeners.  We discussed that stool softeners could be taken every day for as needed.  Mineral oil was offered as a alternative to pills.  Patient will incorporate these measures into her diet and will return on an as-needed basis.      JAY Sparks is referred by Dr. Beto Gregory for evaluation of bright red blood per rectum.     The patient notes episode of bright red rectal bleeding in the toilet bowl in December, and notes off and on episodes of bleeding upon wiping when straining.     Last colonoscopy performed on 7/16/2021 by Dr. Fatoumata Yu showed mild sigmoid diverticulosis  and internal hemorrhoids, with a 10 year recall.     Past Medical History:   Diagnosis Date    Anxiety     Anxiety disorder     Fibroid     Hirsutism     History of IBS     Irritable bowel syndrome 12/26/2023    Polycystic ovarian syndrome      Past Surgical History:   Procedure Laterality Date    EGD      MOUTH SURGERY      NO PAST SURGERIES         Current Outpatient Medications:     ALPRAZolam (XANAX) 0.5 mg tablet, Take 1  tablet (0.5 mg total) by mouth 3 (three) times a day as needed for anxiety, Disp: 30 tablet, Rfl: 1    b complex vitamins tablet, Take 1 tablet by mouth daily, Disp: , Rfl:     Calcium Carb-Cholecalciferol (CALCIUM + D3) 600-200 MG-UNIT TABS, Take by mouth, Disp: , Rfl:     calcium polycarbophil (FIBERCON) 625 mg tablet, Take 625 mg by mouth daily, Disp: , Rfl:     lisinopril (ZESTRIL) 10 mg tablet, TAKE ONE TABLET BY MOUTH EVERY DAY, Disp: 30 tablet, Rfl: 5    Multiple Vitamin (MULTI-VITAMIN DAILY) TABS, Take by mouth, Disp: , Rfl:   Allergies as of 01/26/2024 - Reviewed 01/26/2024   Allergen Reaction Noted    Amoxicillin  04/21/2012    Clarithromycin  04/21/2012    Doxycycline Other (See Comments) 12/04/2023    Influenza vaccines  09/25/2013    Naproxen  03/26/2014     Review of Systems   Gastrointestinal:  Positive for anal bleeding and constipation.   All other systems reviewed and are negative.    Vitals:    01/26/24 1501   BP: 126/76     Physical Exam  Constitutional:       Appearance: Normal appearance.   HENT:      Head: Normocephalic and atraumatic.   Eyes:      Extraocular Movements: Extraocular movements intact.      Pupils: Pupils are equal, round, and reactive to light.   Pulmonary:      Effort: Pulmonary effort is normal.   Musculoskeletal:         General: Normal range of motion.   Skin:     General: Skin is warm and dry.   Neurological:      General: No focal deficit present.      Mental Status: She is alert and oriented to person, place, and time.   Psychiatric:         Mood and Affect: Mood normal.         Behavior: Behavior normal.         Thought Content: Thought content normal.         Judgment: Judgment normal.     Lower Endoscopy    Date/Time: 1/26/2024 3:20 PM    Performed by: Beto Malik MD  Authorized by: Beto Malik MD    Verbal consent obtained?: Yes    Risks and benefits: Risks, benefits and alternatives were discussed    Consent given by:  Patient  Indications:  rectal bleeding    Patient sedated: No    Scope type:  Anoscope  External exam performed: Yes    Pilonidal sinus tract: No    Pilonidal cyst: No    Pilonidal tenderness: No    Perianal skin tags: No    Perirectal warts: No    Perianal maceration: No    Perianal induration: No    Perianal erythema: No    External hemorrhoids: No    Digital exam performed: Yes    Laxity of anal sphincter: No    Internal hemorrhoids: Yes    Prolapsed: No    Intraluminal mass: No    Inflammation: No    Anal fissures: No    Anal fistulae: No    Anal stricture: No    Abscess: No    Procedure termination:  Procedure complete  Patient tolerance:  Patient tolerated the procedure well with no immediate complications

## 2024-01-24 LAB
ALBUMIN SERPL-MCNC: 4.5 G/DL (ref 3.8–4.9)
ALBUMIN/GLOB SERPL: 1.9 {RATIO} (ref 1.2–2.2)
ALP SERPL-CCNC: 61 IU/L (ref 44–121)
ALT SERPL-CCNC: 23 IU/L (ref 0–32)
AST SERPL-CCNC: 16 IU/L (ref 0–40)
BILIRUB SERPL-MCNC: 0.3 MG/DL (ref 0–1.2)
BUN SERPL-MCNC: 12 MG/DL (ref 6–24)
BUN/CREAT SERPL: 17 (ref 9–23)
CALCIUM SERPL-MCNC: 9.5 MG/DL (ref 8.7–10.2)
CHLORIDE SERPL-SCNC: 102 MMOL/L (ref 96–106)
CHOLEST SERPL-MCNC: 191 MG/DL (ref 100–199)
CO2 SERPL-SCNC: 24 MMOL/L (ref 20–29)
CREAT SERPL-MCNC: 0.7 MG/DL (ref 0.57–1)
EGFR: 103 ML/MIN/1.73
ERYTHROCYTE [DISTWIDTH] IN BLOOD BY AUTOMATED COUNT: 11.9 % (ref 11.7–15.4)
EST. AVERAGE GLUCOSE BLD GHB EST-MCNC: 103 MG/DL
GLOBULIN SER-MCNC: 2.4 G/DL (ref 1.5–4.5)
GLUCOSE SERPL-MCNC: 90 MG/DL (ref 70–99)
HBA1C MFR BLD: 5.2 % (ref 4.8–5.6)
HCT VFR BLD AUTO: 43.2 % (ref 34–46.6)
HDLC SERPL-MCNC: 47 MG/DL
HGB BLD-MCNC: 14.6 G/DL (ref 11.1–15.9)
LDLC SERPL CALC-MCNC: 120 MG/DL (ref 0–99)
LDLC/HDLC SERPL: 2.6 RATIO (ref 0–3.2)
MCH RBC QN AUTO: 30.2 PG (ref 26.6–33)
MCHC RBC AUTO-ENTMCNC: 33.8 G/DL (ref 31.5–35.7)
MCV RBC AUTO: 89 FL (ref 79–97)
PLATELET # BLD AUTO: 401 X10E3/UL (ref 150–450)
POTASSIUM SERPL-SCNC: 4.2 MMOL/L (ref 3.5–5.2)
PROT SERPL-MCNC: 6.9 G/DL (ref 6–8.5)
RBC # BLD AUTO: 4.84 X10E6/UL (ref 3.77–5.28)
SL AMB VLDL CHOLESTEROL CALC: 24 MG/DL (ref 5–40)
SODIUM SERPL-SCNC: 139 MMOL/L (ref 134–144)
TRIGL SERPL-MCNC: 133 MG/DL (ref 0–149)
TSH SERPL DL<=0.005 MIU/L-ACNC: 3.39 UIU/ML (ref 0.45–4.5)
WBC # BLD AUTO: 5.2 X10E3/UL (ref 3.4–10.8)

## 2024-01-26 ENCOUNTER — OFFICE VISIT (OUTPATIENT)
Age: 54
End: 2024-01-26
Payer: COMMERCIAL

## 2024-01-26 VITALS
SYSTOLIC BLOOD PRESSURE: 126 MMHG | WEIGHT: 175 LBS | HEIGHT: 65 IN | DIASTOLIC BLOOD PRESSURE: 76 MMHG | BODY MASS INDEX: 29.16 KG/M2

## 2024-01-26 DIAGNOSIS — K62.5 BRBPR (BRIGHT RED BLOOD PER RECTUM): ICD-10-CM

## 2024-01-26 PROCEDURE — 99203 OFFICE O/P NEW LOW 30 MIN: CPT | Performed by: COLON & RECTAL SURGERY

## 2024-01-26 PROCEDURE — 46600 DIAGNOSTIC ANOSCOPY SPX: CPT | Performed by: COLON & RECTAL SURGERY

## 2024-01-26 NOTE — ASSESSMENT & PLAN NOTE
Patient presents for evaluation.  Patient notes in December she had an episode of harder bowel movements.  She notes that intermittently she is prone to this and has been diagnosed with irritable bowel syndrome constipation.  With this she had a degree of rectal bleeding and discomfort.  This is since improved significantly following dietary changes to include higher fiber diet with supplements and stool softeners.  Examination today shows minimal internal hemorrhoidal disease.    Patient had a colonoscopy within the past 3 years.  This did not show any concerning pathology.  History and examination are consistent with outlet bleeding.  I believe this is best treated with bowel regimen to include high-fiber diet, hydration, stool softeners.  We discussed that stool softeners could be taken every day for as needed.  Mineral oil was offered as a alternative to pills.  Patient will incorporate these measures into her diet and will return on an as-needed basis.

## 2024-01-29 ENCOUNTER — OFFICE VISIT (OUTPATIENT)
Dept: FAMILY MEDICINE CLINIC | Facility: HOSPITAL | Age: 54
End: 2024-01-29
Payer: COMMERCIAL

## 2024-01-29 VITALS
TEMPERATURE: 98.4 F | BODY MASS INDEX: 29.32 KG/M2 | HEIGHT: 65 IN | HEART RATE: 70 BPM | SYSTOLIC BLOOD PRESSURE: 132 MMHG | DIASTOLIC BLOOD PRESSURE: 88 MMHG | WEIGHT: 176 LBS

## 2024-01-29 DIAGNOSIS — L60.0 INGROWN LEFT GREATER TOENAIL: ICD-10-CM

## 2024-01-29 DIAGNOSIS — K62.5 BRBPR (BRIGHT RED BLOOD PER RECTUM): ICD-10-CM

## 2024-01-29 DIAGNOSIS — E04.1 NONTOXIC SINGLE THYROID NODULE: ICD-10-CM

## 2024-01-29 DIAGNOSIS — E78.00 PURE HYPERCHOLESTEROLEMIA: ICD-10-CM

## 2024-01-29 DIAGNOSIS — I10 ESSENTIAL HYPERTENSION: Primary | ICD-10-CM

## 2024-01-29 PROCEDURE — 99214 OFFICE O/P EST MOD 30 MIN: CPT | Performed by: FAMILY MEDICINE

## 2024-01-30 NOTE — PROGRESS NOTES
Name: Sophia Sparks      : 1970      MRN: [id number]  Encounter Provider: Beto Gregory MD  Encounter Date: 2024   Encounter department: [facility] CARE [address]     Assessment & Plan     1. Essential hypertension  Assessment & Plan:  Adequate BP control      2. BRBPR (bright red blood per rectum)  Assessment & Plan:  Benign evaluation per Dr Malik      3. BMI 29.0-29.9,adult    4. Nontoxic single thyroid nodule    5. Pure hypercholesterolemia    6. Ingrown left greater toenail  Assessment & Plan:  Refer back to Dr Grace          Depression Screening and Follow-up Plan: Patient was screened for depression during today's encounter. They screened negative with a PHQ-9 score of 3.        Subjective     6 month follow up.    No recent illness or injury    Seen for blood per rectum by Dr Malik  Trying to put more fiber in her diet and advised her to take mineral oil    Following own BP's    Still some problem with both big toes        Review of Systems   HENT: Negative.     Respiratory: Negative.     Cardiovascular: Negative.    Gastrointestinal:  Positive for blood in stool.   Genitourinary: Negative.    Musculoskeletal:  Positive for arthralgias.   Neurological: Negative.    Hematological: Negative.    Psychiatric/Behavioral: Negative.     All other systems reviewed and are negative.      Past Medical History:   Diagnosis Date    Anxiety     Anxiety disorder     Fibroid     Hirsutism     History of IBS     Irritable bowel syndrome 2023    Polycystic ovarian syndrome      Past Surgical History:   Procedure Laterality Date    EGD      MOUTH SURGERY      NO PAST SURGERIES       Family History   Problem Relation Age of Onset    Arthritis Mother     Depression Mother     Hypertension Mother     Thyroid disease Mother     Crohn's disease Father     Diabetes Father     Heart disease Father     Hypertension Father     No Known Problems Maternal Grandmother     No Known Problems  Maternal Grandfather     No Known Problems Paternal Grandmother     No Known Problems Paternal Grandfather     Uterine cancer Maternal Aunt         70's    No Known Problems Maternal Aunt     No Known Problems Maternal Aunt     No Known Problems Maternal Aunt     No Known Problems Maternal Aunt     Lung cancer Maternal Uncle         Unknown age    Breast cancer Cousin         Unknown age    Breast cancer Cousin         Unknown age    Breast cancer Cousin         30's    Colon cancer Neg Hx      Social History     Socioeconomic History    Marital status: Single     Spouse name: None    Number of children: None    Years of education: None    Highest education level: None   Occupational History    None   Tobacco Use    Smoking status: Never    Smokeless tobacco: Never   Vaping Use    Vaping status: Never Used   Substance and Sexual Activity    Alcohol use: Yes     Comment: rarely    Drug use: No    Sexual activity: Not Currently     Partners: Male     Birth control/protection: Pill   Other Topics Concern    None   Social History Narrative    Caffeine use     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    Uses safety equipment - seat belts     Social Determinants of Health     Financial Resource Strain: Not on file   Food Insecurity: Not on file   Transportation Needs: Not on file   Physical Activity: Not on file   Stress: Not on file   Social Connections: Not on file   Intimate Partner Violence: Not on file   Housing Stability: Not on file     Current Outpatient Medications on File Prior to Visit   Medication Sig    ALPRAZolam (XANAX) 0.5 mg tablet Take 1 tablet (0.5 mg total) by mouth 3 (three) times a day as needed for anxiety    b complex vitamins tablet Take 1 tablet by mouth daily    Calcium Carb-Cholecalciferol (CALCIUM + D3) 600-200 MG-UNIT TABS Take by mouth    calcium polycarbophil (FIBERCON) 625 mg tablet Take 625 mg by mouth daily    lisinopril (ZESTRIL) 10 mg tablet TAKE ONE TABLET BY MOUTH EVERY DAY    Multiple Vitamin  "(MULTI-VITAMIN DAILY) TABS Take by mouth     Allergies   Allergen Reactions    Amoxicillin      Other reaction(s): GI Upset  Reaction Date: 12Sep2011;     Clarithromycin      Reaction Date: 12Sep2011;     Doxycycline Other (See Comments)    Influenza Vaccines     Naproxen      Other reaction(s): GI Upset     Immunization History   Administered Date(s) Administered    COVID-19 PFIZER VACCINE 0.3 ML IM 04/08/2021, 05/01/2021, 11/26/2021, 04/29/2022    COVID-19 Pfizer Vac BIVALENT Juanpablo-sucrose 12 Yr+ IM 01/07/2023    INFLUENZA 11/13/2021    Influenza, injectable, quadrivalent, preservative free 0.5 mL 11/21/2020    Tdap 04/28/2014       Objective     /88   Pulse 70   Temp 98.4 °F (36.9 °C)   Ht 5' 5\" (1.651 m)   Wt 79.8 kg (176 lb)   BMI 29.29 kg/m²     Physical Exam  Vitals and nursing note reviewed.   Constitutional:       Appearance: Normal appearance.   Neck:      Vascular: No carotid bruit.   Cardiovascular:      Rate and Rhythm: Normal rate and regular rhythm.      Pulses: Normal pulses.      Heart sounds: Normal heart sounds.   Pulmonary:      Breath sounds: Normal breath sounds.   Musculoskeletal:      Right lower leg: No edema.      Left lower leg: No edema.   Lymphadenopathy:      Cervical: No cervical adenopathy.   Neurological:      General: No focal deficit present.      Mental Status: She is alert and oriented to person, place, and time.       Beto Gregory MD    "

## 2024-02-19 ENCOUNTER — OFFICE VISIT (OUTPATIENT)
Dept: FAMILY MEDICINE CLINIC | Facility: HOSPITAL | Age: 54
End: 2024-02-19
Payer: COMMERCIAL

## 2024-02-19 VITALS
TEMPERATURE: 98.2 F | WEIGHT: 177 LBS | HEART RATE: 86 BPM | BODY MASS INDEX: 29.49 KG/M2 | HEIGHT: 65 IN | SYSTOLIC BLOOD PRESSURE: 124 MMHG | OXYGEN SATURATION: 99 % | DIASTOLIC BLOOD PRESSURE: 78 MMHG

## 2024-02-19 DIAGNOSIS — M77.8 RIGHT ELBOW TENDINITIS: Primary | ICD-10-CM

## 2024-02-19 PROCEDURE — 99213 OFFICE O/P EST LOW 20 MIN: CPT | Performed by: FAMILY MEDICINE

## 2024-02-20 NOTE — PROGRESS NOTES
"Name: Sophia Sparks      : 1970      MRN: [id number]  Encounter Provider: Beto Gregory MD  Encounter Date: 2024   Encounter department: [facility] PRIMARY CARE [address]     Assessment & Plan     1. Right elbow tendinitis  Assessment & Plan:  Use R elbow epicondylitis splint          Depression Screening and Follow-up Plan: Clincally patient does not have depression. No treatment is required.         Subjective      Acute visit for pain RUE she believes started when shoveling snow  Also probably from holding phone    Used carpal tunnel splint with benefit.    Tenderness of medial epicondylitis      Review of Systems   Musculoskeletal:  Positive for arthralgias.   All other systems reviewed and are negative.      Current Outpatient Medications on File Prior to Visit   Medication Sig    ALPRAZolam (XANAX) 0.5 mg tablet Take 1 tablet (0.5 mg total) by mouth 3 (three) times a day as needed for anxiety    b complex vitamins tablet Take 1 tablet by mouth daily    Calcium Carb-Cholecalciferol (CALCIUM + D3) 600-200 MG-UNIT TABS Take by mouth    calcium polycarbophil (FIBERCON) 625 mg tablet Take 625 mg by mouth daily    lisinopril (ZESTRIL) 10 mg tablet TAKE ONE TABLET BY MOUTH EVERY DAY    Multiple Vitamin (MULTI-VITAMIN DAILY) TABS Take by mouth       Objective     /78 (BP Location: Left arm, Patient Position: Sitting, Cuff Size: Large)   Pulse 86   Temp 98.2 °F (36.8 °C) (Tympanic)   Ht 5' 5\" (1.651 m)   Wt 80.3 kg (177 lb)   SpO2 99%   BMI 29.45 kg/m²     Physical Exam  Musculoskeletal:         General: Tenderness present. No deformity.      Right elbow: No deformity or effusion. Tenderness present.      Left elbow: Normal.       Beto Gregory MD    "

## 2024-03-25 ENCOUNTER — TELEPHONE (OUTPATIENT)
Dept: FAMILY MEDICINE CLINIC | Facility: HOSPITAL | Age: 54
End: 2024-03-25

## 2024-03-25 NOTE — TELEPHONE ENCOUNTER
Patient hit top of her head on a shelf yesterday.  Very tender in the area.  No blood, dizziness, change in vision.  Patient taking tylenol for the pain with not much relief.  She read someone that ibuprofen should not be taken immediately after a head bump.  She did apply ice yesterday.    Is it ok to take ibuprofen?    pcb

## 2024-03-26 ENCOUNTER — OFFICE VISIT (OUTPATIENT)
Dept: FAMILY MEDICINE CLINIC | Facility: HOSPITAL | Age: 54
End: 2024-03-26
Payer: COMMERCIAL

## 2024-03-26 VITALS
BODY MASS INDEX: 29.56 KG/M2 | DIASTOLIC BLOOD PRESSURE: 88 MMHG | WEIGHT: 177.4 LBS | SYSTOLIC BLOOD PRESSURE: 122 MMHG | OXYGEN SATURATION: 98 % | TEMPERATURE: 97.3 F | HEIGHT: 65 IN | HEART RATE: 81 BPM

## 2024-03-26 DIAGNOSIS — I10 ESSENTIAL HYPERTENSION: ICD-10-CM

## 2024-03-26 DIAGNOSIS — S00.03XA CONTUSION OF SCALP, INITIAL ENCOUNTER: Primary | ICD-10-CM

## 2024-03-26 PROCEDURE — 99213 OFFICE O/P EST LOW 20 MIN: CPT | Performed by: FAMILY MEDICINE

## 2024-03-26 NOTE — PROGRESS NOTES
"Name: Sophia Sparks      : 1970      MRN: [id number]  Encounter Provider: Beto Gregory MD  Encounter Date: 3/26/2024   Encounter department: [facility] PRIMARY CARE [address]     Assessment & Plan     1. Contusion of scalp, initial encounter    2. BMI 29.0-29.9,adult    3. Essential hypertension        Depression Screening and Follow-up Plan: Patient assessed for underlying major depression. Brief counseling provided and recommend additional follow-up/re-evaluation next office visit.         Subjective      Bumped her head on shelf when bending over 3/24/24  Had concerns and questions about what is OK to take in this situation  No LOC  No break in the skin  Has persistent soreness/sensitivity of scalp      Review of Systems   Neurological:  Negative for dizziness, syncope, numbness and headaches.   Psychiatric/Behavioral:  The patient is nervous/anxious.    All other systems reviewed and are negative.      Current Outpatient Medications on File Prior to Visit   Medication Sig    ALPRAZolam (XANAX) 0.5 mg tablet Take 1 tablet (0.5 mg total) by mouth 3 (three) times a day as needed for anxiety    b complex vitamins tablet Take 1 tablet by mouth daily    Calcium Carb-Cholecalciferol (CALCIUM + D3) 600-200 MG-UNIT TABS Take by mouth    calcium polycarbophil (FIBERCON) 625 mg tablet Take 625 mg by mouth daily    lisinopril (ZESTRIL) 10 mg tablet TAKE ONE TABLET BY MOUTH EVERY DAY    Multiple Vitamin (MULTI-VITAMIN DAILY) TABS Take by mouth       Objective     /88   Pulse 81   Temp (!) 97.3 °F (36.3 °C) (Tympanic)   Ht 5' 5\" (1.651 m)   Wt 80.5 kg (177 lb 6.4 oz)   SpO2 98%   BMI 29.52 kg/m²     Physical Exam  Vitals reviewed.   Constitutional:       Appearance: Normal appearance.   Skin:     Findings: No bruising or rash.   Neurological:      Mental Status: She is alert.   Psychiatric:         Mood and Affect: Mood normal.       Beto Gregory MD    "

## 2024-04-16 ENCOUNTER — OFFICE VISIT (OUTPATIENT)
Dept: FAMILY MEDICINE CLINIC | Facility: HOSPITAL | Age: 54
End: 2024-04-16
Payer: COMMERCIAL

## 2024-04-16 VITALS
HEART RATE: 80 BPM | SYSTOLIC BLOOD PRESSURE: 142 MMHG | WEIGHT: 174.6 LBS | BODY MASS INDEX: 29.09 KG/M2 | DIASTOLIC BLOOD PRESSURE: 84 MMHG | TEMPERATURE: 98.4 F | HEIGHT: 65 IN

## 2024-04-16 DIAGNOSIS — J02.9 PHARYNGITIS, UNSPECIFIED ETIOLOGY: Primary | ICD-10-CM

## 2024-04-16 LAB — S PYO DNA THROAT QL NAA+PROBE: NOT DETECTED

## 2024-04-16 PROCEDURE — 99213 OFFICE O/P EST LOW 20 MIN: CPT | Performed by: NURSE PRACTITIONER

## 2024-04-16 PROCEDURE — 87651 STREP A DNA AMP PROBE: CPT | Performed by: NURSE PRACTITIONER

## 2024-04-16 NOTE — PROGRESS NOTES
"Name: Sophia Sparks      : 1970      MRN: [id number]  Encounter Provider: TANVI Gloria  Encounter Date: 2024   Encounter department: [facility] CARE [address]     Assessment & Plan     1. Pharyngitis, unspecified etiology  -     POCT rapid PCR strepA  -     Throat culture; Future  -     Throat culture    Doubt strep given normal exam and negative rapid strep. Will send cx to confirm & rx antibiotic if needed pending result  Reassured pt, white area is likely a tonsil stone - advise salt water gargles  Continue OTC meds prn, get adequate rest & fluids       Subjective      Has had a sore throat since yesterday and was feeling off. Noted \"a little white thing\" in throat. States this is how her sinus infections start but not congested right now. Some rhinorrhea this morning. Took advil cold/sinus this morning. No fever/chills.         Review of Systems   Constitutional:  Negative for chills and fever.   HENT:  Positive for ear pain (right last night) and sore throat. Negative for congestion.    Respiratory: Negative.     Allergic/Immunologic: Positive for environmental allergies (skipped a couple days of allergy med past few days, did take today).       Current Outpatient Medications on File Prior to Visit   Medication Sig    ALPRAZolam (XANAX) 0.5 mg tablet Take 1 tablet (0.5 mg total) by mouth 3 (three) times a day as needed for anxiety    b complex vitamins tablet Take 1 tablet by mouth daily    Calcium Carb-Cholecalciferol (CALCIUM + D3) 600-200 MG-UNIT TABS Take by mouth    calcium polycarbophil (FIBERCON) 625 mg tablet Take 625 mg by mouth daily    lisinopril (ZESTRIL) 10 mg tablet TAKE ONE TABLET BY MOUTH EVERY DAY    Multiple Vitamin (MULTI-VITAMIN DAILY) TABS Take by mouth       Objective     /84   Pulse 80   Temp 98.4 °F (36.9 °C)   Ht 5' 5\" (1.651 m)   Wt 79.2 kg (174 lb 9.6 oz)   BMI 29.05 kg/m²       Physical Exam  Vitals reviewed.   Constitutional:       " General: She is not in acute distress.     Appearance: She is well-developed.   HENT:      Head: Normocephalic.      Right Ear: A middle ear effusion is present.      Left Ear: Tympanic membrane normal.      Nose: No congestion or rhinorrhea.      Mouth/Throat:      Mouth: Mucous membranes are moist.      Pharynx: Posterior oropharyngeal erythema present. No oropharyngeal exudate.      Tonsils: 1+ on the right. 1+ on the left.      Comments: Single tonsil stone right tonsil  Eyes:      Conjunctiva/sclera: Conjunctivae normal.   Cardiovascular:      Rate and Rhythm: Normal rate and regular rhythm.      Heart sounds: No murmur heard.  Pulmonary:      Effort: Pulmonary effort is normal. No respiratory distress.      Breath sounds: Normal breath sounds.   Skin:     General: Skin is warm and dry.   Neurological:      General: No focal deficit present.      Mental Status: She is alert and oriented to person, place, and time.   Psychiatric:         Mood and Affect: Mood normal.         Behavior: Behavior normal.         TANVI Gloria

## 2024-04-18 ENCOUNTER — TELEPHONE (OUTPATIENT)
Dept: FAMILY MEDICINE CLINIC | Facility: HOSPITAL | Age: 54
End: 2024-04-18

## 2024-04-18 NOTE — TELEPHONE ENCOUNTER
--I had seen Saul on Tuesday for a what she diagnosed with a tonsil stone. I'm just trying to find out how long that lasts or if it comes with other symptoms. I've had a cough which could be just because they're irritated but I've been blowing my nose and then I know my right ear seems to be kind of full. So I did put a message into my chart this morning but I haven't heard anything back and I was just also checking to see if there was any results back on the other two strep that she ran.  I'm taking cold and sinus which seems to be doing OK and cough medicine and allergy pills but I'm still struggling a little here. My phone number is [phone]. Thanks

## 2024-04-19 DIAGNOSIS — J06.9 UPPER RESPIRATORY TRACT INFECTION, UNSPECIFIED TYPE: Primary | ICD-10-CM

## 2024-04-19 LAB — B-HEM STREP SPEC QL CULT: NEGATIVE

## 2024-04-19 RX ORDER — AZITHROMYCIN 250 MG/1
TABLET, FILM COATED ORAL DAILY
Qty: 6 TABLET | Refills: 0 | Status: SHIPPED | OUTPATIENT
Start: 2024-04-19 | End: 2024-04-24

## 2024-06-05 ENCOUNTER — TELEPHONE (OUTPATIENT)
Age: 54
End: 2024-06-05

## 2024-06-05 ENCOUNTER — PATIENT MESSAGE (OUTPATIENT)
Dept: FAMILY MEDICINE CLINIC | Facility: HOSPITAL | Age: 54
End: 2024-06-05

## 2024-06-05 DIAGNOSIS — L03.316 CELLULITIS OF UMBILICUS: Primary | ICD-10-CM

## 2024-06-05 RX ORDER — CEPHALEXIN 500 MG/1
500 CAPSULE ORAL 3 TIMES DAILY
Qty: 21 CAPSULE | Refills: 0 | Status: SHIPPED | OUTPATIENT
Start: 2024-06-05 | End: 2024-06-12

## 2024-06-05 NOTE — TELEPHONE ENCOUNTER
Patient wanted the doctor to know that she was successfully able to upload the pictures of her belly button onto her MyChart.

## 2024-06-06 ENCOUNTER — TELEPHONE (OUTPATIENT)
Dept: FAMILY MEDICINE CLINIC | Facility: HOSPITAL | Age: 54
End: 2024-06-06

## 2024-06-10 ENCOUNTER — OFFICE VISIT (OUTPATIENT)
Dept: FAMILY MEDICINE CLINIC | Facility: HOSPITAL | Age: 54
End: 2024-06-10
Payer: COMMERCIAL

## 2024-06-10 VITALS
SYSTOLIC BLOOD PRESSURE: 148 MMHG | DIASTOLIC BLOOD PRESSURE: 86 MMHG | WEIGHT: 176.4 LBS | TEMPERATURE: 98.3 F | BODY MASS INDEX: 29.39 KG/M2 | HEIGHT: 65 IN | HEART RATE: 83 BPM

## 2024-06-10 DIAGNOSIS — L03.316 CELLULITIS, UMBILICAL: Primary | ICD-10-CM

## 2024-06-10 PROCEDURE — 99213 OFFICE O/P EST LOW 20 MIN: CPT | Performed by: NURSE PRACTITIONER

## 2024-06-10 NOTE — PROGRESS NOTES
Ambulatory Visit  Name: Sophia Sparks      : 1970      MRN: [id number]  Encounter Provider: TANVI Gloria  Encounter Date: 6/10/2024   Encounter department: [facility] PRIMARY CARE [address]     Assessment & Plan   1. Cellulitis, umbilical  Comments:  improving on cephalexin as rx'd, advise she continue BID dosing to complete 1 week course, recommend warm compresses prn discomfort         History of Present Illness       Had a red belly button last week so Dr Gregory prescribed cephalexin. She took 1 dose the past 2 days and states she is tolerating it ok. She took 2 doses today with food. Med makes her gassy. Started with discomfort in area of belly button today and unsure if redness is better having a hard time seeing the area. Moved bowels normally this afternoon.         Review of Systems   Constitutional:  Negative for chills and fever.   Skin:  Positive for rash (red belly button).   Psychiatric/Behavioral:  The patient is nervous/anxious.        Past Medical History:   Diagnosis Date    Anxiety     Anxiety disorder     Fibroid     Hirsutism     History of IBS     Irritable bowel syndrome 2023    Polycystic ovarian syndrome      Past Surgical History:   Procedure Laterality Date    EGD      MOUTH SURGERY      NO PAST SURGERIES       Family History   Problem Relation Age of Onset    Arthritis Mother     Depression Mother     Hypertension Mother     Thyroid disease Mother     Crohn's disease Father     Diabetes Father     Heart disease Father     Hypertension Father     No Known Problems Maternal Grandmother     No Known Problems Maternal Grandfather     No Known Problems Paternal Grandmother     No Known Problems Paternal Grandfather     Uterine cancer Maternal Aunt         70's    No Known Problems Maternal Aunt     No Known Problems Maternal Aunt     No Known Problems Maternal Aunt     No Known Problems Maternal Aunt     Lung cancer Maternal Uncle         Unknown age    Breast  "cancer Cousin         Unknown age    Breast cancer Cousin         Unknown age    Breast cancer Cousin         30's    Colon cancer Neg Hx      Social History     Tobacco Use    Smoking status: Never    Smokeless tobacco: Never   Vaping Use    Vaping status: Never Used   Substance and Sexual Activity    Alcohol use: Yes     Comment: rarely    Drug use: No    Sexual activity: Not Currently     Partners: Male     Birth control/protection: Pill     Current Outpatient Medications on File Prior to Visit   Medication Sig    ALPRAZolam (XANAX) 0.5 mg tablet Take 1 tablet (0.5 mg total) by mouth 3 (three) times a day as needed for anxiety    b complex vitamins tablet Take 1 tablet by mouth daily    Calcium Carb-Cholecalciferol (CALCIUM + D3) 600-200 MG-UNIT TABS Take by mouth    calcium polycarbophil (FIBERCON) 625 mg tablet Take 625 mg by mouth daily    cephalexin (KEFLEX) 500 mg capsule Take 1 capsule (500 mg total) by mouth 3 (three) times a day for 7 days    lisinopril (ZESTRIL) 10 mg tablet TAKE ONE TABLET BY MOUTH EVERY DAY    Multiple Vitamin (MULTI-VITAMIN DAILY) TABS Take by mouth     Allergies   Allergen Reactions    Amoxicillin      Other reaction(s): GI Upset  Reaction Date: 12Sep2011;     Clarithromycin      Reaction Date: 12Sep2011;     Doxycycline Other (See Comments)    Influenza Vaccines     Naproxen      Other reaction(s): GI Upset     Immunization History   Administered Date(s) Administered    COVID-19 PFIZER VACCINE 0.3 ML IM 04/08/2021, 05/01/2021, 11/26/2021, 04/29/2022    COVID-19 Pfizer Vac BIVALENT Juanpablo-sucrose 12 Yr+ IM 01/07/2023    COVID-19 Pfizer vac (Juanpablo-sucrose, gray cap) 12 yr+ IM 04/29/2022    INFLUENZA 11/21/2020, 11/13/2021    Influenza, injectable, quadrivalent, preservative free 0.5 mL 11/21/2020    Tdap 04/28/2014     Objective     /86   Pulse 83   Temp 98.3 °F (36.8 °C)   Ht 5' 5\" (1.651 m)   Wt 80 kg (176 lb 6.4 oz)   BMI 29.35 kg/m²       Physical Exam  Vitals reviewed. "   Constitutional:       General: She is not in acute distress.     Appearance: Normal appearance.   HENT:      Head: Normocephalic.   Pulmonary:      Effort: Pulmonary effort is normal. No respiratory distress.   Abdominal:      General: Bowel sounds are normal.      Palpations: Abdomen is soft.      Tenderness: There is no abdominal tenderness. There is no guarding or rebound.       Skin:     General: Skin is warm and dry.   Neurological:      General: No focal deficit present.      Mental Status: She is alert and oriented to person, place, and time.   Psychiatric:         Mood and Affect: Mood is anxious.         Behavior: Behavior normal.         Thought Content: Thought content normal.         Judgment: Judgment normal.         Administrative Statements   I have spent a total time of 15 minutes on 06/10/24 In caring for this patient including Prognosis, Instructions for management, Patient and family education, Importance of tx compliance, Risk factor reductions, Impressions, Counseling / Coordination of care, Documenting in the medical record, Reviewing / ordering tests, medicine, procedures  , and Obtaining or reviewing history  .

## 2024-06-30 DIAGNOSIS — I10 ESSENTIAL HYPERTENSION: ICD-10-CM

## 2024-06-30 RX ORDER — LISINOPRIL 10 MG/1
10 TABLET ORAL DAILY
Qty: 30 TABLET | Refills: 5 | Status: SHIPPED | OUTPATIENT
Start: 2024-06-30

## 2024-08-19 ENCOUNTER — OFFICE VISIT (OUTPATIENT)
Dept: FAMILY MEDICINE CLINIC | Facility: HOSPITAL | Age: 54
End: 2024-08-19
Payer: COMMERCIAL

## 2024-08-19 VITALS
TEMPERATURE: 97.8 F | HEIGHT: 65 IN | WEIGHT: 182 LBS | HEART RATE: 76 BPM | OXYGEN SATURATION: 99 % | SYSTOLIC BLOOD PRESSURE: 142 MMHG | DIASTOLIC BLOOD PRESSURE: 82 MMHG | BODY MASS INDEX: 30.32 KG/M2

## 2024-08-19 DIAGNOSIS — Z00.00 WELL ADULT EXAM: Primary | ICD-10-CM

## 2024-08-19 DIAGNOSIS — E55.9 VITAMIN D DEFICIENCY: ICD-10-CM

## 2024-08-19 DIAGNOSIS — F41.9 ANXIETY: ICD-10-CM

## 2024-08-19 DIAGNOSIS — I10 ESSENTIAL HYPERTENSION: ICD-10-CM

## 2024-08-19 PROBLEM — F43.21 GRIEF REACTION: Status: RESOLVED | Noted: 2023-03-16 | Resolved: 2024-08-19

## 2024-08-19 PROBLEM — F43.20 GRIEF REACTION: Status: RESOLVED | Noted: 2023-03-16 | Resolved: 2024-08-19

## 2024-08-19 PROCEDURE — 99396 PREV VISIT EST AGE 40-64: CPT | Performed by: FAMILY MEDICINE

## 2024-08-19 NOTE — PROGRESS NOTES
Adult Annual Physical  Name: Sophia Sparks      : 1970      MRN: [id number]  Encounter Provider: Beto Gregory MD  Encounter Date: 2024   Encounter department: [facility] PRIMARY CARE [address]     Assessment & Plan   1. Well adult exam  -     CBC and differential; Future  -     Comprehensive metabolic panel; Future  -     Hemoglobin A1C; Future  -     Lipid Panel with Direct LDL reflex; Future  -     TSH, 3rd generation with Free T4 reflex; Future  -     CBC and differential  -     Comprehensive metabolic panel  -     Hemoglobin A1C  -     Lipid Panel with Direct LDL reflex  -     TSH, 3rd generation with Free T4 reflex  2. Vitamin D deficiency  -     Vitamin D 25 hydroxy; Future  -     Vitamin D 25 hydroxy  3. Essential hypertension  Assessment & Plan:  Adequate control  4. Anxiety  Assessment & Plan:  Stable limited use of Alprazolam    Immunizations and preventive care screenings were discussed with patient today. Appropriate education was printed on patient's after visit summary.    Counseling:  Dental Health: discussed importance of regular tooth brushing, flossing, and dental visits.  Injury prevention: discussed safety/seat belts, safety helmets, smoke detectors, carbon dioxide detectors, and smoking near bedding or upholstery.  Exercise: the importance of regular exercise/physical activity was discussed. Recommend exercise 3-5 times per week for at least 30 minutes.          History of Present Illness     Adult Annual Physical:  Patient presents for annual physical. Annual exam    Some LBP from sleeping on couch   Uses Salonpas cream    Checking own BP's on Lisinopril 10mg 1/2 QOD.     Diet and Physical Activity:  - Diet/Nutrition: well balanced diet.  - Exercise: walking.    General Health:  - Sleep: sleeps well.  - Hearing: normal hearing bilateral ears.  - Vision: vision problems and wears glasses.  - Dental: regular dental visits and brushes teeth twice daily.    /GYN  "Health:  - Follows with GYN: yes.   - Menopause: perimenopausal.   - History of STDs: no    Review of Systems   Constitutional: Negative.    HENT: Negative.     Respiratory: Negative.     Cardiovascular: Negative.    Gastrointestinal: Negative.    Genitourinary: Negative.    Musculoskeletal: Negative.    Neurological: Negative.    Hematological: Negative.    Psychiatric/Behavioral: Negative.     All other systems reviewed and are negative.    Pertinent Medical History       Objective     /82 (BP Location: Left arm, Patient Position: Sitting, Cuff Size: Standard)   Pulse 76   Temp 97.8 °F (36.6 °C) (Tympanic)   Ht 5' 5\" (1.651 m)   Wt 82.6 kg (182 lb)   SpO2 99%   BMI 30.29 kg/m²     Physical Exam  Vitals and nursing note reviewed.   Constitutional:       Appearance: Normal appearance.   HENT:      Right Ear: Tympanic membrane and ear canal normal.      Left Ear: Tympanic membrane and ear canal normal.      Nose: Nose normal.      Mouth/Throat:      Mouth: Mucous membranes are moist.   Neck:      Vascular: No carotid bruit.   Cardiovascular:      Rate and Rhythm: Normal rate and regular rhythm.      Pulses: Normal pulses.      Heart sounds: Normal heart sounds.   Pulmonary:      Effort: Pulmonary effort is normal.      Breath sounds: Normal breath sounds.   Abdominal:      Palpations: Abdomen is soft.   Musculoskeletal:      Right lower leg: No edema.      Left lower leg: No edema.   Skin:     Findings: No rash.   Neurological:      Mental Status: She is alert and oriented to person, place, and time.   Psychiatric:         Mood and Affect: Mood normal.         "

## 2024-08-21 DIAGNOSIS — Z12.83 SCREENING FOR MALIGNANT NEOPLASM OF SKIN: Primary | ICD-10-CM

## 2024-09-05 ENCOUNTER — NURSE TRIAGE (OUTPATIENT)
Age: 54
End: 2024-09-05

## 2024-09-05 NOTE — TELEPHONE ENCOUNTER
"Patient called in to report she had cramping in right calf last night which woke her up. She noticed a bruise there. She denies it is red, warm or hard. It does not hurt when flexing her foot back. She denies SOB, chest pain, rapid heart beat.   She was on a bus trip last week and sitting for extended period of time. OV tomorrow 8:20 am. Patient will report to ED with any SOB, chest pain, dizziness, rapid pulse.    Reason for Disposition   Localized pain, redness or hard lump along vein    Answer Assessment - Initial Assessment Questions  1. ONSET: \"When did the pain start?\"       Last night  2. LOCATION: \"Where is the pain located?\"       Right calf  3. PAIN: \"How bad is the pain?\"    (Scale 1-10; or mild, moderate, severe)    -  MILD (1-3): doesn't interfere with normal activities     -  MODERATE (4-7): interferes with normal activities (e.g., work or school) or awakens from sleep, limping     -  SEVERE (8-10): excruciating pain, unable to do any normal activities, unable to walk      Tightness   4. WORK OR EXERCISE: \"Has there been any recent work or exercise that involved this part of the body?\"       no  5. CAUSE: \"What do you think is causing the leg pain?\"      Unknown   6. OTHER SYMPTOMS: \"Do you have any other symptoms?\" (e.g., chest pain, back pain, breathing difficulty, swelling, rash, fever, numbness, weakness)      no  7. PREGNANCY: \"Is there any chance you are pregnant?\" \"When was your last menstrual period?\"      Paulette menopausal, denies pregnant    Protocols used: Leg Pain-ADULT-[place]    "

## 2024-09-06 ENCOUNTER — OFFICE VISIT (OUTPATIENT)
Dept: FAMILY MEDICINE CLINIC | Facility: HOSPITAL | Age: 54
End: 2024-09-06
Payer: COMMERCIAL

## 2024-09-06 ENCOUNTER — TELEPHONE (OUTPATIENT)
Age: 54
End: 2024-09-06

## 2024-09-06 ENCOUNTER — HOSPITAL ENCOUNTER (OUTPATIENT)
Dept: NON INVASIVE DIAGNOSTICS | Age: 54
Discharge: HOME | End: 2024-09-06
Payer: COMMERCIAL

## 2024-09-06 VITALS
TEMPERATURE: 97.8 F | HEART RATE: 82 BPM | BODY MASS INDEX: 30.12 KG/M2 | HEIGHT: 65 IN | SYSTOLIC BLOOD PRESSURE: 138 MMHG | WEIGHT: 180.8 LBS | DIASTOLIC BLOOD PRESSURE: 84 MMHG

## 2024-09-06 DIAGNOSIS — M79.661 RIGHT CALF PAIN: ICD-10-CM

## 2024-09-06 DIAGNOSIS — M79.661 RIGHT CALF PAIN: Primary | ICD-10-CM

## 2024-09-06 PROCEDURE — 99214 OFFICE O/P EST MOD 30 MIN: CPT | Performed by: NURSE PRACTITIONER

## 2024-09-06 PROCEDURE — 93971 EXTREMITY STUDY: CPT | Performed by: INTERNAL MEDICINE

## 2024-09-06 PROCEDURE — 93971 EXTREMITY STUDY: CPT

## 2024-09-06 RX ORDER — ASCORBATE CALCIUM 500 MG
500 TABLET ORAL DAILY
COMMUNITY

## 2024-09-06 NOTE — PROGRESS NOTES
"Ambulatory Visit  Name: Sophia Sparks      : 1970      MRN: [id number]  Encounter Provider: TANVI Gloria  Encounter Date: 2024   Encounter department: [facility] PRIMARY CARE [address]     Assessment & Plan   1. Right calf pain  -     VAS VENOUS DUPLEX -LOWER LIMB UNILATERAL; Future; Expected date: 2024    Will r/o DVT w/STAT venous doppler RLE today - will determine further plan pending result    Addendum: venous doppler negative for DVT - advise she treat as muscle strain w/gentle stretches, warm compresses and elevation       History of Present Illness       States she woke with cramp in right leg at 4am yesterday morning. Leg cramps normally resolve with repositioning but this one did not ease as usual. Felt a knot in calf when she woke later so massaged leg yesterday. Noted a bruise in back of leg and has a \"pull\" when going up stairs. Denies redness or warmth to leg. Aggravated right knee with stretching yesterday. Took advil yesterday and elevated last night. Did not use heat. No injury to leg known. Was on bus trip last week to [place] (8 hours each way).         Review of Systems   Respiratory: Negative.     Cardiovascular: Negative.    Musculoskeletal:  Positive for myalgias (right leg/calf). Negative for gait problem.       Past Medical History:   Diagnosis Date    Anxiety     Anxiety disorder     Fibroid     Hirsutism     History of IBS     Irritable bowel syndrome 2023    Polycystic ovarian syndrome      Past Surgical History:   Procedure Laterality Date    EGD      MOUTH SURGERY      NO PAST SURGERIES       Family History   Problem Relation Age of Onset    Arthritis Mother     Depression Mother     Hypertension Mother     Thyroid disease Mother     Crohn's disease Father     Diabetes Father     Heart disease Father     Hypertension Father     No Known Problems Maternal Grandmother     No Known Problems Maternal Grandfather     No Known Problems Paternal " Grandmother     No Known Problems Paternal Grandfather     Uterine cancer Maternal Aunt         70's    No Known Problems Maternal Aunt     No Known Problems Maternal Aunt     No Known Problems Maternal Aunt     No Known Problems Maternal Aunt     Lung cancer Maternal Uncle         Unknown age    Breast cancer Cousin         Unknown age    Breast cancer Cousin         Unknown age    Breast cancer Cousin         30's    Colon cancer Neg Hx      Social History     Tobacco Use    Smoking status: Never    Smokeless tobacco: Never   Vaping Use    Vaping status: Never Used   Substance and Sexual Activity    Alcohol use: Yes     Comment: rarely    Drug use: No    Sexual activity: Not Currently     Partners: Male     Birth control/protection: Pill     Current Outpatient Medications on File Prior to Visit   Medication Sig    ALPRAZolam (XANAX) 0.5 mg tablet Take 1 tablet (0.5 mg total) by mouth 3 (three) times a day as needed for anxiety    b complex vitamins tablet Take 1 tablet by mouth daily    Calcium Ascorbate (VITAMIN C) 500 mg tablet Take 500 mg by mouth daily    Calcium Carb-Cholecalciferol (CALCIUM + D3) 600-200 MG-UNIT TABS Take by mouth    calcium polycarbophil (FIBERCON) 625 mg tablet Take 625 mg by mouth daily    lisinopril (ZESTRIL) 10 mg tablet TAKE ONE TABLET BY MOUTH EVERY DAY    Multiple Vitamin (MULTI-VITAMIN DAILY) TABS Take by mouth     Allergies   Allergen Reactions    Amoxicillin      Other reaction(s): GI Upset  Reaction Date: 12Sep2011;     Clarithromycin      Reaction Date: 12Sep2011;     Doxycycline Other (See Comments)    Influenza Vaccines     Naproxen      Other reaction(s): GI Upset     Immunization History   Administered Date(s) Administered    COVID-19 PFIZER VACCINE 0.3 ML IM 04/08/2021, 05/01/2021, 11/26/2021, 04/29/2022    COVID-19 Pfizer Vac BIVALENT Juanpablo-sucrose 12 Yr+ IM 01/07/2023    COVID-19 Pfizer vac (Juanpablo-sucrose, gray cap) 12 yr+ IM 04/29/2022    INFLUENZA 11/21/2020, 11/13/2021  "   Influenza, injectable, quadrivalent, preservative free 0.5 mL 11/21/2020    Tdap 04/28/2014     Objective     /84   Pulse 82   Temp 97.8 °F (36.6 °C)   Ht 5' 5\" (1.651 m)   Wt 82 kg (180 lb 12.8 oz)   BMI 30.09 kg/m²       Physical Exam  Vitals reviewed.   Constitutional:       General: She is not in acute distress.     Appearance: Normal appearance.   HENT:      Head: Normocephalic.   Pulmonary:      Effort: Pulmonary effort is normal. No respiratory distress.   Musculoskeletal:      Right lower leg: Tenderness present. No edema.      Left lower leg: Normal. No edema.        Legs:    Skin:     General: Skin is warm and dry.   Neurological:      General: No focal deficit present.      Mental Status: She is alert and oriented to person, place, and time.      Gait: Gait normal.   Psychiatric:         Mood and Affect: Mood normal.         Behavior: Behavior normal.         Administrative Statements   I have spent a total time of 15 minutes in caring for this patient on the day of the visit/encounter including Risks and benefits of tx options, Instructions for management, Patient and family education, Impressions, Counseling / Coordination of care, Documenting in the medical record, Reviewing / ordering tests, medicine, procedures  , and Obtaining or reviewing history  .  "

## 2024-09-18 PROBLEM — Z00.00 WELL ADULT EXAM: Status: RESOLVED | Noted: 2019-05-22 | Resolved: 2024-09-18

## 2024-11-21 NOTE — PROGRESS NOTES
Name: Sophia Sparks      : 1970      MRN: [id number]  Encounter Provider: Antonia Osborn DO  Encounter Date: 2024   Encounter department: [facility]  :  Assessment & Plan  Encounter for screening mammogram for breast cancer    Orders:    Mammo screening bilateral w 3d and cad; Future    Encounter for annual routine gynecological examination         Fibroids    Orders:    US pelvis complete w transvaginal; Future    Perimenopause         pap is up to date    mammogram reviewed with her including breast density.  RX given and she is aware that she needs to schedule     Discussed self breast exams    Perimenopause-she will continue to keep track of her cycles.  We discussed hot flashes.  She feels that the way they are now, they are tolerable.  If this changes, she will call and we can discuss treatments    Fibroids-her exam is stable.  I did order an ultrasound however because she does have many fibroids and she feels that her cycles are painful at times.    colon cancer screening - colonoscopy in , recall in ten years    discussed preventive care, regular exercise and a healthy diet      History of Present Illness     HPI  Sophia Sparks is a 54 y.o. female who presents for yearly.  She has been skipping menstrual cycles.    Her last cycles was at the end of August.  They have been occurring about every 3 months.  Flow varies.  She does get cramps.  She is getting more hot flashes, manageable for now.  Night sweats during the summer but she is not having them now.       She has multiple fibroids that we have been following.    Normal 3D mammogram last November with scattered fibroglandular densities and average risk  Normal Pap, negative HPV in 2022      Review of Systems   Constitutional: Negative.    Gastrointestinal: Negative.    Genitourinary: Negative.           Objective   There were no vitals taken for this visit.     Physical Exam  Vitals and nursing note  reviewed. Exam conducted with a chaperone present.   Constitutional:       Appearance: She is well-developed.   HENT:      Head: Normocephalic and atraumatic.   Neck:      Thyroid: No thyromegaly.   Cardiovascular:      Rate and Rhythm: Normal rate and regular rhythm.   Pulmonary:      Effort: Pulmonary effort is normal.      Breath sounds: Normal breath sounds.   Chest:   Breasts:     Right: Normal.      Left: Normal.      Comments: Examined seated and supine  Abdominal:      Palpations: Abdomen is soft.   Genitourinary:     General: Normal vulva.      Vagina: Normal.      Cervix: Normal.      Uterus: Normal. Enlarged.       Adnexa: Right adnexa normal and left adnexa normal.      Rectum: Normal.      Comments: 10-12 week size, mobile uterus, irregular  Musculoskeletal:      Cervical back: Neck supple.   Skin:     General: Skin is warm and dry.   Neurological:      Mental Status: She is alert.   Psychiatric:         Mood and Affect: Mood normal.

## 2024-11-22 ENCOUNTER — ANNUAL EXAM (OUTPATIENT)
Dept: GYNECOLOGY | Facility: CLINIC | Age: 54
End: 2024-11-22
Payer: COMMERCIAL

## 2024-11-22 VITALS
BODY MASS INDEX: 30.06 KG/M2 | DIASTOLIC BLOOD PRESSURE: 84 MMHG | WEIGHT: 180.4 LBS | HEIGHT: 65 IN | SYSTOLIC BLOOD PRESSURE: 126 MMHG

## 2024-11-22 DIAGNOSIS — D21.9 FIBROIDS: ICD-10-CM

## 2024-11-22 DIAGNOSIS — Z01.419 ENCOUNTER FOR ANNUAL ROUTINE GYNECOLOGICAL EXAMINATION: ICD-10-CM

## 2024-11-22 DIAGNOSIS — N95.1 PERIMENOPAUSE: ICD-10-CM

## 2024-11-22 DIAGNOSIS — Z12.31 ENCOUNTER FOR SCREENING MAMMOGRAM FOR BREAST CANCER: Primary | ICD-10-CM

## 2024-11-22 PROCEDURE — S0612 ANNUAL GYNECOLOGICAL EXAMINA: HCPCS | Performed by: OBSTETRICS & GYNECOLOGY

## 2024-12-02 ENCOUNTER — TELEPHONE (OUTPATIENT)
Age: 54
End: 2024-12-02

## 2024-12-02 DIAGNOSIS — R68.89 OTHER GENERAL SYMPTOMS AND SIGNS: Primary | ICD-10-CM

## 2024-12-02 NOTE — TELEPHONE ENCOUNTER
Patient is requesting an insurance referral for the following specialty:      Test Name / Order Name: Ingrown Toe Nail    DX Code: Did not know    Date Of Service: 12/5    Location/Facility Name/Address/Phone #: [facility] [phone]    Location / Facility NPI: [id number]    Best Phone # To Reach The Patient:  [phone] .

## 2024-12-26 ENCOUNTER — OFFICE VISIT (OUTPATIENT)
Dept: FAMILY MEDICINE CLINIC | Facility: HOSPITAL | Age: 54
End: 2024-12-26
Payer: COMMERCIAL

## 2024-12-26 VITALS
OXYGEN SATURATION: 99 % | DIASTOLIC BLOOD PRESSURE: 85 MMHG | SYSTOLIC BLOOD PRESSURE: 149 MMHG | HEIGHT: 65 IN | WEIGHT: 184.4 LBS | HEART RATE: 85 BPM | BODY MASS INDEX: 30.72 KG/M2 | TEMPERATURE: 97.6 F

## 2024-12-26 DIAGNOSIS — R07.9 CHEST PAIN, UNSPECIFIED TYPE: ICD-10-CM

## 2024-12-26 DIAGNOSIS — R00.2 PALPITATIONS: ICD-10-CM

## 2024-12-26 DIAGNOSIS — F41.9 ANXIETY: Primary | ICD-10-CM

## 2024-12-26 PROCEDURE — 99214 OFFICE O/P EST MOD 30 MIN: CPT | Performed by: INTERNAL MEDICINE

## 2024-12-26 PROCEDURE — 93000 ELECTROCARDIOGRAM COMPLETE: CPT | Performed by: INTERNAL MEDICINE

## 2024-12-26 NOTE — ASSESSMENT & PLAN NOTE
Reassured that symptoms noted below are likely d/t anxiety, ECG and breast exam done, reassurance given, daily anxiolytics reviewed (SNRI - Effexor and SSRI - Zoloft and Lexapro), may do her own research as she wishes and call if wishes to try a medication, con't prn lorazepam, again discussed therapy but her copay was $100 a session so she stopped it, call with new/worse mood

## 2024-12-29 ENCOUNTER — HOSPITAL ENCOUNTER (OUTPATIENT)
Dept: ULTRASOUND IMAGING | Facility: HOSPITAL | Age: 54
Discharge: HOME | End: 2024-12-29
Attending: OBSTETRICS & GYNECOLOGY
Payer: COMMERCIAL

## 2024-12-29 DIAGNOSIS — D21.9 FIBROIDS: ICD-10-CM

## 2024-12-29 PROCEDURE — 76856 US EXAM PELVIC COMPLETE: CPT

## 2024-12-29 PROCEDURE — 76830 TRANSVAGINAL US NON-OB: CPT

## 2025-01-06 ENCOUNTER — RESULTS FOLLOW-UP (OUTPATIENT)
Dept: GYNECOLOGY | Facility: CLINIC | Age: 55
End: 2025-01-06

## 2025-01-06 DIAGNOSIS — D21.9 FIBROIDS: Primary | ICD-10-CM

## 2025-01-07 DIAGNOSIS — D21.9 FIBROIDS: Primary | ICD-10-CM

## 2025-01-07 DIAGNOSIS — R93.89 ENDOMETRIAL THICKENING ON ULTRASOUND: ICD-10-CM

## 2025-01-18 ENCOUNTER — HOSPITAL ENCOUNTER (OUTPATIENT)
Dept: MAMMOGRAPHY | Facility: CLINIC | Age: 55
Discharge: HOME | End: 2025-01-18
Payer: COMMERCIAL

## 2025-01-18 VITALS — WEIGHT: 184 LBS | HEIGHT: 65 IN | BODY MASS INDEX: 30.66 KG/M2

## 2025-01-18 DIAGNOSIS — Z12.31 ENCOUNTER FOR SCREENING MAMMOGRAM FOR BREAST CANCER: ICD-10-CM

## 2025-01-18 PROCEDURE — 77063 BREAST TOMOSYNTHESIS BI: CPT

## 2025-01-18 PROCEDURE — 77067 SCR MAMMO BI INCL CAD: CPT

## 2025-01-31 ENCOUNTER — OFFICE VISIT (OUTPATIENT)
Dept: FAMILY MEDICINE CLINIC | Facility: HOSPITAL | Age: 55
End: 2025-01-31
Payer: COMMERCIAL

## 2025-01-31 VITALS
TEMPERATURE: 97.6 F | WEIGHT: 178.4 LBS | HEART RATE: 80 BPM | BODY MASS INDEX: 29.72 KG/M2 | DIASTOLIC BLOOD PRESSURE: 85 MMHG | HEIGHT: 65 IN | OXYGEN SATURATION: 98 % | SYSTOLIC BLOOD PRESSURE: 128 MMHG

## 2025-01-31 DIAGNOSIS — K21.00 GASTROESOPHAGEAL REFLUX DISEASE WITH ESOPHAGITIS WITHOUT HEMORRHAGE: Primary | ICD-10-CM

## 2025-01-31 PROCEDURE — 99214 OFFICE O/P EST MOD 30 MIN: CPT | Performed by: INTERNAL MEDICINE

## 2025-01-31 RX ORDER — FAMOTIDINE 40 MG/1
40 TABLET, FILM COATED ORAL
Qty: 30 TABLET | Refills: 1 | Status: SHIPPED | OUTPATIENT
Start: 2025-01-31

## 2025-01-31 NOTE — PROGRESS NOTES
Name: Sophia Sparks      : 1970      MRN: [id number]  Encounter Provider: Taylor Velazco DO  Encounter Date: 2025   Encounter department: [facility] [address]   :  Assessment & Plan  Gastroesophageal reflux disease with esophagitis without hemorrhage  Symptoms very c/w reflux esophagitis and she has had some known triggers with stress and notes symptoms worse with pizza, will start Pepcid 40 mg 1 tab PO qhs (insurance did not cover 20 mg bid), dietary/lifestyle triggers reviewed and she was urged to avoid triggers if she was able to id them, discussed if symptoms not better may need to add PPI in am and/or see GI for further eval/EGD discussion, red flag GI symptoms reviewed - call if they occur  Orders:    famotidine (PEPCID) 40 MG tablet; Take 1 tablet (40 mg total) by mouth daily at bedtime           History of Present Illness   HPI pt here for an acute visit    Pt noting 5 days of heart burn symptoms. She describes burning from epigastric region up to the chest and sometimes in the back.  She notes no regurgitation of food but has no abd pain. She has had some pressure btw shoulders.  She has had no N/V/blood in the stools/black tarry stools/swallow issues/unintentional wgt loss.  She has had some loss of appetite.  She has had some hoarseness.  She has tried OTC TUMS w/some benefit.  She notes her fried was recently dx with esophageal reflux.          Review of Systems   Constitutional:  Positive for appetite change. Negative for chills, fever and unexpected weight change.   HENT:  Positive for voice change. Negative for trouble swallowing.    Eyes:  Negative for pain and visual disturbance.   Respiratory:  Negative for cough and wheezing.    Cardiovascular:  Negative for chest pain and palpitations.   Gastrointestinal:  Positive for abdominal pain. Negative for blood in stool, constipation, diarrhea, nausea and vomiting.   Genitourinary:  Negative for difficulty  "urinating and dysuria.   Musculoskeletal:  Positive for back pain.   Skin:  Negative for rash and wound.   Neurological:  Negative for dizziness and headaches.   Psychiatric/Behavioral:  The patient is nervous/anxious.        Objective   /85 (BP Location: Left arm, Patient Position: Sitting, Cuff Size: Large)   Pulse 80   Temp 97.6 °F (36.4 °C) (Tympanic)   Ht 5' 5\" (1.651 m)   Wt 80.9 kg (178 lb 6.4 oz)   LMP  (LMP Unknown)   SpO2 98%   BMI 29.69 kg/m²      Physical Exam  Vitals and nursing note reviewed.   Constitutional:       General: She is not in acute distress.     Appearance: She is well-developed. She is not ill-appearing.   HENT:      Head: Normocephalic and atraumatic.      Right Ear: External ear normal.      Left Ear: External ear normal.   Eyes:      General:         Right eye: No discharge.         Left eye: No discharge.      Conjunctiva/sclera: Conjunctivae normal.   Neck:      Thyroid: No thyromegaly.      Trachea: No tracheal deviation.   Cardiovascular:      Rate and Rhythm: Normal rate and regular rhythm.      Heart sounds: Normal heart sounds. No murmur heard.  Pulmonary:      Effort: Pulmonary effort is normal. No respiratory distress.      Breath sounds: Normal breath sounds. No wheezing, rhonchi or rales.   Abdominal:      General: There is no distension.      Palpations: Abdomen is soft.      Tenderness: There is no abdominal tenderness. There is no guarding or rebound.   Musculoskeletal:         General: No deformity or signs of injury.      Cervical back: Neck supple.   Skin:     General: Skin is warm and dry.      Coloration: Skin is not pale.      Findings: No rash.   Neurological:      General: No focal deficit present.      Mental Status: She is alert.      Motor: No abnormal muscle tone.      Gait: Gait normal.   Psychiatric:         Behavior: Behavior normal.         Thought Content: Thought content normal.         Judgment: Judgment normal.      Comments: Mildly " anxious

## 2025-02-01 ENCOUNTER — NURSE TRIAGE (OUTPATIENT)
Dept: OTHER | Facility: OTHER | Age: 55
End: 2025-02-01

## 2025-02-01 NOTE — TELEPHONE ENCOUNTER
"Regarding: Medication Questiona, Leg Cramp Question  ----- Message from Patsy MACIAS sent at 2/1/2025 10:40 AM EST -----  \" I have a Medication Question for Famotidine 40 mg Tablet, I am supposed to take at Bed Time, Can I take it earlier in the night like 7 or 8 Pm. I also want to know if I can take Advil with it. I also want to know about side effects from the Famotidine. I also need advise for a leg cramp early this morning , what can I do about that.\"    "

## 2025-02-01 NOTE — TELEPHONE ENCOUNTER
"Reason for Disposition   Caller has medicine question, adult has minor symptoms, caller declines triage, AND triager answers question    Answer Assessment - Initial Assessment Questions  1. NAME of MEDICINE: \"What medicine(s) are you calling about?\"        Famotidine    2. QUESTION: \"What is your question?\" (e.g., double dose of medicine, side effect)        Can it be taken a few hours before bed due to taking   Can I take advil?    Concern for \"feeling weird\" last night, described as similar to hot flash, lasting for \"awhile\" but resolved with actively trying to calm down.  Pt attributing to anxiety.    Leg cramp in shin this morning.    Protocols used: Medication Question Call-Adult-      Reassure patient famotidine is a safe medication and recommended to try again this evening.    OK to take IBU.    Heat and stretch for leg cramps.    Patient verbalized understanding.  "

## 2025-02-04 ENCOUNTER — TELEPHONE (OUTPATIENT)
Age: 55
End: 2025-02-04

## 2025-02-04 NOTE — TELEPHONE ENCOUNTER
Pt sent request yesterday and I advised she could take Pepcid 10 mg instead of 20 mg. Con't 10 mg of Pepcid daily if able to tolerate.  If unable to tolerate then she has to f/u with PCP (has appt with Saul 2/24/25)  or see GI

## 2025-02-04 NOTE — TELEPHONE ENCOUNTER
Pt called to report that she took 20MG of the famotidine (PEPCID) last night (2/3) around 7:30PM, and she woke around 1:00AM with racing heart rate and flushed/hot flashes. She took the 40MG the previous night and had the same reaction and was advised to take only 20MG. See pt message in MyChart.  Pt stated she was taking 10MG OTC famotidine previously and had no reactions.  Please call pt to advise, #[phone].

## 2025-02-16 LAB
25(OH)D3+25(OH)D2 SERPL-MCNC: 41.2 NG/ML (ref 30–100)
ALBUMIN SERPL-MCNC: 4.4 G/DL (ref 3.8–4.9)
ALP SERPL-CCNC: 69 IU/L (ref 44–121)
ALT SERPL-CCNC: 27 IU/L (ref 0–32)
AST SERPL-CCNC: 20 IU/L (ref 0–40)
BASOPHILS # BLD AUTO: 0 X10E3/UL (ref 0–0.2)
BASOPHILS NFR BLD AUTO: 1 %
BILIRUB SERPL-MCNC: 0.3 MG/DL (ref 0–1.2)
BUN SERPL-MCNC: 13 MG/DL (ref 6–24)
BUN/CREAT SERPL: 20 (ref 9–23)
CALCIUM SERPL-MCNC: 9.5 MG/DL (ref 8.7–10.2)
CHLORIDE SERPL-SCNC: 101 MMOL/L (ref 96–106)
CHOLEST SERPL-MCNC: 185 MG/DL (ref 100–199)
CO2 SERPL-SCNC: 22 MMOL/L (ref 20–29)
CREAT SERPL-MCNC: 0.64 MG/DL (ref 0.57–1)
EGFR: 105 ML/MIN/1.73
EOSINOPHIL # BLD AUTO: 0.1 X10E3/UL (ref 0–0.4)
EOSINOPHIL NFR BLD AUTO: 1 %
ERYTHROCYTE [DISTWIDTH] IN BLOOD BY AUTOMATED COUNT: 11.6 % (ref 11.7–15.4)
EST. AVERAGE GLUCOSE BLD GHB EST-MCNC: 114 MG/DL
GLOBULIN SER-MCNC: 2.7 G/DL (ref 1.5–4.5)
GLUCOSE SERPL-MCNC: 90 MG/DL (ref 70–99)
HBA1C MFR BLD: 5.6 % (ref 4.8–5.6)
HCT VFR BLD AUTO: 42.6 % (ref 34–46.6)
HDLC SERPL-MCNC: 48 MG/DL
HGB BLD-MCNC: 14.3 G/DL (ref 11.1–15.9)
IMM GRANULOCYTES # BLD: 0 X10E3/UL (ref 0–0.1)
IMM GRANULOCYTES NFR BLD: 0 %
LDLC SERPL CALC-MCNC: 113 MG/DL (ref 0–99)
LDLC/HDLC SERPL: 2.4 RATIO (ref 0–3.2)
LYMPHOCYTES # BLD AUTO: 2.3 X10E3/UL (ref 0.7–3.1)
LYMPHOCYTES NFR BLD AUTO: 42 %
MCH RBC QN AUTO: 29.9 PG (ref 26.6–33)
MCHC RBC AUTO-ENTMCNC: 33.6 G/DL (ref 31.5–35.7)
MCV RBC AUTO: 89 FL (ref 79–97)
MONOCYTES # BLD AUTO: 0.3 X10E3/UL (ref 0.1–0.9)
MONOCYTES NFR BLD AUTO: 6 %
NEUTROPHILS # BLD AUTO: 2.7 X10E3/UL (ref 1.4–7)
NEUTROPHILS NFR BLD AUTO: 50 %
PLATELET # BLD AUTO: 373 X10E3/UL (ref 150–450)
POTASSIUM SERPL-SCNC: 4.5 MMOL/L (ref 3.5–5.2)
PROT SERPL-MCNC: 7.1 G/DL (ref 6–8.5)
RBC # BLD AUTO: 4.78 X10E6/UL (ref 3.77–5.28)
SL AMB VLDL CHOLESTEROL CALC: 24 MG/DL (ref 5–40)
SODIUM SERPL-SCNC: 139 MMOL/L (ref 134–144)
TRIGL SERPL-MCNC: 137 MG/DL (ref 0–149)
TSH SERPL DL<=0.005 MIU/L-ACNC: 2.3 UIU/ML (ref 0.45–4.5)
WBC # BLD AUTO: 5.4 X10E3/UL (ref 3.4–10.8)

## 2025-02-17 ENCOUNTER — RESULTS FOLLOW-UP (OUTPATIENT)
Dept: FAMILY MEDICINE CLINIC | Facility: HOSPITAL | Age: 55
End: 2025-02-17

## 2025-02-24 ENCOUNTER — TELEPHONE (OUTPATIENT)
Dept: FAMILY MEDICINE CLINIC | Facility: HOSPITAL | Age: 55
End: 2025-02-24

## 2025-02-24 ENCOUNTER — OFFICE VISIT (OUTPATIENT)
Dept: FAMILY MEDICINE CLINIC | Facility: HOSPITAL | Age: 55
End: 2025-02-24
Payer: COMMERCIAL

## 2025-02-24 VITALS
HEIGHT: 65 IN | SYSTOLIC BLOOD PRESSURE: 120 MMHG | HEART RATE: 77 BPM | TEMPERATURE: 97.7 F | WEIGHT: 182.4 LBS | BODY MASS INDEX: 30.39 KG/M2 | DIASTOLIC BLOOD PRESSURE: 76 MMHG

## 2025-02-24 DIAGNOSIS — E78.00 PURE HYPERCHOLESTEROLEMIA: ICD-10-CM

## 2025-02-24 DIAGNOSIS — F41.9 ANXIETY: ICD-10-CM

## 2025-02-24 DIAGNOSIS — M77.8 RIGHT ELBOW TENDINITIS: ICD-10-CM

## 2025-02-24 DIAGNOSIS — I10 ESSENTIAL HYPERTENSION: Primary | ICD-10-CM

## 2025-02-24 PROBLEM — L60.0 INGROWN LEFT GREATER TOENAIL: Status: RESOLVED | Noted: 2024-01-29 | Resolved: 2025-02-24

## 2025-02-24 PROBLEM — M67.449 MUCOUS CYST OF FINGER: Status: RESOLVED | Noted: 2018-05-21 | Resolved: 2025-02-24

## 2025-02-24 PROBLEM — M70.51 INFRAPATELLAR BURSITIS OF RIGHT KNEE: Status: RESOLVED | Noted: 2023-05-17 | Resolved: 2025-02-24

## 2025-02-24 PROBLEM — S00.03XA CONTUSION OF SCALP: Status: RESOLVED | Noted: 2024-03-26 | Resolved: 2025-02-24

## 2025-02-24 PROCEDURE — 99214 OFFICE O/P EST MOD 30 MIN: CPT | Performed by: NURSE PRACTITIONER

## 2025-02-24 RX ORDER — ALPRAZOLAM 0.5 MG
0.5 TABLET ORAL 3 TIMES DAILY PRN
Qty: 30 TABLET | Refills: 0 | Status: SHIPPED | OUTPATIENT
Start: 2025-02-24

## 2025-02-24 NOTE — TELEPHONE ENCOUNTER
Left message for call back.    If still available - Can patient come in at 7pm, 6:45p arrival time?

## 2025-02-25 NOTE — ASSESSMENT & PLAN NOTE
Continue OTC advil prn and/or topical voltaren gel  Advise ice compresses, continue elbow brace and stretches as kasey  Offered PT eval but she declines given high co-pay

## 2025-02-25 NOTE — ASSESSMENT & PLAN NOTE
ASCVD risk of 2.6% managing w/lifestyle  Continue lifestyle management and plan to recheck labs next in 1 year

## 2025-02-25 NOTE — PROGRESS NOTES
Name: Sophia Sparks      : 1970      MRN: [id number]  Encounter Provider: TANVI Gloria  Encounter Date: 2025   Encounter department: [facility] CARE [address]   :  Assessment & Plan  Essential hypertension  BP well controlled per in and outside of office BP readings  Continue same lisinopril dose as rx'd (1 tab daily alternating with 1/2 tab daily)  Return in 6 months       Pure hypercholesterolemia  ASCVD risk of 2.6% managing w/lifestyle  Continue lifestyle management and plan to recheck labs next in 1 year       Right elbow tendinitis  Continue OTC advil prn and/or topical voltaren gel  Advise ice compresses, continue elbow brace and stretches as kasey  Offered PT eval but she declines given high co-pay       Anxiety  Episodic need for alprazolam prn - PDMP reviewed and refill issued as she requests       Update annual PE at next appt in 6 months  Continue annual gyn f/u - pap UTD  Colonoscopy UTD      Depression Screening and Follow-up Plan: Patient was screened for depression during today's encounter. They screened negative with a PHQ-2 score of 0.        History of Present Illness       States she is feeling better since last appt. Acid reflux has gotten better and she is now taking pepcid just as needed. Trying to limiting eating the wrong things,  too late and close to bedtime.   She checks BP at [facility] at [facility]. Had weaned lisinopril down to 1/2 tablet QOD, alternating with full tablet as previously rx'd by Dr Gregory. Today she took 1/2 tablet at dinner. She brings along readings of 126-149/76-85. Highest reading was a time of higher anxiety.   Has return of right elbow pain as previously. Using brace at night and takes advil. She is left handed but uses computer mouse all day for work.         Review of Systems   Constitutional: Negative.    Respiratory: Negative.     Cardiovascular: Negative.    Musculoskeletal:  Positive for arthralgias (right elbow). Negative for  "joint swelling.   Psychiatric/Behavioral:  Negative for dysphoric mood. The patient is nervous/anxious.          Objective   /76   Pulse 77   Temp 97.7 °F (36.5 °C)   Ht 5' 5\" (1.651 m)   Wt 82.7 kg (182 lb 6.4 oz)   LMP  (LMP Unknown)   BMI 30.35 kg/m²          Physical Exam  Vitals reviewed.   Constitutional:       General: She is not in acute distress.     Appearance: Normal appearance.   HENT:      Head: Normocephalic.   Eyes:      General: No scleral icterus.  Neck:      Thyroid: No thyromegaly.      Vascular: No carotid bruit.   Cardiovascular:      Rate and Rhythm: Normal rate and regular rhythm.   Pulmonary:      Effort: Pulmonary effort is normal. No respiratory distress.      Breath sounds: Normal breath sounds.   Musculoskeletal:      Cervical back: Normal range of motion.      Right lower leg: No edema.      Left lower leg: No edema.   Lymphadenopathy:      Cervical: No cervical adenopathy.   Skin:     General: Skin is warm and dry.   Neurological:      General: No focal deficit present.      Mental Status: She is alert and oriented to person, place, and time.   Psychiatric:         Mood and Affect: Mood normal.         Behavior: Behavior normal.         Thought Content: Thought content normal.         Judgment: Judgment normal.         Administrative Statements   I have spent a total time of 20 minutes in caring for this patient on the day of the visit/encounter including Diagnostic results, Instructions for management, Patient and family education, Impressions, Counseling / Coordination of care, Documenting in the medical record, Reviewing/placing orders in the medical record (including tests, medications, and/or procedures), and Obtaining or reviewing history  .  "

## 2025-02-25 NOTE — ASSESSMENT & PLAN NOTE
BP well controlled per in and outside of office BP readings  Continue same lisinopril dose as rx'd (1 tab daily alternating with 1/2 tab daily)  Return in 6 months

## 2025-03-20 ENCOUNTER — OFFICE VISIT (OUTPATIENT)
Dept: FAMILY MEDICINE CLINIC | Facility: HOSPITAL | Age: 55
End: 2025-03-20
Payer: COMMERCIAL

## 2025-03-20 VITALS
TEMPERATURE: 98.3 F | OXYGEN SATURATION: 96 % | HEART RATE: 80 BPM | SYSTOLIC BLOOD PRESSURE: 124 MMHG | DIASTOLIC BLOOD PRESSURE: 74 MMHG | BODY MASS INDEX: 30.45 KG/M2 | WEIGHT: 183 LBS

## 2025-03-20 DIAGNOSIS — S16.1XXA STRAIN OF NECK MUSCLE, INITIAL ENCOUNTER: ICD-10-CM

## 2025-03-20 DIAGNOSIS — I10 ESSENTIAL HYPERTENSION: Primary | ICD-10-CM

## 2025-03-20 PROCEDURE — 99213 OFFICE O/P EST LOW 20 MIN: CPT | Performed by: NURSE PRACTITIONER

## 2025-03-20 RX ORDER — CYCLOBENZAPRINE HCL 5 MG
5 TABLET ORAL 3 TIMES DAILY PRN
Qty: 21 TABLET | Refills: 0 | Status: SHIPPED | OUTPATIENT
Start: 2025-03-20

## 2025-03-20 NOTE — PATIENT INSTRUCTIONS
Use of lidocaine or salonpas.  Flexeril as directed.   Use of heat up to 15-20 minutes daily up to 4 times daily.   Massage, massage gun    MODERATE

## 2025-03-20 NOTE — ASSESSMENT & PLAN NOTE
History of hypertension controlled today 124/74.  Reports she is alternating lisinopril doses with goal of discontinuation.  Currently taking lisinopril 10 mg every other day alternating with 5 mg.  Denies chest pressure pain palpitations shortness of breath dizziness nor lightheadedness.  Electrolyte and kidney function WNL on 2/15/2025.  Continue current management.

## 2025-03-20 NOTE — PROGRESS NOTES
"[facility]   Christi TINAJERO    Assessment/Plan:   1. Essential hypertension  Assessment & Plan:  History of hypertension controlled today 124/74.  Reports she is alternating lisinopril doses with goal of discontinuation.  Currently taking lisinopril 10 mg every other day alternating with 5 mg.  Denies chest pressure pain palpitations shortness of breath dizziness nor lightheadedness.  Electrolyte and kidney function WNL on 2/15/2025.  Continue current management.  2. Strain of neck muscle, initial encounter  Assessment & Plan:  Went to bed the Monday night and felt \"pop\" sensation in left side of neck  when turning in bed.  Works as a  at a desk for long hours.  Notes aching stiffness discomfort in left trapezius muscle.  AROM with some tightness when turn head to right as well as right lateral rotation.  Left trapezius tight with tenderness upon palpating.  No fever/chills/change in appetite.  No radicular signs.  No chest pain/pressure/dyspnea.  Using ibuprofen along with ice but doesn't feel ice is helpful.  Can't afford PT due to co-pay.   -discussed use of massage, massage gun and stretching exercises.  Use of ibuprofen with as needed flexeril for the next week.  Salon pas/Lidoderm patches.    -if no improvement asked her to reconsider PT.       Orders:  -     cyclobenzaprine (FLEXERIL) 5 mg tablet; Take 1 tablet (5 mg total) by mouth 3 (three) times a day as needed for muscle spasms      No follow-ups on file.  Patient may call or return to office with any questions or concerns.     ______________________________________________________________________  Subjective:     Patient ID: Sophia Sparks is a 54 y.o. female.  Sophia Sparks  Chief Complaint   Patient presents with    Neck Pain     More achy than painful. Felt a pop in left cervical area x2 nights ago and now achy     HPI         The following portions of the patient's history were reviewed and updated as appropriate: " allergies, current medications, past family history, past medical history, past social history, past surgical history, and problem list.    Review of Systems   Constitutional: Negative.  Negative for activity change, appetite change, chills, fatigue and fever.   HENT: Negative.  Negative for congestion, ear pain, postnasal drip and sinus pain.    Eyes: Negative.    Respiratory: Negative.  Negative for cough and shortness of breath.    Cardiovascular: Negative.  Negative for chest pain and leg swelling.   Gastrointestinal: Negative.  Negative for constipation and diarrhea.   Endocrine: Negative.    Genitourinary: Negative.  Negative for dysuria.   Musculoskeletal:  Positive for myalgias.   Skin: Negative.    Allergic/Immunologic: Negative.  Negative for immunocompromised state.   Neurological: Negative.  Negative for dizziness and light-headedness.   Hematological: Negative.    Psychiatric/Behavioral: Negative.           Objective:      Vitals:    03/20/25 0738   BP: 124/74   Pulse: 80   Temp: 98.3 °F (36.8 °C)   SpO2: 96%      Physical Exam  Vitals and nursing note reviewed.   Constitutional:       Appearance: Normal appearance. She is obese.   HENT:      Head: Normocephalic and atraumatic.      Right Ear: Tympanic membrane, ear canal and external ear normal.      Left Ear: Tympanic membrane, ear canal and external ear normal.      Nose: Nose normal.      Mouth/Throat:      Mouth: Mucous membranes are moist.      Pharynx: Oropharynx is clear.   Eyes:      Extraocular Movements: Extraocular movements intact.      Conjunctiva/sclera: Conjunctivae normal.      Pupils: Pupils are equal, round, and reactive to light.   Cardiovascular:      Rate and Rhythm: Normal rate and regular rhythm.      Pulses: Normal pulses.      Heart sounds: Normal heart sounds.   Pulmonary:      Effort: Pulmonary effort is normal.      Breath sounds: Normal breath sounds.   Abdominal:      General: Bowel sounds are normal.      Palpations:  "Abdomen is soft.   Musculoskeletal:         General: Normal range of motion.      Cervical back: Normal range of motion and neck supple. Tenderness present.        Back:       Comments: Left trap tight/tenderness with palpation.  AROM    Skin:     General: Skin is warm and dry.   Neurological:      General: No focal deficit present.      Mental Status: She is alert and oriented to person, place, and time.   Psychiatric:         Mood and Affect: Mood is anxious.         Behavior: Behavior normal.         Thought Content: Thought content normal.         Judgment: Judgment normal.           Portions of the record may have been created with voice recognition software. Occasional wrong word or \"sound alike\" substitutions may have occurred due to the inherent limitations of voice recognition software. Please review the chart carefully and recognize, using context, where substitutions/typographical errors may have occurred.       "

## 2025-03-20 NOTE — ASSESSMENT & PLAN NOTE
"Went to bed the Monday night and felt \"pop\" sensation in left side of neck  when turning in bed.  Works as a  at a desk for long hours.  Notes aching stiffness discomfort in left trapezius muscle.  AROM with some tightness when turn head to right as well as right lateral rotation.  Left trapezius tight with tenderness upon palpating.  No fever/chills/change in appetite.  No radicular signs.  No chest pain/pressure/dyspnea.  Using ibuprofen along with ice but doesn't feel ice is helpful.  Can't afford PT due to co-pay.   -discussed use of massage, massage gun and stretching exercises.  Use of ibuprofen with as needed flexeril for the next week.  Salon pas/Lidoderm patches.    -if no improvement asked her to reconsider PT.       "

## 2025-04-09 ENCOUNTER — OFFICE VISIT (OUTPATIENT)
Dept: FAMILY MEDICINE CLINIC | Facility: HOSPITAL | Age: 55
End: 2025-04-09
Payer: COMMERCIAL

## 2025-04-09 VITALS
TEMPERATURE: 98.5 F | BODY MASS INDEX: 29.95 KG/M2 | HEART RATE: 82 BPM | DIASTOLIC BLOOD PRESSURE: 80 MMHG | WEIGHT: 180 LBS | OXYGEN SATURATION: 99 % | SYSTOLIC BLOOD PRESSURE: 130 MMHG

## 2025-04-09 DIAGNOSIS — H69.91 EUSTACHIAN TUBE DYSFUNCTION, RIGHT: Primary | ICD-10-CM

## 2025-04-09 PROCEDURE — 99213 OFFICE O/P EST LOW 20 MIN: CPT | Performed by: NURSE PRACTITIONER

## 2025-04-09 NOTE — PROGRESS NOTES
"[facility]   Christi TINAJERO    Assessment/Plan:   1. Eustachian tube dysfunction, right  Assessment & Plan:  Hx seasonal allergies and chronic tinnitus since sinus infection a few years ago.  Seen by ENT in the past with insignificant findings.  Reports getting a massage last week which helped with neck pain however exacerbated her tinnitus with some ear fullness.  Twinge of right ear pain and mild pruritus x 1 occurrence.  Does have seasonal allergies and notes fluctuation in her tinnitus with barometric pressure changes-not currently taking her Nasonex nor Allegra which manage her symptoms in the past.  Was moving things at her house and cleaning which could have also exacerbated symptoms.  Mild rhinorrhea last night.  Denies fever chills feeling otherwise well.  No nausea vomiting or balance issues.  Advil cold sinus last night with improvement.  - NAD, afebrile, VSS.  Bilateral ear canals with mild effusion R>L.  Right ear canal with mild irritation-admits to Q-tip use a day or 2 ago.  Small amount of earwax in right canal.  Negative tragus pull/mastoid tenderness.  Mild rhinorrhea.  Negative nystagmus.  Able to change positions and ambulate with steady gait independent of AD.  -discussed likely due to seasonal allergies.  Will restart nasonex and if no improvement consider restarting allegra.  Push fluids and optimize nutrition.        Restart nasonex as discussed in office.   Consider restarting Allegra if nasonex not effective.    Push fluids  No follow-ups on file.  Patient may call or return to office with any questions or concerns.     ______________________________________________________________________  Subjective:     Patient ID: Sophia Sparks is a 54 y.o. female.  Sophia Sparks  Chief Complaint   Patient presents with    Tinnitus     \"Humming\" and fullness, right side worse     HPI         The following portions of the patient's history were reviewed and updated as appropriate: " allergies, current medications, past family history, past medical history, past social history, past surgical history, and problem list.    Review of Systems   Constitutional: Negative.  Negative for activity change, appetite change, chills, fatigue, fever and unexpected weight change.   HENT:  Positive for hearing loss, rhinorrhea and tinnitus. Negative for congestion, ear discharge, ear pain, postnasal drip, sinus pressure, sinus pain and sneezing.         Ear fullness with a twinge of pain and pruritus x1 occurance.    Eyes: Negative.  Negative for visual disturbance.   Respiratory: Negative.  Negative for cough, chest tightness, shortness of breath and wheezing.    Cardiovascular: Negative.  Negative for chest pain, palpitations and leg swelling.   Gastrointestinal: Negative.  Negative for constipation and diarrhea.   Endocrine: Negative.    Genitourinary: Negative.  Negative for dysuria.   Musculoskeletal: Negative.  Negative for gait problem.        Neck pain much improvement.    Skin: Negative.    Allergic/Immunologic: Negative.  Negative for immunocompromised state.   Neurological: Negative.  Negative for dizziness, weakness and light-headedness.   Hematological: Negative.    Psychiatric/Behavioral: Negative.  Negative for sleep disturbance.          Objective:      Vitals:    04/09/25 0758   BP: 130/80   Pulse: 82   Temp: 98.5 °F (36.9 °C)   SpO2: 99%      Physical Exam  Vitals and nursing note reviewed.   Constitutional:       Appearance: Normal appearance.   HENT:      Head: Normocephalic and atraumatic.      Right Ear: Ear canal and external ear normal. A middle ear effusion is present.      Left Ear: Ear canal and external ear normal. A middle ear effusion is present.      Ears:      Comments: Small amount of cerumen in right ear canal  Canal with minor irritation -admits to Qtip use a day or 2 ago.     Nose: Rhinorrhea present. Rhinorrhea is clear.      Mouth/Throat:      Mouth: Mucous membranes are  "moist.      Pharynx: Oropharynx is clear.   Eyes:      Extraocular Movements: Extraocular movements intact.      Conjunctiva/sclera: Conjunctivae normal.      Pupils: Pupils are equal, round, and reactive to light.   Cardiovascular:      Rate and Rhythm: Normal rate and regular rhythm.      Pulses: Normal pulses.      Heart sounds: Normal heart sounds.   Pulmonary:      Effort: Pulmonary effort is normal.      Breath sounds: Normal breath sounds.   Abdominal:      General: Bowel sounds are normal.      Palpations: Abdomen is soft.   Musculoskeletal:         General: Normal range of motion.      Cervical back: Normal range of motion and neck supple.   Skin:     General: Skin is warm and dry.   Neurological:      General: No focal deficit present.      Mental Status: She is alert and oriented to person, place, and time.   Psychiatric:         Mood and Affect: Mood normal.         Behavior: Behavior normal.         Thought Content: Thought content normal.         Judgment: Judgment normal.           Portions of the record may have been created with voice recognition software. Occasional wrong word or \"sound alike\" substitutions may have occurred due to the inherent limitations of voice recognition software. Please review the chart carefully and recognize, using context, where substitutions/typographical errors may have occurred.       "

## 2025-04-09 NOTE — PATIENT INSTRUCTIONS
Restart nasonex as discussed in office.   Consider restarting Allegra if nasonex not effective.    Push fluids

## 2025-04-09 NOTE — ASSESSMENT & PLAN NOTE
Hx seasonal allergies and chronic tinnitus since sinus infection a few years ago.  Seen by ENT in the past with insignificant findings.  Reports getting a massage last week which helped with neck pain however exacerbated her tinnitus with some ear fullness.  Twinge of right ear pain and mild pruritus x 1 occurrence.  Does have seasonal allergies and notes fluctuation in her tinnitus with barometric pressure changes-not currently taking her Nasonex nor Allegra which manage her symptoms in the past.  Was moving things at her house and cleaning which could have also exacerbated symptoms.  Mild rhinorrhea last night.  Denies fever chills feeling otherwise well.  No nausea vomiting or balance issues.  Advil cold sinus last night with improvement.  - NAD, afebrile, VSS.  Bilateral ear canals with mild effusion R>L.  Right ear canal with mild irritation-admits to Q-tip use a day or 2 ago.  Small amount of earwax in right canal.  Negative tragus pull/mastoid tenderness.  Mild rhinorrhea.  Negative nystagmus.  Able to change positions and ambulate with steady gait independent of AD.  -discussed likely due to seasonal allergies.  Will restart nasonex and if no improvement consider restarting allegra.  Push fluids and optimize nutrition.

## 2025-04-10 DIAGNOSIS — I10 ESSENTIAL HYPERTENSION: ICD-10-CM

## 2025-04-10 RX ORDER — LISINOPRIL 10 MG/1
10 TABLET ORAL DAILY
Qty: 30 TABLET | Refills: 5 | Status: SHIPPED | OUTPATIENT
Start: 2025-04-10

## 2025-04-22 ENCOUNTER — TELEPHONE (OUTPATIENT)
Age: 55
End: 2025-04-22

## 2025-04-22 NOTE — TELEPHONE ENCOUNTER
Patient is requesting an insurance referral for the following specialty:      Test Name / Order Name: follow up appt    DX Code:     Date Of Service: 4/22/25    Location/Facility Name/Address/Phone #:  [facility] [address]  [place]  [phone]     Location / Facility NPI: [id number]    Best Phone # To Reach The Patient:  [phone]

## 2025-07-12 ENCOUNTER — HOSPITAL ENCOUNTER (OUTPATIENT)
Dept: ULTRASOUND IMAGING | Facility: HOSPITAL | Age: 55
Discharge: HOME | End: 2025-07-12
Attending: OBSTETRICS & GYNECOLOGY
Payer: COMMERCIAL

## 2025-07-12 DIAGNOSIS — D21.9 FIBROIDS: ICD-10-CM

## 2025-07-12 PROCEDURE — 76856 US EXAM PELVIC COMPLETE: CPT

## 2025-07-12 PROCEDURE — 76830 TRANSVAGINAL US NON-OB: CPT

## 2025-08-12 ENCOUNTER — OFFICE VISIT (OUTPATIENT)
Dept: FAMILY MEDICINE CLINIC | Facility: HOSPITAL | Age: 55
End: 2025-08-12
Payer: COMMERCIAL

## 2025-08-12 ENCOUNTER — HOSPITAL ENCOUNTER (OUTPATIENT)
Age: 55
Discharge: HOME | End: 2025-08-12
Attending: INTERNAL MEDICINE
Payer: COMMERCIAL

## 2025-08-15 ENCOUNTER — TELEPHONE (OUTPATIENT)
Age: 55
End: 2025-08-15